# Patient Record
Sex: MALE | Race: WHITE | NOT HISPANIC OR LATINO | Employment: OTHER | URBAN - METROPOLITAN AREA
[De-identification: names, ages, dates, MRNs, and addresses within clinical notes are randomized per-mention and may not be internally consistent; named-entity substitution may affect disease eponyms.]

---

## 2017-01-03 ENCOUNTER — GENERIC CONVERSION - ENCOUNTER (OUTPATIENT)
Dept: OTHER | Facility: OTHER | Age: 72
End: 2017-01-03

## 2017-01-04 ENCOUNTER — GENERIC CONVERSION - ENCOUNTER (OUTPATIENT)
Dept: OTHER | Facility: OTHER | Age: 72
End: 2017-01-04

## 2017-01-09 ENCOUNTER — ALLSCRIPTS OFFICE VISIT (OUTPATIENT)
Dept: OTHER | Facility: OTHER | Age: 72
End: 2017-01-09

## 2017-01-09 DIAGNOSIS — M79.671 PAIN OF RIGHT FOOT: ICD-10-CM

## 2017-01-09 DIAGNOSIS — M79.672 PAIN OF LEFT FOOT: ICD-10-CM

## 2017-02-09 ENCOUNTER — HOSPITAL ENCOUNTER (OUTPATIENT)
Dept: NON INVASIVE DIAGNOSTICS | Facility: HOSPITAL | Age: 72
Discharge: HOME | End: 2017-02-09
Payer: MEDICARE

## 2017-02-09 DIAGNOSIS — I73.9 PERIPHERAL VASCULAR DISEASE (HCC): ICD-10-CM

## 2017-02-09 DIAGNOSIS — M79.672 PAIN OF LEFT FOOT: ICD-10-CM

## 2017-02-09 DIAGNOSIS — M79.671 PAIN OF RIGHT FOOT: ICD-10-CM

## 2017-02-09 PROCEDURE — 93923 UPR/LXTR ART STDY 3+ LVLS: CPT

## 2017-02-09 PROCEDURE — 93925 LOWER EXTREMITY STUDY: CPT

## 2017-02-10 ENCOUNTER — GENERIC CONVERSION - ENCOUNTER (OUTPATIENT)
Dept: OTHER | Facility: OTHER | Age: 72
End: 2017-02-10

## 2017-02-10 DIAGNOSIS — I73.9 PERIPHERAL VASCULAR DISEASE (HCC): ICD-10-CM

## 2017-03-01 ENCOUNTER — ALLSCRIPTS OFFICE VISIT (OUTPATIENT)
Dept: OTHER | Facility: OTHER | Age: 72
End: 2017-03-01

## 2017-03-08 ENCOUNTER — ALLSCRIPTS OFFICE VISIT (OUTPATIENT)
Dept: OTHER | Facility: OTHER | Age: 72
End: 2017-03-08

## 2017-03-09 ENCOUNTER — ALLSCRIPTS OFFICE VISIT (OUTPATIENT)
Dept: WOUND CARE | Facility: HOSPITAL | Age: 72
End: 2017-03-09
Attending: PODIATRIST
Payer: MEDICARE

## 2017-03-09 PROCEDURE — 99213 OFFICE O/P EST LOW 20 MIN: CPT | Performed by: PODIATRIST

## 2017-03-13 ENCOUNTER — GENERIC CONVERSION - ENCOUNTER (OUTPATIENT)
Dept: OTHER | Facility: OTHER | Age: 72
End: 2017-03-13

## 2017-03-23 ENCOUNTER — ALLSCRIPTS OFFICE VISIT (OUTPATIENT)
Dept: WOUND CARE | Facility: HOSPITAL | Age: 72
End: 2017-03-23
Attending: PODIATRIST
Payer: MEDICARE

## 2017-03-23 PROCEDURE — 99212 OFFICE O/P EST SF 10 MIN: CPT | Performed by: PODIATRIST

## 2017-04-11 ENCOUNTER — ALLSCRIPTS OFFICE VISIT (OUTPATIENT)
Dept: OTHER | Facility: OTHER | Age: 72
End: 2017-04-11

## 2017-05-10 ENCOUNTER — GENERIC CONVERSION - ENCOUNTER (OUTPATIENT)
Dept: OTHER | Facility: OTHER | Age: 72
End: 2017-05-10

## 2017-06-02 ENCOUNTER — ALLSCRIPTS OFFICE VISIT (OUTPATIENT)
Dept: OTHER | Facility: OTHER | Age: 72
End: 2017-06-02

## 2017-10-17 ENCOUNTER — GENERIC CONVERSION - ENCOUNTER (OUTPATIENT)
Dept: OTHER | Facility: OTHER | Age: 72
End: 2017-10-17

## 2017-11-08 ENCOUNTER — LAB REQUISITION (OUTPATIENT)
Dept: LAB | Facility: HOSPITAL | Age: 72
End: 2017-11-08
Payer: MEDICARE

## 2017-11-08 ENCOUNTER — ALLSCRIPTS OFFICE VISIT (OUTPATIENT)
Dept: OTHER | Facility: OTHER | Age: 72
End: 2017-11-08

## 2017-11-08 DIAGNOSIS — Z12.5 ENCOUNTER FOR SCREENING FOR MALIGNANT NEOPLASM OF PROSTATE: ICD-10-CM

## 2017-11-08 DIAGNOSIS — R63.4 ABNORMAL WEIGHT LOSS: ICD-10-CM

## 2017-11-08 DIAGNOSIS — E78.5 HYPERLIPIDEMIA: ICD-10-CM

## 2017-11-08 DIAGNOSIS — F41.9 ANXIETY DISORDER: ICD-10-CM

## 2017-11-08 DIAGNOSIS — Z13.1 ENCOUNTER FOR SCREENING FOR DIABETES MELLITUS: ICD-10-CM

## 2017-11-08 DIAGNOSIS — Z11.59 ENCOUNTER FOR SCREENING FOR OTHER VIRAL DISEASES (CODE): ICD-10-CM

## 2017-11-08 LAB
ALBUMIN SERPL BCP-MCNC: 3.7 G/DL (ref 3.5–5)
ALP SERPL-CCNC: 80 U/L (ref 46–116)
ALT SERPL W P-5'-P-CCNC: 16 U/L (ref 12–78)
ANION GAP SERPL CALCULATED.3IONS-SCNC: 8 MMOL/L (ref 4–13)
AST SERPL W P-5'-P-CCNC: 23 U/L (ref 5–45)
BASOPHILS # BLD AUTO: 0.05 THOUSANDS/ΜL (ref 0–0.1)
BASOPHILS NFR BLD AUTO: 1 % (ref 0–1)
BILIRUB SERPL-MCNC: 0.34 MG/DL (ref 0.2–1)
BUN SERPL-MCNC: 34 MG/DL (ref 5–25)
CALCIUM SERPL-MCNC: 8.7 MG/DL (ref 8.3–10.1)
CHLORIDE SERPL-SCNC: 109 MMOL/L (ref 100–108)
CHOLEST SERPL-MCNC: 101 MG/DL (ref 50–200)
CO2 SERPL-SCNC: 24 MMOL/L (ref 21–32)
CREAT SERPL-MCNC: 2.03 MG/DL (ref 0.6–1.3)
EOSINOPHIL # BLD AUTO: 0.32 THOUSAND/ΜL (ref 0–0.61)
EOSINOPHIL NFR BLD AUTO: 4 % (ref 0–6)
ERYTHROCYTE [DISTWIDTH] IN BLOOD BY AUTOMATED COUNT: 14.4 % (ref 11.6–15.1)
EST. AVERAGE GLUCOSE BLD GHB EST-MCNC: 123 MG/DL
GFR SERPL CREATININE-BSD FRML MDRD: 32 ML/MIN/1.73SQ M
GLUCOSE SERPL-MCNC: 95 MG/DL (ref 65–140)
HBA1C MFR BLD: 5.9 % (ref 4.2–6.3)
HCT VFR BLD AUTO: 32.8 % (ref 36.5–49.3)
HDLC SERPL-MCNC: 31 MG/DL (ref 40–60)
HGB BLD-MCNC: 10.4 G/DL (ref 12–17)
LDLC SERPL CALC-MCNC: 44 MG/DL (ref 0–100)
LYMPHOCYTES # BLD AUTO: 2.63 THOUSANDS/ΜL (ref 0.6–4.47)
LYMPHOCYTES NFR BLD AUTO: 30 % (ref 14–44)
MCH RBC QN AUTO: 31 PG (ref 26.8–34.3)
MCHC RBC AUTO-ENTMCNC: 31.7 G/DL (ref 31.4–37.4)
MCV RBC AUTO: 98 FL (ref 82–98)
MONOCYTES # BLD AUTO: 0.72 THOUSAND/ΜL (ref 0.17–1.22)
MONOCYTES NFR BLD AUTO: 8 % (ref 4–12)
NEUTROPHILS # BLD AUTO: 5.05 THOUSANDS/ΜL (ref 1.85–7.62)
NEUTS SEG NFR BLD AUTO: 57 % (ref 43–75)
NRBC BLD AUTO-RTO: 0 /100 WBCS
PLATELET # BLD AUTO: 252 THOUSANDS/UL (ref 149–390)
PMV BLD AUTO: 10.4 FL (ref 8.9–12.7)
POTASSIUM SERPL-SCNC: 5.5 MMOL/L (ref 3.5–5.3)
PROT SERPL-MCNC: 7.2 G/DL (ref 6.4–8.2)
PSA SERPL-MCNC: 0.3 NG/ML (ref 0–4)
RBC # BLD AUTO: 3.36 MILLION/UL (ref 3.88–5.62)
SODIUM SERPL-SCNC: 141 MMOL/L (ref 136–145)
TRIGL SERPL-MCNC: 132 MG/DL
TSH SERPL DL<=0.05 MIU/L-ACNC: 1.58 UIU/ML (ref 0.36–3.74)
WBC # BLD AUTO: 8.79 THOUSAND/UL (ref 4.31–10.16)

## 2017-11-08 PROCEDURE — G0103 PSA SCREENING: HCPCS | Performed by: FAMILY MEDICINE

## 2017-11-08 PROCEDURE — 86803 HEPATITIS C AB TEST: CPT | Performed by: FAMILY MEDICINE

## 2017-11-08 PROCEDURE — 84443 ASSAY THYROID STIM HORMONE: CPT | Performed by: FAMILY MEDICINE

## 2017-11-08 PROCEDURE — 83036 HEMOGLOBIN GLYCOSYLATED A1C: CPT | Performed by: FAMILY MEDICINE

## 2017-11-08 PROCEDURE — 80053 COMPREHEN METABOLIC PANEL: CPT | Performed by: FAMILY MEDICINE

## 2017-11-08 PROCEDURE — 80061 LIPID PANEL: CPT | Performed by: FAMILY MEDICINE

## 2017-11-08 PROCEDURE — 85025 COMPLETE CBC W/AUTO DIFF WBC: CPT | Performed by: FAMILY MEDICINE

## 2017-11-09 LAB — HCV AB SER QL: NORMAL

## 2017-11-10 NOTE — PROGRESS NOTES
Assessment    1  Controlled diabetes mellitus type II without complication (743 53) (N84 8)   2  Need for hepatitis C screening test (V73 89) (Z11 59)   3  Chronic pain (338 29) (G89 29)   4  Current every day smoker (305 1) (F17 200)   · 50+ years / 1 pack per day   5  Chronic kidney disease, stage 3 (585 3) (N18 3)   6  Hypertension (401 9) (I10)    Plan  Abnormal weight loss    · (1) CBC/PLT/DIFF; Status: In Progress - Specimen/Data Collected;   Done: 82VSF4235  Anxiety disorder, Encounter for special screening examination for neoplasm of prostate    · (1) TSH; Status: In Progress - Specimen/Data Collected;   Done: 96TZG6214  Arterial embolism, Chronic obstructive pulmonary disease, DM (diabetes mellitus), type2 with peripheral vascular complications, Hyperlipidemia, Hypertension    · Routine Venipuncture - POC; Status:Complete;   Done: 37ORE3087  Encounter for special screening examination for neoplasm of prostate    · (1) PSA (SCREEN) (Dx V76 44 Screen for Prostate Cancer); Status: In Progress -Specimen/Data Collected;   Done: 80QAW9907  Hyperlipidemia, Screening for diabetes mellitus (DM)    · (1) LIPID PANEL FASTING W DIRECT LDL REFLEX; Status: In Progress -Specimen/Data Collected;   Done: 38VPI8161  Need for hepatitis C screening test    · (1) HEP C ANTIBODY; Status: In Progress - Specimen/Data Collected;   Done:08Nov2017  Need for influenza vaccination    · Fluzone High-Dose 0 5 ML Intramuscular Suspension Prefilled Syringe  Screening for diabetes mellitus (DM)    · (1) COMPREHENSIVE METABOLIC PANEL; Status: In Progress - Specimen/DataCollected;   Done: 64JJP6412   · (1) HEMOGLOBIN A1C; Status: In Progress - Specimen/Data Collected;   Done:08Nov2017  SocHx: Current every day smoker    · You need to quit smoking ; Status:Complete;   Done: 17HMJ5989    Discussion/Summary    1) blood drawn today, including hepatitis C screeninfluenza immunization done todaycontinue furosemide= lasix 40mg now and may increase if kidney function is normaldaily weight in amschedule medicare wellnesshypertension: Borderline, Continue to monitor  Possible side effects of new medications were reviewed with the patient/guardian today  The treatment plan was reviewed with the patient/guardian  The patient/guardian understands and agrees with the treatment plan      Chief Complaint  PATIENT IS HERE TO DISCUSS medications  History of Present Illness  HPI: Patient is here to follow-up on chronic medical conditions  He had a complete metabolic panel done in April that showed a GFR of 37  He was referred to a nephrologist by his cardiologist  He had been taking many advil every day in addition to his pain medication  He has stopped this medication  He noticed some swelling in his legs  His breathing is about the same  He continues to smoke  He states his weight has increased  does not drink any water  Only coffee and tea  He is fasting for blood work today  Review of Systems   Constitutional: no fever or chills, feels well, no tiredness, no recent weight loss or weight gain  ENT: no complaints of earache, no loss of hearing, no nosebleeds or nasal discharge, no sore throat or hoarseness  Cardiovascular: lower extremity edema, but-- as noted in HPI  Respiratory: no complaints of shortness of breath, no wheezing or cough, no dyspnea on exertion, no orthopnea or PND  Gastrointestinal: no complaints of abdominal pain, no constipation, no nausea or vomiting, no diarrhea or bloody stools  Genitourinary: no complaints of dysuria or incontinence, no hesitancy, no nocturia, no genital lesion, no inadequacy of penile erection  Musculoskeletal: Chronic pain, but-- as noted in HPI  Integumentary: no complaints of skin rash or lesion, no itching or dry skin, no skin wounds  Neurological: no complaints of headache, no confusion, no numbness or tingling, no dizziness or fainting  Active Problems    1  Abnormal weight loss (783 21) (R63 4)   2  Acute exacerbation of chronic obstructive bronchitis (491 21) (J44 1)   3  Acute pain of right knee (719 46) (M25 561)   4  Acute right lumbar radiculopathy (724 4) (M54 16)   5  Allergic rhinitis (477 9) (J30 9)   6  Anxiety disorder (300 00) (F41 9)   7  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444 9) (I74 9)   8  Benign hypertrophy of prostate (600 00) (N40 0)   9  CAD (coronary atherosclerotic disease) (414 00) (I25 10)   10  Cardiac pacemaker (V45 01) (Z95 0)   11  Chondromalacia of patella, right (717 7) (M22 41)   12  Chronic bronchitis (491 9) (J42)   13  Chronic kidney disease, stage 3 (585 3) (N18 3)   14  Chronic obstructive pulmonary disease (496) (J44 9)   15  Chronic pain (338 29) (G89 29)   16  Constipation (564 00) (K59 00)   17  Controlled diabetes mellitus type II without complication (700 02) (G08 6)   18  Degenerative cervical disc (722 4) (M50 30)   19  DM (diabetes mellitus), type 2 with peripheral vascular complications (431 26,514 89)  (E11 51)   20  Dysphagia (787 20) (R13 10)   21  Edema eyelid (374 82) (H02 849)   22  Encounter for special screening examination for neoplasm of prostate (V76 44) (Z12 5)   23  Esophageal reflux (530 81) (K21 9)   24  Fatigue (780 79) (R53 83)   25  High risk medication use (V58 69) (Z79 899)   26  Hoarseness (784 42) (R49 0)   27  Hyperlipidemia (272 4) (E78 5)   28  Hypertension (401 9) (I10)   29  Ischemic cardiomyopathy (414 8) (I25 5)   30  Kidney cysts (753 10) (N28 1)   31  Left foot pain (729 5) (M79 672)   32  Lower abdominal pain (789 09) (R10 30)   33  Lower back pain (724 2) (M54 5)   34  Lumbar radiculopathy (724 4) (M54 16)   35  Lumbosacral spondylosis (721 3) (M47 817)   36  Muscle spasms of neck (728 85) (M62 838)   37  Neck pain (723 1) (M54 2)   38  Need for hepatitis C screening test (V73 89) (Z11 59)   39  Need for influenza vaccination (V04 81) (Z23)   40  Need for shingles vaccine (V04 89) (Z23)   41   Neural foraminal stenosis of cervical spine (723 0) (M99 81)   42  Neuralgia (729 2) (M79 2)   43  Nicotine dependence (305 1) (F17 200)   44  Onychomycosis of toenail (110 1) (B35 1)   45  Other chronic pain (338 29) (G89 29)   46  Pain of fifth toe (729 5) (M79 676)   47  Peripheral vascular disease (443 9) (I73 9)   48  Primary osteoarthritis of right knee (715 16) (M17 11)   49  Right foot pain (729 5) (M79 671)   50  Right hemisphere, cerebral infarction (434 91) (I63 9)   51  Right hip pain (719 45) (M25 551)   52  Right knee pain (719 46) (M25 561)   53  Screening for diabetes mellitus (DM) (V77 1) (Z13 1)   54  Shortness of breath (786 05) (R06 02)   55  Solitary lung nodule (793 11) (R91 1)   56  Type 2 diabetes mellitus with hyperglycemia (250 00) (E11 65)   57  Ulceration (707 9) (L98 499)   58  Vallecular mass (784 2) (J38 7)   59  Vitamin D deficiency (268 9) (E55 9)    Past Medical History  1  History of Acute Myocardial Infarction (V12 59)   2  History of Anxiety (300 00) (F41 9)   3  History of Cellulitis of fifth toe, left (681 10) (L03 032)   4  History of Central obesity (278 1) (E65)   5  History of acute sinusitis (V12 69) (Z87 09)   6  History of urinary frequency (V13 09) (Z87 898)   7  History of urinary tract infection (V13 02) (Z87 440)   8  History of Ulcer of toe of left foot (707 15) (F60 881)  Active Problems And Past Medical History Reviewed: The active problems and past medical history were reviewed and updated today  Family History  Mother    1  Family history of Mother  At Age 70  Father    2  Family history of Father  At Age 79  Brother    1  Family history of Coronary Artery Disease (V17 49)  Family History    4  Family history of Arthritis (V17 7)   5  Family history of Heart Disease (V17 49)   6  Family history of Hypertension (V17 49)  Family History Reviewed: The family history was reviewed and updated today         Social History   · Being A Social Drinker   · Current every day smoker (305 1) (F17 200)   · 50+ years / 1 pack per day   · Daily caffeinated coffee consumption   · 2 cups per day   · Denied: History of Drug Use   · Exercise: Walking   · 7 days per week   ·    · No living will   · Retired from employment   ·    · Well balanced diet (V49 89) (Z78 9)  The social history was reviewed and updated today  The social history was reviewed and is unchanged  Surgical History    1  History of Appendectomy   2  History of Cath Stent Placement   3  History of Implantable Cardioverter-Defibrillator   4  History of Inguinal Hernia Repair  Surgical History Reviewed: The surgical history was reviewed and updated today  Current Meds   1  AmLODIPine Besylate 5 MG Oral Tablet; take 1 tablet every day; Therapy: 94SJV2645 to (Evaluate:25Apr2017)  Requested for: 28Oct2016; Last Rx:28Oct2016 Ordered   2  Atenolol 50 MG Oral Tablet; take 1/2 tablet daily; Therapy: 20RBL5019 to (Carl Fuller)  Requested for: 29Yde1119; Last Rx:52Slt6106 Ordered   3  Atorvastatin Calcium 80 MG Oral Tablet; TAKE ONE TABLET BY MOUTH ONE TIME DAILY; Therapy: 43HSB6822 to (Evaluate:12Nov2017)  Requested for: 77TLO1663; Last Rx:31Wcl1473 Ordered   4  Carisoprodol 350 MG Oral Tablet; Take 1 tablet 4 times daily; Therapy: 49Aip8383 to (Evaluate:15Oct2017); Last Rx:76Cna7324 Ordered   5  Cefuroxime Axetil 250 MG Oral Tablet; TAKE 1 TABLET EVERY 12 HOURS DAILY; Therapy: 35Mrg5062 to (Evaluate:12Jun2017)  Requested for: 36PZO6850; Last Rx:02Jun2017 Ordered   6  Clopidogrel Bisulfate 75 MG Oral Tablet; TAKE 1 TABLET DAILY; Therapy: 42GSO1559 to (Evaluate:12Nov2017)  Requested for: 98NEP5482; Last Rx:47Aoq6710 Ordered   7  Enalapril Maleate 20 MG Oral Tablet; Take 1 tablet daily; Therapy: (EBJTKVBB:23TPV3756) to Recorded   8  Famotidine 20 MG Oral Tablet; take 1 tablet twice daily as directed; Therapy: 50JVR6562 to (Evaluate:27Apr2017)  Requested for: 29Oct2016; Last Rx:29Oct2016 Ordered   9  Fluticasone Propionate 50 MCG/ACT Nasal Suspension; USE 2 SPRAYS IN EACH NOSTRIL ONCE DAILY; Therapy: 49NMV9998 to (Evaluate:19Oct2015)  Requested for: ; Last Rx:24Oct2014 Ordered   10  FreeStyle Lancets Miscellaneous; test twice daily; Therapy: 12PIQ6817 to (Evaluate:76Jdw4311); Last Rx:11Mar2013 Ordered   11  FreeStyle Lite Test In Vitro Strip; USED FOR TWICE DAILY TESTING 250 02; Therapy: 68AWH3148 to (Evaluate:02Kbe0291); Last Rx:11Mar2013 Ordered   12  Furosemide 40 MG Oral Tablet; Take 1 1/2 tablet daily; Therapy: 27Qgh9953 to (Evaluate:07Apr2018)  Requested for: 47ZWD8051; Last  Rx:09Oct2017 Ordered   13  Isosorbide Mononitrate ER 30 MG Oral Tablet Extended Release 24 Hour; TAKE 1  TABLET DAILY AS DIRECTED; Therapy: 20Pdw7276 to (Evaluate:29Apr2018)  Requested for: 14KIH0447; Last  MP:74BHN8288 Ordered   14  Lidocaine 5 % External Patch; APPLY 1 PATCH TO THE AFFECTED AREA AND LEAVE  IN PLACE FOR 12 HOURS, THEN REMOVE AND LEAVE OFF FOR 12 HOURS; Therapy: 04DRT8042 to (Evaluate:19Mar2017); Last Rx:09Mar2017 Ordered   15  Lidocaine HCl - 2 % External Gel; APPLY 1g topically as needed; Therapy: 67Wtm4658 to (Opal Starkey)  Requested for: 06XUX2849; Last  Rx:18Oct2017 Ordered   16  Lidocaine HCl - 2 % External Gel; Apply to wound base prior to debridement prn pain  control; Therapy: (Recorded:17Mar2017) to Recorded   17  Lidocaine HCl - 4 % External Solution; Apply to wound base prior to debridement prn  pain control; Therapy: (Recorded:17Mar2017) to Recorded   18  Lidotrex 2 % External Gel; APPLY TWICE A DAY AS NEEDED  Requested for:  09CJP0021; Last BU:55BID2587 Ordered   19  LORazepam 0 5 MG Oral Tablet; TAKE 1 TABLET EVERY 6 TO 8 HOURS AS NEEDED; Therapy: 00DTI1511 to (Evaluate:74Vfs5679); Last Rx:94Hab2376 Ordered   20  Minitran 0 4 MG/HR Transdermal Patch 24 Hour; APPLY PATCH FOR 12 TO 14 HOURS  DAILY, THEN REMOVE;  Therapy: 54YPL2484 to (Evaluate:23Mar2017);  Last PE:68RVU2570 Ordered   21  Nicotine 21 MG/24HR Transdermal Patch 24 Hour; APPLY 1 PATCH DAILY AS  DIRECTED; Therapy: 39TIZ2572 to (Evaluate:06Jun2016); Last NZ:14BRB5157 Ordered   22  Nitro-Dur 0 4 MG/HR Transdermal Patch 24 Hour; APPLY PATCH FOR 12 TO 14 HOURS  DAILY, THEN REMOVE;  Therapy: 81BDH3613 to (Evaluate:22Wsi8248)  Requested for: 82SSF0325; Last  Rx:61Ter6892 Ordered   23  Nitroglycerin 0 4 MG/HR Transdermal Patch 24 Hour; APPLY PATCH FOR 12 TO 14  HOURS DAILY, THEN REMOVE;  Therapy: 53LZT2456 to (Evaluate:12Hbz9898)  Requested for: 87PAR1034; Last  Rx:18Oct2017 Ordered   24  OxyCODONE HCl - 30 MG Oral Tablet; TAKE 1 TABLET every 4 hours as needed for  pain; Therapy: 68Epi6502 to (Evaluate:26Nov2017); Last Rx:27Oct2017 Ordered   25  Polyethylene Glycol 3350 Oral Powder; Take 1 heaping tablespoonful of powder mixed  in 8 ounces of water daily; Therapy: 14VOI8234 to (Last Rx:02Jun2017)  Requested for: 02Jun2017 Ordered   26  Symbicort 160-4 5 MCG/ACT Inhalation Aerosol; INHALE 2 PUFFS TWICE DAILY  RINSE MOUTH AFTER USE; Therapy: 36UKA9774 to (Last Rx:26Rop7513)  Requested for: 67VCC9904 Ordered   27  Tamsulosin HCl - 0 4 MG Oral Capsule; take 1 capsule at bedtime; Therapy: 60FBF6876 to (Last Rx:23Jan2017)  Requested for: 32KNA3229 Ordered   28  Ventolin  (90 Base) MCG/ACT Inhalation Aerosol Solution; INHALE 2 PUFFS BY  MOUTH 4 TIMES A DAY AS NEEDED; Therapy: 91DRX5330 to (Evaluate:13Jun2017)  Requested for: 42LBB0681; Last  Rx:14Jan2017 Ordered    The medication list was reviewed and updated today  Allergies  1   Gabapentin CAPS    Vitals   Recorded: 56IQJ3006 12:04PM   Temperature 97 8 F   Heart Rate 54   Systolic 700   Diastolic 78   Height 5 ft 8 in   Weight 196 lb 8 oz   BMI Calculated 29 88   BSA Calculated 2 03   O2 Saturation 96     Results/Data  (1) COMPREHENSIVE METABOLIC PANEL 40LEM4374 74:18SY Carline Rash     Test Name Result Flag Reference   Glucose, Serum 116 mg/dL H 65-99   BUN 33 mg/dL H 8-27   Creatinine, Serum 1 83 mg/dL H 0 76-1 27   BUN/Creatinine Ratio 18  10-22   Sodium, Serum 141 mmol/L  134-144   Potassium, Serum 5 0 mmol/L  3 5-5 2   Chloride, Serum 105 mmol/L     Carbon Dioxide, Total 19 mmol/L  18-29   Calcium, Serum 8 9 mg/dL  8 6-10 2   Protein, Total, Serum 6 9 g/dL  6 0-8 5   Albumin, Serum 4 1 g/dL  3 5-4 8   Globulin, Total 2 8 g/dL  1 5-4 5   A/G Ratio 1 5  1 1-2 5   Bilirubin, Total 0 3 mg/dL  0 0-1 2   Alkaline Phosphatase, S 76 IU/L     AST (SGOT) 26 IU/L  0-40   ALT (SGPT) 11 IU/L  0-44   eGFR If NonAfricn Am 37 mL/min/1 73 L >59   eGFR If Africn Am 42 mL/min/1 73 L >59       Signatures   Electronically signed by : Garry Shahid DO; Nov 8 2017 11:02PM EST                       (Author)

## 2017-11-14 ENCOUNTER — GENERIC CONVERSION - ENCOUNTER (OUTPATIENT)
Dept: OTHER | Facility: OTHER | Age: 72
End: 2017-11-14

## 2017-11-16 ENCOUNTER — LAB REQUISITION (OUTPATIENT)
Dept: LAB | Facility: HOSPITAL | Age: 72
End: 2017-11-16
Payer: MEDICARE

## 2017-11-16 DIAGNOSIS — Z79.899 OTHER LONG TERM (CURRENT) DRUG THERAPY: ICD-10-CM

## 2017-11-16 LAB
IRON SERPL-MCNC: 37 UG/DL (ref 65–175)
VIT B12 SERPL-MCNC: 1692 PG/ML (ref 100–900)

## 2017-11-16 PROCEDURE — 83540 ASSAY OF IRON: CPT | Performed by: FAMILY MEDICINE

## 2017-11-16 PROCEDURE — 82607 VITAMIN B-12: CPT | Performed by: FAMILY MEDICINE

## 2017-11-17 ENCOUNTER — GENERIC CONVERSION - ENCOUNTER (OUTPATIENT)
Dept: OTHER | Facility: OTHER | Age: 72
End: 2017-11-17

## 2018-01-10 NOTE — RESULT NOTES
Verified Results  (1) VITAMIN B12 10IPN5857 08:22PM [facility]     Test Name Result Flag Reference   VITAMIN B12 1692 pg/mL H 100-900     (1) IRON [id number] 08:22PM [facility]     Test Name Result Flag Reference   IRON 37 ug/dL L    Patients treated with metal-binding drugs (ie  Deferoxamine) may have depressed iron values

## 2018-01-11 NOTE — PROGRESS NOTES
Assessment    1  Cellulitis of fifth toe, left (681 10) (L03 032)   2  Current every day smoker (305 1) (F17 200)   3  DM (diabetes mellitus), type 2 with peripheral vascular complications (120 26,058 90)   (E11 51)    Plan  Cellulitis of fifth toe, left    · Sulfamethoxazole-Trimethoprim 800-160 MG Oral Tablet; Take 1 tablet twice daily  Central obesity    · Some eating tips that can help you lose weight ; Status:Complete;   Done: 12WWX3843   · There are many exercise options for seniors ; Status:Complete;   Done: 96BXE6846  SocHx: Current every day smoker    · You need to quit smoking ; Status:Complete;   Done: 49DTO0438  Ulcer of toe of left foot    · Amoxicillin-Pot Clavulanate 875-125 MG Oral Tablet    Discussion/Summary    1) stop enalpril 20mg   2) monitor bp   3) bactrim 1 tab twice a day  4) wound care cnter for evaluation , likely vascular componenet  Possible side effects of new medications were reviewed with the patient/guardian today  The treatment plan was reviewed with the patient/guardian  The patient/guardian understands and agrees with the treatment plan      Chief Complaint  PT IS HERE FOR FOLLOW UP ON ULCER ON TOE OF LEFT FOOT  PT STATES IT DOES NOT FEEL ANY BETTER  PT STOPPED ANTIBIOTICS YESTERDAY DUE TO SEVERE DIARRHEA  History of Present Illness  pt is here with his wife today  he has been taking antibiotics and his toe doesn't seem much better  he stated the hydromorphone did not help with the pain, the oxycodone helped better  it is still very painful and red  no fever  pt had to stop antibiotics due to diarrhea  Review of Systems    Constitutional: No fever or chills, feels well, no tiredness, no recent weight gain or weight loss  Eyes: No complaints of eye pain, no red eyes, no discharge from eyes, no itchy eyes  ENT: no complaints of earache, no hearing loss, no nosebleeds, no nasal discharge, no sore throat, no hoarseness     Cardiovascular: No complaints of slow heart rate, no fast heart rate, no chest pain, no palpitations, no leg claudication, no lower extremity  Respiratory: No complaints of shortness of breath, no wheezing, no cough, no SOB on exertion, no orthopnea or PND  Gastrointestinal: No complaints of abdominal pain, no constipation, no nausea or vomiting, no diarrhea or bloody stools  Genitourinary: No complaints of dysuria, no incontinence, no hesitancy, no nocturia, no genital lesion, no testicular pain  Musculoskeletal: No complaints of arthralgia, no myalgias, no joint swelling or stiffness, no limb pain or swelling  Integumentary: a rash, but as noted in HPI  Neurological: No compliants of headache, no confusion, no convulsions, no numbness or tingling, no dizziness or fainting, no limb weakness, no difficulty walking  Psychiatric: Is not suicidal, no sleep disturbances, no anxiety or depression, no change in personality, no emotional problems  Hematologic/Lymphatic: No complaints of swollen glands, no swollen glands in the neck, does not bleed easily, no easy bruising  Active Problems    1  Abnormal weight loss (783 21) (R63 4)   2  Acute pain of right knee (719 46) (M25 561)   3  Acute right lumbar radiculopathy (724 4) (M54 16)   4  Allergic rhinitis (477 9) (J30 9)   5  Anxiety disorder (300 00) (F41 9)   6  Benign hypertrophy of prostate (600 00) (N40 0)   7  CAD (coronary atherosclerotic disease) (414 00) (I25 10)   8  Cardiac pacemaker (V45 01) (Z95 0)   9  Central obesity (278 1) (E65)   10  Chondromalacia of patella, right (717 7) (M22 41)   11  Chronic kidney disease, stage 3 (585 3) (N18 3)   12  Chronic obstructive pulmonary disease (496) (J44 9)   13  Chronic pain (338 29) (G89 29)   14  Constipation (564 00) (K59 00)   15  Controlled diabetes mellitus type II without complication (924 38) (Q00 0)   16   Degenerative cervical disc (722 4) (M50 30)   17  DM (diabetes mellitus), type 2 with peripheral vascular complications (250 70,443 81)    (E11 51)   18  Dysphagia (787 20) (R13 10)   19  Encounter for screening fecal occult blood testing (V76 51) (Z12 11)   20  Encounter for screening for malignant neoplasm of colon (V76 51) (Z12 11)   21  Esophageal reflux (530 81) (K21 9)   22  Fatigue (780 79) (R53 83)   23  High risk medication use (V58 69) (Z79 899)   24  Hoarseness (784 42) (R49 0)   25  Hyperlipidemia (272 4) (E78 5)   26  Hypertension (401 9) (I10)   27  Ischemic cardiomyopathy (414 8) (I25 5)   28  Kidney cysts (753 10) (N28 1)   29  Left foot pain (729 5) (M79 672)   30  Lower abdominal pain (789 09) (R10 30)   31  Lower back pain (724 2) (M54 5)   32  Lumbar radiculopathy (724 4) (M54 16)   33  Lumbosacral spondylosis (721 3) (M47 817)   34  Muscle spasms of neck (728 85) (M62 838)   35  Neck pain (723 1) (M54 2)   36  Need for influenza vaccination (V04 81) (Z23)   37  Need for shingles vaccine (V04 89) (Z23)   38  Neural foraminal stenosis of cervical spine (723 0) (M99 81)   39  Neuralgia (729 2) (M79 2)   40  Nicotine dependence (305 1) (F17 200)   41  Onychomycosis of toenail (110 1) (B35 1)   42  Other chronic pain (338 29) (G89 29)   43  Peripheral vascular disease (443 9) (I73 9)   44  Primary osteoarthritis of right knee (715 16) (M17 11)   45  Right foot pain (729 5) (M79 671)   46  Right hemisphere, cerebral infarction (434 91) (I63 9)   47  Right hip pain (719 45) (M25 551)   48  Right knee pain (719 46) (M25 561)   49  Shortness of breath (786 05) (R06 02)   50  Solitary lung nodule (793 11) (R91 1)   51  Type 2 diabetes mellitus with hyperglycemia (250 00) (E11 65)   52  Ulcer of toe of left foot (707 15) (L97 529)   53  Vallecular mass (784 2) (J38 7)   54  Vitamin D deficiency (268 9) (E55 9)    Past Medical History    1  History of Acute Myocardial Infarction (V12 59)   2  History of Anxiety (300 00) (F41 9)   3  History of acute sinusitis (V12 69) (Z87 09)   4   History of urinary frequency (V13 09) (G69 240)   5  History of urinary tract infection (V13 02) (Z87 490)    The active problems and past medical history were reviewed and updated today  Surgical History    1  History of Appendectomy   2  History of Cath Stent Placement   3  History of Implantable Cardioverter-Defibrillator   4  History of Inguinal Hernia Repair    The surgical history was reviewed and updated today  Family History  Mother    1  Family history of Mother  At Age 70  Father    2  Family history of Father  At Age 79  Brother    1  Family history of Coronary Artery Disease (V17 49)  Family History    4  Family history of Arthritis (V17 7)   5  Family history of Heart Disease (V17 49)   6  Family history of Hypertension (V17 49)    The family history was reviewed and updated today  Social History    · Being A Social Drinker   · Current every day smoker (305 1) (F17 200)   · Denied: History of Drug Use  The social history was reviewed and updated today  The social history was reviewed and is unchanged  Current Meds   1  AmLODIPine Besylate 5 MG Oral Tablet; take 1 tablet every day; Therapy: 85DJB2537 to (Evaluate:2017)  Requested for: 2016; Last   Rx:2016 Ordered   2  Amoxicillin-Pot Clavulanate 875-125 MG Oral Tablet; TAKE 1 TABLET EVERY 12 HOURS   DAILY; Therapy: 42WRR4368 to (Evaluate:2017)  Requested for: 56FUC1626; Last   Rx:2017 Ordered   3  Atenolol 50 MG Oral Tablet; take 1/2 tablet daily; Therapy: 71EHL7092 to (Evaluate:44Xin9179)  Requested for: 57Hdp8707; Last   Rx:44Hxd7839 Ordered   4  Atorvastatin Calcium 80 MG Oral Tablet; TAKE ONE TABLET BY MOUTH ONE TIME DAILY; Therapy: 11UZE1180 to (Kunal Metcalf)  Requested for: 20UFZ4122; Last   Rx:2016 Ordered   5  Carisoprodol 350 MG Oral Tablet; Take 1 tablet 4 times daily; Therapy: 12Weu1561 to (Evaluate:2017); Last Rx:51Fut0404 Ordered   6   Clopidogrel Bisulfate 75 MG Oral Tablet; TAKE 1 TABLET DAILY; Therapy: 71AVG8806 to (Kathy Choi)  Requested for: 54YUV0402; Last   Rx:08Nov2016 Ordered   7  Enalapril Maleate 20 MG Oral Tablet; Take 1 tablet daily; Therapy: (SQGFSYGZ:25EJF8071) to Recorded   8  Famotidine 20 MG Oral Tablet; take 1 tablet twice daily as directed; Therapy: 72NHD3704 to (Evaluate:27Apr2017)  Requested for: 29Oct2016; Last   Rx:29Oct2016 Ordered   9  Fluticasone Propionate 50 MCG/ACT Nasal Suspension; USE 2 SPRAYS IN EACH   NOSTRIL ONCE DAILY; Therapy: 11DMK6793 to (Evaluate:19Oct2015)  Requested for: ; Last   Rx:24Oct2014 Ordered   10  FreeStyle Lancets Miscellaneous; test twice daily; Therapy: 70IUB5208 to (Evaluate:36Dyb0644); Last Rx:11Mar2013 Ordered   11  FreeStyle Lite Test In Vitro Strip; USED FOR TWICE DAILY TESTING 250 02; Therapy: 02GSL3543 to (Evaluate:80Gau0742); Last Rx:11Mar2013 Ordered   12  Furosemide 40 MG Oral Tablet; Take 1 1/2 tablet daily; Therapy: 90Zwl3707 to (Leila Mello)  Requested for: 70ETK6986; Last    Rx:64Jxm0700 Ordered   13  HYDROmorphone HCl - 2 MG Oral Tablet; TAKE 1-2 TABLET EVERY 4 HOURS AS    NEEDED FOR PAIN;    Therapy: 44BHY7751 to (Evaluate:16Mar2017); Last Rx:01Mar2017 Ordered   14  Isosorbide Mononitrate ER 30 MG Oral Tablet Extended Release 24 Hour; TAKE 1    TABLET ONCE DAILY; Therapy: 33Bgl3171 to (Evaluate:29Apr2017)  Requested for: 31Oct2016; Last    Rx:31Oct2016 Ordered   15  Lidocaine 2 5 % Gel; Therapy: (Recorded:08Mar2017) to Recorded   16  LORazepam 0 5 MG Oral Tablet; TAKE 1 TABLET EVERY 6 TO 8 HOURS AS NEEDED; Therapy: 72NVY8443 to (Evaluate:68Pgz4950); Last Rx:03Jan2017 Ordered   17  Nicotine 21 MG/24HR Transdermal Patch 24 Hour; APPLY 1 PATCH DAILY AS    DIRECTED; Therapy: 18CCJ5223 to (Evaluate:06Jun2016); Last RS:99YAW2250 Ordered   18  OxyCODONE HCl - 30 MG Oral Tablet; TAKE 1 TABLET every 4 hours as needed for pain; Therapy: 17Qsj3567 to (Evaluate:01Apr2017);  Last Rx:02Mar2017 Ordered   19  Proventil  (90 Base) MCG/ACT Inhalation Aerosol Solution; TAKE 2 PUFFS 4    TIMES A DAY AS NEEDED; Therapy: 49KWX0219 to (Evaluate:12Jun2017)  Requested for: 83SZV4643; Last    Rx:13Jan2017 Ordered   20  Symbicort 160-4 5 MCG/ACT Inhalation Aerosol; INHALE 2 PUFFS TWICE DAILY  RINSE    MOUTH AFTER USE; Therapy: 51ISG5578 to (Last Rx:71Bhs8645)  Requested for: 66NLW6645 Ordered   21  Tamsulosin HCl - 0 4 MG Oral Capsule; take 1 capsule at bedtime; Therapy: 09IHY6793 to (Last Rx:23Jan2017)  Requested for: 23Jan2017 Ordered   22  Ventolin  (90 Base) MCG/ACT Inhalation Aerosol Solution; INHALE 2 PUFFS BY    MOUTH 4 TIMES A DAY AS NEEDED; Therapy: 72AKD0726 to (Evaluate:13Jun2017)  Requested for: 74UPY3993; Last    Rx:14Jan2017 Ordered    The medication list was reviewed and updated today  Allergies    1  Gabapentin CAPS    Vitals  Vital Signs    Recorded: 66OBG3961 10:39AM   Temperature 96 7 F   Heart Rate 62   Systolic 377   Diastolic 52   Height 5 ft 9 5 in   Weight 194 lb    BMI Calculated 28 24   BSA Calculated 2 05   O2 Saturation 97     Physical Exam    Constitutional   General appearance: Abnormal   central obesity  Pulmonary   Respiratory effort: No increased work of breathing or signs of respiratory distress  Auscultation of lungs: Clear to auscultation, equal breath sounds bilaterally, no wheezes, no rales, no rhonci  Cardiovascular   Auscultation of heart: Normal rate and rhythm, normal S1 and S2, without murmurs  Musculoskeletal   Gait and station: Abnormal     Digits and nails: Abnormal   left 5th digit of foot, redness, some drainage from under nail  ulcer medial toe slight improvement  Psychiatric   Orientation to person, place and time: Normal     Mood and affect: Normal          Health Management  Controlled diabetes mellitus type II without complication   *VB - Foot Exam; every 1 year;  Last 48UGA6882; Next Due: 88ZHY0791; Active    Future Appointments    Date/Time Provider Specialty Site   03/09/2017 03:30 PM MALKA Chen     Signatures   Electronically signed by : Cale Redd DO; Mar  8 2017  9:09PM EST                       (Author)

## 2018-01-13 VITALS
HEIGHT: 70 IN | DIASTOLIC BLOOD PRESSURE: 58 MMHG | BODY MASS INDEX: 28.06 KG/M2 | WEIGHT: 196 LBS | HEART RATE: 60 BPM | TEMPERATURE: 97.7 F | OXYGEN SATURATION: 98 % | SYSTOLIC BLOOD PRESSURE: 122 MMHG

## 2018-01-13 VITALS
SYSTOLIC BLOOD PRESSURE: 136 MMHG | DIASTOLIC BLOOD PRESSURE: 80 MMHG | OXYGEN SATURATION: 98 % | HEART RATE: 84 BPM | HEIGHT: 68 IN | WEIGHT: 183 LBS | TEMPERATURE: 98.2 F | BODY MASS INDEX: 27.74 KG/M2

## 2018-01-13 VITALS
HEART RATE: 62 BPM | BODY MASS INDEX: 27.77 KG/M2 | OXYGEN SATURATION: 97 % | WEIGHT: 194 LBS | TEMPERATURE: 96.7 F | DIASTOLIC BLOOD PRESSURE: 52 MMHG | SYSTOLIC BLOOD PRESSURE: 110 MMHG | HEIGHT: 70 IN

## 2018-01-13 NOTE — RESULT NOTES
Verified Results  VAS LOWER LIMB ARTERIAL DUPLEX, COMPLETE BILATERAL/GRAFTS [id number] 01:20PM Ammy Walker    Order Number: [id number]    - Patient Instructions: To schedule this appointment, please contact Central Scheduling at [phone]  Test Name Result Flag Reference   VAS LOWER LIMB ARTERIAL DUPLEX, COMPLETE BILATERAL/GRAFTS (Report)     [facility] REPORT   CLINICAL:   Indications: PVD, Unspecified [I73 9]  Pt  complains of having very severe   pain in his fourth and fifth toes on his bilateral feet  He also has off and on   pain in his legs with walking  He states both symptoms began after he had a   stroke four months ago  Risk Factors: The patient has history of Hyperlipidemia and Hypertension  Right Brachial Pressure: 140/ mmHg     Left Brachial Pressure: 138/   mmHg  FINDINGS:      Segment        Right           Left                        Impression    PSV EDV Impression PSV EDV    Common Femoral Artery 50-75%      92  0        98  0    Prox Profunda     <50%       165  0 <50%    161  0    Prox SFA        50-75%      235  0 <50%    177  0    Mid SFA                 132  0       135  0    Dist SFA        Diffuse Disease 143  0       149  0    Proximal Pop               91  0        94  0    Distal Pop                69  2        70  0    Prox Post Tibial             61  0        43  0    Dist Post Tibial             61  0        51  0    Prox  Ant  Tibial   <50%       194  0        68  0    Dist  Ant  Tibial            64  0        63  0    Prox Peroneal                         92  0    Dist Peroneal              34  0        54  0             CONCLUSION:   Impression:   RIGHT LOWER LIMB:   This resting evaluation shows evidence of a 50-75% stenosis within the common   femoral artery and the proximal superficial femoral artery  There is a <50%   stenosis within the profunda femoris artery and the proximal/mid anterior tibial   artery   Diffuse disease is seen within the distal superficial femoral artery  Ankle/Brachial index: 1 16(normal range)   PVR tracings are dampened with absence of dicrotic notch  Toes needed to be   warmed in order to get any type of waveform  Metatarsal pressure of 100mmHg   Great toe pressure of 101mmHg, within the healing range      LEFT LOWER LIMB:   This resting evaluation shows evidence of a <50% stenosis within the profunda   femoris artery and the proximal superficial femoral artery  Ankle/Brachial index: 1 09(normal range)   PVR tracings are dampened with absence of dicrotic notch  Toes needed to be   warmed in order to get any type of waveform  Metatarsal pressure of 100mmHg   Great toe pressure of 100mmHg, within the healing range      Recommend repeat testing in 1year as per protocol unless otherwise indicated        SIGNATURE:   Electronically Signed by: Irina Fall MD, RPVI on 2017-02-10 11:22:33 AM       Plan  Peripheral vascular disease    · VAS LOWER LIMB ARTERIAL DUPLEX, COMPLETE BILATERAL/GRAFTS;  SIDE:Bilateral; Status:Hold For - Scheduling; Requested for:16Auc9132;

## 2018-01-13 NOTE — RESULT NOTES
Verified Results  (1) COMPREHENSIVE METABOLIC PANEL 14UJA3126 37:80RX Verdis Graven     Test Name Result Flag Reference   Glucose, Serum 116 mg/dL H 65-99   BUN 33 mg/dL H 8-27   Creatinine, Serum 1 83 mg/dL H 0 76-1 27   eGFR If NonAfricn Am 37 mL/min/1 73 L >59   eGFR If Africn Am 42 mL/min/1 73 L >59   BUN/Creatinine Ratio 18  10-22   Sodium, Serum 141 mmol/L  134-144   Potassium, Serum 5 0 mmol/L  3 5-5 2   Chloride, Serum 105 mmol/L     Carbon Dioxide, Total 19 mmol/L  18-29   Calcium, Serum 8 9 mg/dL  8 6-10 2   Protein, Total, Serum 6 9 g/dL  6 0-8 5   Albumin, Serum 4 1 g/dL  3 5-4 8   Globulin, Total 2 8 g/dL  1 5-4 5   A/G Ratio 1 5  1 1-2 5   Bilirubin, Total 0 3 mg/dL  0 0-1 2   Alkaline Phosphatase, S 76 IU/L     AST (SGOT) 26 IU/L  0-40   ALT (SGPT) 11 IU/L  0-44     (1) PSA (SCREEN) (Dx V76 44 Screen for Prostate Cancer) [id number] 12:00AM Verdis Graven     Test Name Result Flag Reference   Prostate Specific Ag, Serum 0 4 ng/mL  0 0-4 0   Roche ECLIA methodology  According to the American Urological Association, Serum PSA should  decrease and remain at undetectable levels after radical  prostatectomy  The AUA defines biochemical recurrence as an initial  PSA value 0 2 ng/mL or greater followed by a subsequent confirmatory  PSA value 0 2 ng/mL or greater  Values obtained with different assay methods or kits cannot be used  interchangeably  Results cannot be interpreted as absolute evidence  of the presence or absence of malignant disease  (1) TSH 39Fjc8700 12:00AM Verdis Graven     Test Name Result Flag Reference   TSH 2 290 uIU/mL  0 450-4 500     (1) VITAMIN D 25-HYDROXY 07Tce9074 12:00AM Verdis Graven     Test Name Result Flag Reference   Vitamin D, 25-Hydroxy 9 9 ng/mL L 30 0-100 0   Vitamin D deficiency has been defined by the [address] practice guideline as a  level of serum 25-OH vitamin D less than 20 ng/mL (1,2)    The Endocrine Society went on to further define vitamin D  insufficiency as a level between 21 and 29 ng/mL (2)  1  IOM ([employer])  2010  Dietary reference     intakes for calcium and D  [facility]: The     [employer]  2  Charu MF, Brandi JOHNSON, Jagdeep DANG, et al      Evaluation, treatment, and prevention of vitamin D     deficiency: an Endocrine Society clinical practice     guideline  JCEM  2011 Jul; 96(7):1911-30  (LC) CBC/Differential (No Platelet) [id number] 03:69RF Maria Del Rosario Large     Test Name Result Flag Reference   WBC 8 6 x10E3/uL  3 4-10 8   RBC 4 20 x10E6/uL  4 14-5 80   Hemoglobin 12 5 g/dL L 12 6-17 7   Hematocrit 38 0 %  37 5-51 0   MCV 91 fL  79-97   MCH 29 8 pg  26 6-33 0   MCHC 32 9 g/dL  31 5-35 7   RDW 14 4 %  12 3-15 4   Neutrophils 61 %     Lymphs 25 %     Monocytes 9 %     Eos 4 %     Basos 1 %     Neutrophils (Absolute) 5 3 x10E3/uL  1 4-7 0   Lymphs (Absolute) 2 1 x10E3/uL  0 7-3 1   Monocytes(Absolute) 0 8 x10E3/uL  0 1-0 9   Eos (Absolute) 0 4 x10E3/uL  0 0-0 4   Baso (Absolute) 0 0 x10E3/uL  0 0-0 2   Immature Granulocytes 0 %     Immature Grans (Abs) 0 0 x10E3/uL  0 0-0 1     (LC) Lipid Panel [id number] 12:00AM Maria Del Rosario Large     Test Name Result Flag Reference   Cholesterol, Total 151 mg/dL  100-199   Triglycerides 187 mg/dL H 0-149   HDL Cholesterol 27 mg/dL L >39   According to ATP-III Guidelines, HDL-C >59 mg/dL is considered a  negative risk factor for CHD  VLDL Cholesterol Joseph 37 mg/dL  5-40   LDL Cholesterol Calc 87 mg/dL  0-99     [facility]) One Specimen Identifier [id number] 12:00AM Maria Del Rosario Large     Test Name Result Flag Reference   One Specimen Identifier Comment     The specimen received included only one patient identifier on the  primary collection container    Our laboratory accrediting agency  states "All primary specimen containers must be labeled with 2  identifiers at the time of collection "

## 2018-01-13 NOTE — MISCELLANEOUS
Message  Phone call from patient's wife who reports that patient used the NTG patch on the L foot and has swelling on the L foot  She is asking if the NTG patch can be used on the R foot instead of the L or both feet at the same time--she seems unclear as to how to use the patches  She read me the prescription label and based on that was instructed as follows: use 1 patch daily--put on for 12-14 hrs as directed then remove  Apply another patch the following day for 12-14 hrs and then remove  She was instructed that 2 patched should not be used at the same time  Alternating location of patch application daily is OK but only 1 patch for 12-14 hrs per day and then remove  She was advised to have the patient elevate his legs and feet and take his normal meds and if the swelling does not resolve to call the wound center again  She verbalized understanding of these instructions  Active Problems    1  Abnormal weight loss (783 21) (R63 4)   2  Acute pain of right knee (719 46) (M25 561)   3  Acute right lumbar radiculopathy (724 4) (M54 16)   4  Allergic rhinitis (477 9) (J30 9)   5  Anxiety disorder (300 00) (F41 9)   6  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444 9) (I74 9)   7  Benign hypertrophy of prostate (600 00) (N40 0)   8  CAD (coronary atherosclerotic disease) (414 00) (I25 10)   9  Cardiac pacemaker (V45 01) (Z95 0)   10  Central obesity (278 1) (E65)   11  Chondromalacia of patella, right (717 7) (M22 41)   12  Chronic kidney disease, stage 3 (585 3) (N18 3)   13  Chronic obstructive pulmonary disease (496) (J44 9)   14  Chronic pain (338 29) (G89 29)   15  Constipation (564 00) (K59 00)   16  Controlled diabetes mellitus type II without complication (635 48) (V53 1)   17  Degenerative cervical disc (722 4) (M50 30)   18  DM (diabetes mellitus), type 2 with peripheral vascular complications (994 47,575 53)    (E11 51)   19  Dysphagia (787 20) (R13 10)   20   Encounter for screening fecal occult blood testing (V76 51) (Z12 11)   21  Encounter for screening for malignant neoplasm of colon (V76 51) (Z12 11)   22  Esophageal reflux (530 81) (K21 9)   23  Fatigue (780 79) (R53 83)   24  High risk medication use (V58 69) (Z79 899)   25  Hoarseness (784 42) (R49 0)   26  Hyperlipidemia (272 4) (E78 5)   27  Hypertension (401 9) (I10)   28  Ischemic cardiomyopathy (414 8) (I25 5)   29  Kidney cysts (753 10) (N28 1)   30  Left foot pain (729 5) (M79 672)   31  Lower abdominal pain (789 09) (R10 30)   32  Lower back pain (724 2) (M54 5)   33  Lumbar radiculopathy (724 4) (M54 16)   34  Lumbosacral spondylosis (721 3) (M47 817)   35  Muscle spasms of neck (728 85) (M62 838)   36  Neck pain (723 1) (M54 2)   37  Need for influenza vaccination (V04 81) (Z23)   38  Need for shingles vaccine (V04 89) (Z23)   39  Neural foraminal stenosis of cervical spine (723 0) (M99 81)   40  Neuralgia (729 2) (M79 2)   41  Nicotine dependence (305 1) (F17 200)   42  Onychomycosis of toenail (110 1) (B35 1)   43  Other chronic pain (338 29) (G89 29)   44  Peripheral vascular disease (443 9) (I73 9)   45  Primary osteoarthritis of right knee (715 16) (M17 11)   46  Right foot pain (729 5) (M79 671)   47  Right hemisphere, cerebral infarction (434 91) (I63 9)   48  Right hip pain (719 45) (M25 551)   49  Right knee pain (719 46) (M25 561)   50  Shortness of breath (786 05) (R06 02)   51  Solitary lung nodule (793 11) (R91 1)   52  Type 2 diabetes mellitus with hyperglycemia (250 00) (E11 65)   53  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)  Requested for: 27MKB7350; Last   Rx:09Nov2016 Ordered   4  Carisoprodol 350 MG Oral Tablet; Take 1 tablet 4 times daily; Therapy: 34Coa8147 to (Evaluate:12Mar2017); Last Rx:57Zcu0651 Ordered   5  Clopidogrel Bisulfate 75 MG Oral Tablet (Plavix); TAKE 1 TABLET DAILY; Therapy: 94UGA2597 to ([facility])  Requested for: 97KYQ1857; Last   Rx:08Nov2016 Ordered   6  Enalapril Maleate 20 MG Oral Tablet; Take 1 tablet daily; Therapy: (YZSXKWOC:83IUI5902) to Recorded   7  Famotidine 20 MG Oral Tablet; take 1 tablet twice daily as directed; Therapy: 58TKF2192 to (Evaluate:27Apr2017)  Requested for: 29Oct2016; Last   Rx:29Oct2016 Ordered   8  Fluticasone Propionate 50 MCG/ACT Nasal Suspension; USE 2 SPRAYS IN EACH   NOSTRIL ONCE DAILY; Therapy: 68NYQ2930 to (Evaluate:19Oct2015)  Requested for: ; Last   Rx:24Oct2014 Ordered   9  FreeStyle Lancets Miscellaneous; test twice daily; Therapy: 07HVB1892 to (Evaluate:22Dxg4245); Last Rx:11Mar2013 Ordered   10  FreeStyle Lite Test In Vitro Strip; USED FOR TWICE DAILY TESTING 250 02; Therapy: 41JVI0684 to (Evaluate:94Pkv7875); Last Rx:11Mar2013 Ordered   11  Furosemide 40 MG Oral Tablet; Take 1 1/2 tablet daily; Therapy: 73Hra1653 to ([facility])  Requested for: 83XPI9705; Last    Rx:65Wex8700 Ordered   12  HYDROmorphone HCl - 2 MG Oral Tablet; TAKE 1-2 TABLET EVERY 4 HOURS AS    NEEDED FOR PAIN;    Therapy: 80FRO7409 to (Evaluate:16Mar2017); Last Rx:01Mar2017 Ordered   13  Isosorbide Mononitrate ER 30 MG Oral Tablet Extended Release 24 Hour; TAKE 1    TABLET ONCE DAILY; Therapy: 16Apr2012 to (Evaluate:29Apr2017)  Requested for: 31Oct2016; Last    Rx:31Oct2016 Ordered   14  Lidocaine 2 5 % Gel; Therapy: (Recorded:08Mar2017) to Recorded   15   Lidocaine 5 % External Patch; APPLY 1 PATCH TO THE AFFECTED AREA AND LEAVE    IN PLACE FOR 12 HOURS, THEN REMOVE AND LEAVE OFF FOR 12 HOURS; Therapy: 53PKE1109 to (Evaluate:19Mar2017); Last Rx:09Mar2017 Ordered   16  LORazepam 0 5 MG Oral Tablet; TAKE 1 TABLET EVERY 6 TO 8 HOURS AS NEEDED; Therapy: 18HLV6569 to (Evaluate:02Feb2017); Last Rx:03Jan2017 Ordered   17  Minitran 0 4 MG/HR Transdermal Patch 24 Hour; APPLY PATCH FOR 12 TO 14 HOURS    DAILY, THEN REMOVE;    Therapy: 47DYM9349 to (Evaluate:23Mar2017); Last Rx:09Mar2017 Ordered   18  Nicotine 21 MG/24HR Transdermal Patch 24 Hour; APPLY 1 PATCH DAILY AS    DIRECTED; Therapy: 94DHO5212 to (Evaluate:06Jun2016); Last AJ:99POL6663 Ordered   19  OxyCODONE HCl - 30 MG Oral Tablet; TAKE 1 TABLET every 4 hours as needed for    pain; Therapy: 94Vrh0409 to (Evaluate:01Apr2017); Last Rx:02Mar2017 Ordered   20  Proventil  (90 Base) MCG/ACT Inhalation Aerosol Solution; TAKE 2 PUFFS 4    TIMES A DAY AS NEEDED; Therapy: 79HOO1001 to (Evaluate:12Jun2017)  Requested for: 37YUY3141; Last    Rx:13Jan2017 Ordered   21  Sulfamethoxazole-Trimethoprim 800-160 MG Oral Tablet; Take 1 tablet twice daily; Therapy: 98BPF3773 to (Evaluate:18Mar2017)  Requested for: 02GUX9555; Last    Rx:08Mar2017 Ordered   22  Symbicort 160-4 5 MCG/ACT Inhalation Aerosol; INHALE 2 PUFFS TWICE DAILY  RINSE MOUTH AFTER USE; Therapy: 34ILY0355 to (Last Rx:86Ycu6335)  Requested for: 22IPN0837 Ordered   23  Tamsulosin HCl - 0 4 MG Oral Capsule (Flomax); take 1 capsule at bedtime; Therapy: 05ISB9605 to (Last Rx:23Jan2017)  Requested for: 91RKU8639 Ordered   24  Ventolin  (90 Base) MCG/ACT Inhalation Aerosol Solution; INHALE 2 PUFFS BY    MOUTH 4 TIMES A DAY AS NEEDED; Therapy: 46WYF4543 to (Evaluate:13Jun2017)  Requested for: 11KOA4450; Last    Rx:14Jan2017 Ordered    Allergies    1   Gabapentin CAPS    Signatures   Electronically signed by : No Szymanski RN; Mar 13 2017 10:11AM EST                       (Author)

## 2018-01-14 VITALS
SYSTOLIC BLOOD PRESSURE: 112 MMHG | BODY MASS INDEX: 26.28 KG/M2 | HEART RATE: 68 BPM | HEIGHT: 72 IN | DIASTOLIC BLOOD PRESSURE: 62 MMHG | RESPIRATION RATE: 20 BRPM | TEMPERATURE: 95 F | WEIGHT: 194 LBS

## 2018-01-14 VITALS
HEART RATE: 68 BPM | TEMPERATURE: 96.5 F | HEIGHT: 72 IN | BODY MASS INDEX: 26.68 KG/M2 | DIASTOLIC BLOOD PRESSURE: 64 MMHG | SYSTOLIC BLOOD PRESSURE: 124 MMHG | WEIGHT: 197 LBS | RESPIRATION RATE: 20 BRPM

## 2018-01-14 VITALS
WEIGHT: 208.5 LBS | TEMPERATURE: 97.3 F | DIASTOLIC BLOOD PRESSURE: 60 MMHG | HEIGHT: 70 IN | BODY MASS INDEX: 29.85 KG/M2 | SYSTOLIC BLOOD PRESSURE: 130 MMHG | HEART RATE: 90 BPM | OXYGEN SATURATION: 97 %

## 2018-01-15 VITALS
WEIGHT: 186.75 LBS | BODY MASS INDEX: 28.3 KG/M2 | OXYGEN SATURATION: 97 % | HEIGHT: 68 IN | DIASTOLIC BLOOD PRESSURE: 68 MMHG | SYSTOLIC BLOOD PRESSURE: 110 MMHG | TEMPERATURE: 98.3 F | HEART RATE: 60 BPM

## 2018-01-15 VITALS
OXYGEN SATURATION: 96 % | HEART RATE: 54 BPM | SYSTOLIC BLOOD PRESSURE: 142 MMHG | DIASTOLIC BLOOD PRESSURE: 78 MMHG | BODY MASS INDEX: 29.78 KG/M2 | TEMPERATURE: 97.8 F | WEIGHT: 196.5 LBS | HEIGHT: 68 IN

## 2018-01-26 ENCOUNTER — TELEPHONE (OUTPATIENT)
Dept: FAMILY MEDICINE CLINIC | Facility: CLINIC | Age: 73
End: 2018-01-26

## 2018-01-26 DIAGNOSIS — G89.4 CHRONIC PAIN SYNDROME: Primary | ICD-10-CM

## 2018-01-26 RX ORDER — OXYCODONE HYDROCHLORIDE 30 MG/1
1 TABLET ORAL EVERY 4 HOURS PRN
COMMUNITY
Start: 2016-08-30 | End: 2018-01-26 | Stop reason: SDUPTHER

## 2018-01-26 RX ORDER — OXYCODONE HYDROCHLORIDE 30 MG/1
30 TABLET ORAL EVERY 4 HOURS PRN
Qty: 180 TABLET | Refills: 0 | Status: SHIPPED | OUTPATIENT
Start: 2018-01-26 | End: 2018-02-06 | Stop reason: SDUPTHER

## 2018-02-02 ENCOUNTER — TELEPHONE (OUTPATIENT)
Dept: FAMILY MEDICINE CLINIC | Facility: CLINIC | Age: 73
End: 2018-02-02

## 2018-02-02 NOTE — TELEPHONE ENCOUNTER
DURING THIS PERIOD OF NOT GETTING OUR FAXES, I HAVE BEEN ADVISING PT TO CONTACT PHARMACY TO RUN THE RX THRU TO SEE IF IT IS AUTHORIZED

## 2018-02-02 NOTE — TELEPHONE ENCOUNTER
----- Message from Gia Garcia sent at 1/26/2018  4:43 PM EST -----  Regarding: RE: prior authorization  Prior was faxed, please watch for answer  ----- Message -----  From: Omari Goodwin DO  Sent: 1/26/2018   2:32 PM  To: [facility]  Subject: prior authorization                              Information faxed as urgent  Prescription printed out, can try to get partial fill until we hear back from insurance

## 2018-02-02 NOTE — TELEPHONE ENCOUNTER
----- Message from Baldemar Miner sent at 1/26/2018  4:43 PM EST -----  Regarding: RE: prior authorization  Prior was faxed, please watch for answer  ----- Message -----  From: Joy Pinzon DO  Sent: 1/26/2018   2:32 PM  To: [facility]  Subject: prior authorization                              Information faxed as urgent  Prescription printed out, can try to get partial fill until we hear back from insurance

## 2018-02-06 DIAGNOSIS — G89.4 CHRONIC PAIN SYNDROME: ICD-10-CM

## 2018-02-06 DIAGNOSIS — J44.9 CHRONIC OBSTRUCTIVE PULMONARY DISEASE, UNSPECIFIED COPD TYPE (HCC): Primary | ICD-10-CM

## 2018-02-06 DIAGNOSIS — L97.509 ULCER OF TOE, UNSPECIFIED LATERALITY, UNSPECIFIED ULCER STAGE (HCC): ICD-10-CM

## 2018-02-06 RX ORDER — BUDESONIDE AND FORMOTEROL FUMARATE DIHYDRATE 160; 4.5 UG/1; UG/1
2 AEROSOL RESPIRATORY (INHALATION) 2 TIMES DAILY
COMMUNITY
Start: 2013-07-23 | End: 2018-02-06 | Stop reason: SDUPTHER

## 2018-02-06 RX ORDER — BUDESONIDE AND FORMOTEROL FUMARATE DIHYDRATE 160; 4.5 UG/1; UG/1
2 AEROSOL RESPIRATORY (INHALATION) 2 TIMES DAILY
Qty: 1 INHALER | Refills: 5 | Status: SHIPPED | OUTPATIENT
Start: 2018-02-06 | End: 2019-08-16 | Stop reason: SDUPTHER

## 2018-02-06 RX ORDER — OXYCODONE HYDROCHLORIDE 30 MG/1
30 TABLET ORAL EVERY 4 HOURS PRN
Qty: 180 TABLET | Refills: 0 | Status: SHIPPED | OUTPATIENT
Start: 2018-02-06 | End: 2018-03-15 | Stop reason: SDUPTHER

## 2018-02-06 NOTE — TELEPHONE ENCOUNTER
Patient has also been coughing with flemm for 2 weeks and patient doesn't feel comfortable coming in to the office because he does not want to get any worse  Wanted an antibiotic prescribed

## 2018-03-15 ENCOUNTER — TELEPHONE (OUTPATIENT)
Dept: FAMILY MEDICINE CLINIC | Facility: CLINIC | Age: 73
End: 2018-03-15

## 2018-03-15 DIAGNOSIS — G89.4 CHRONIC PAIN SYNDROME: ICD-10-CM

## 2018-03-15 RX ORDER — OXYCODONE HYDROCHLORIDE 30 MG/1
30 TABLET ORAL EVERY 4 HOURS PRN
Qty: 180 TABLET | Refills: 0 | Status: SHIPPED | OUTPATIENT
Start: 2018-03-15 | End: 2018-04-05 | Stop reason: SDUPTHER

## 2018-03-20 DIAGNOSIS — J44.9 CHRONIC OBSTRUCTIVE PULMONARY DISEASE, UNSPECIFIED COPD TYPE (HCC): ICD-10-CM

## 2018-03-27 DIAGNOSIS — L97.509 ULCER OF TOE, UNSPECIFIED LATERALITY, UNSPECIFIED ULCER STAGE (HCC): ICD-10-CM

## 2018-03-30 ENCOUNTER — TELEPHONE (OUTPATIENT)
Dept: FAMILY MEDICINE CLINIC | Facility: CLINIC | Age: 73
End: 2018-03-30

## 2018-03-30 DIAGNOSIS — I73.9 PERIPHERAL VASCULAR DISEASE (HCC): ICD-10-CM

## 2018-03-30 DIAGNOSIS — I63.9 RIGHT HEMISPHERE, CEREBRAL INFARCTION (HCC): ICD-10-CM

## 2018-03-30 DIAGNOSIS — I25.10 CORONARY ATHEROSCLEROSIS DUE TO CALCIFIED CORONARY LESION: Primary | ICD-10-CM

## 2018-03-30 DIAGNOSIS — I25.84 CORONARY ATHEROSCLEROSIS DUE TO CALCIFIED CORONARY LESION: Primary | ICD-10-CM

## 2018-03-30 RX ORDER — CLOPIDOGREL BISULFATE 75 MG/1
75 TABLET ORAL DAILY
Refills: 5 | COMMUNITY
Start: 2018-02-22 | End: 2018-03-30 | Stop reason: SDUPTHER

## 2018-03-30 RX ORDER — CLOPIDOGREL BISULFATE 75 MG/1
75 TABLET ORAL DAILY
Qty: 30 TABLET | Refills: 2 | Status: SHIPPED | OUTPATIENT
Start: 2018-03-30 | End: 2018-03-30 | Stop reason: SDUPTHER

## 2018-03-30 RX ORDER — CLOPIDOGREL BISULFATE 75 MG/1
75 TABLET ORAL DAILY
Qty: 90 TABLET | Refills: 0 | Status: SHIPPED | OUTPATIENT
Start: 2018-03-30 | End: 2018-05-29 | Stop reason: SDUPTHER

## 2018-04-05 DIAGNOSIS — G89.4 CHRONIC PAIN SYNDROME: ICD-10-CM

## 2018-04-05 RX ORDER — OXYCODONE HYDROCHLORIDE 30 MG/1
TABLET ORAL
Qty: 180 TABLET | Refills: 0 | Status: SHIPPED | OUTPATIENT
Start: 2018-04-05 | End: 2018-05-09 | Stop reason: SDUPTHER

## 2018-04-09 DIAGNOSIS — M62.838 MUSCLE SPASM: Primary | ICD-10-CM

## 2018-04-09 RX ORDER — CARISOPRODOL 350 MG/1
350 TABLET ORAL 4 TIMES DAILY
Qty: 80 TABLET | Refills: 0 | Status: SHIPPED | OUTPATIENT
Start: 2018-04-09 | End: 2018-06-04 | Stop reason: SDUPTHER

## 2018-04-09 RX ORDER — CARISOPRODOL 350 MG/1
350 TABLET ORAL 4 TIMES DAILY
Qty: 30 TABLET | Refills: 0 | Status: CANCELLED | OUTPATIENT
Start: 2018-04-09

## 2018-04-09 RX ORDER — CARISOPRODOL 350 MG/1
350 TABLET ORAL 4 TIMES DAILY
Refills: 0 | COMMUNITY
Start: 2018-01-20 | End: 2018-04-09 | Stop reason: SDUPTHER

## 2018-04-23 DIAGNOSIS — J44.9 CHRONIC OBSTRUCTIVE PULMONARY DISEASE, UNSPECIFIED COPD TYPE (HCC): ICD-10-CM

## 2018-05-07 ENCOUNTER — TELEPHONE (OUTPATIENT)
Dept: FAMILY MEDICINE CLINIC | Facility: CLINIC | Age: 73
End: 2018-05-07

## 2018-05-07 DIAGNOSIS — I25.118 CORONARY ARTERY DISEASE OF NATIVE HEART WITH STABLE ANGINA PECTORIS, UNSPECIFIED VESSEL OR LESION TYPE (HCC): Primary | ICD-10-CM

## 2018-05-07 PROBLEM — IMO0002 ULCERATION: Status: ACTIVE | Noted: 2017-04-11

## 2018-05-07 PROBLEM — H02.849 EDEMA EYELID: Status: ACTIVE | Noted: 2017-04-11

## 2018-05-07 PROBLEM — M79.671 RIGHT FOOT PAIN: Status: ACTIVE | Noted: 2017-01-09

## 2018-05-07 PROBLEM — I74.9 ARTERIAL EMBOLISM (HCC): Status: ACTIVE | Noted: 2017-03-09

## 2018-05-07 PROBLEM — M79.676 PAIN OF FIFTH TOE: Status: ACTIVE | Noted: 2017-04-11

## 2018-05-07 PROBLEM — M79.672 LEFT FOOT PAIN: Status: ACTIVE | Noted: 2017-01-09

## 2018-05-07 PROBLEM — E11.51 DM (DIABETES MELLITUS), TYPE 2 WITH PERIPHERAL VASCULAR COMPLICATIONS (HCC): Status: ACTIVE | Noted: 2017-03-01

## 2018-05-07 PROBLEM — J42 CHRONIC BRONCHITIS (HCC): Status: ACTIVE | Noted: 2017-06-02

## 2018-05-07 PROBLEM — J44.1 ACUTE EXACERBATION OF CHRONIC OBSTRUCTIVE BRONCHITIS (HCC): Status: ACTIVE | Noted: 2017-06-03

## 2018-05-07 RX ORDER — ISOSORBIDE MONONITRATE 30 MG/1
1 TABLET, EXTENDED RELEASE ORAL DAILY
COMMUNITY
Start: 2012-04-16 | End: 2018-05-07 | Stop reason: SDUPTHER

## 2018-05-07 RX ORDER — FUROSEMIDE 40 MG/1
1.5 TABLET ORAL DAILY
COMMUNITY
Start: 2012-08-31 | End: 2018-05-31 | Stop reason: SDUPTHER

## 2018-05-07 RX ORDER — FLUTICASONE PROPIONATE 50 MCG
2 SPRAY, SUSPENSION (ML) NASAL DAILY
COMMUNITY
Start: 2013-05-06 | End: 2019-03-21 | Stop reason: SDUPTHER

## 2018-05-07 RX ORDER — TAMSULOSIN HYDROCHLORIDE 0.4 MG/1
0.4 CAPSULE ORAL
Refills: 1 | COMMUNITY
Start: 2018-04-22 | End: 2018-07-18 | Stop reason: SDUPTHER

## 2018-05-07 RX ORDER — LORAZEPAM 0.5 MG/1
1 TABLET ORAL EVERY 6 HOURS PRN
COMMUNITY
Start: 2012-06-15 | End: 2019-02-27 | Stop reason: SDUPTHER

## 2018-05-07 RX ORDER — LIDOCAINE 50 MG/G
1 PATCH TOPICAL DAILY
COMMUNITY
Start: 2017-03-09 | End: 2018-06-15 | Stop reason: SDUPTHER

## 2018-05-07 RX ORDER — FAMOTIDINE 20 MG/1
1 TABLET, FILM COATED ORAL 2 TIMES DAILY
COMMUNITY
Start: 2012-01-17

## 2018-05-07 RX ORDER — LANCETS 28 GAUGE
1 EACH MISCELLANEOUS 2 TIMES DAILY
COMMUNITY
Start: 2013-03-11 | End: 2020-03-31 | Stop reason: ALTCHOICE

## 2018-05-07 RX ORDER — NITROGLYCERIN 80 MG/1
1 PATCH TRANSDERMAL DAILY
COMMUNITY
Start: 2017-03-09 | End: 2019-05-13 | Stop reason: SDUPTHER

## 2018-05-07 RX ORDER — ENALAPRIL MALEATE 20 MG/1
1 TABLET ORAL DAILY
COMMUNITY
End: 2018-06-15 | Stop reason: ALTCHOICE

## 2018-05-07 RX ORDER — AMLODIPINE BESYLATE 2.5 MG/1
1 TABLET ORAL DAILY
COMMUNITY
Start: 2012-01-27 | End: 2018-10-22 | Stop reason: SDUPTHER

## 2018-05-07 RX ORDER — ISOSORBIDE MONONITRATE 30 MG/1
30 TABLET, EXTENDED RELEASE ORAL DAILY
Qty: 30 TABLET | Refills: 0 | Status: SHIPPED | OUTPATIENT
Start: 2018-05-07 | End: 2018-06-03 | Stop reason: SDUPTHER

## 2018-05-07 RX ORDER — NICOTINE 21 MG/24HR
1 PATCH, TRANSDERMAL 24 HOURS TRANSDERMAL DAILY
COMMUNITY
Start: 2016-05-09 | End: 2019-06-18 | Stop reason: ALTCHOICE

## 2018-05-07 RX ORDER — ATENOLOL 50 MG/1
0.5 TABLET ORAL DAILY
COMMUNITY
Start: 2011-05-19 | End: 2018-06-15 | Stop reason: SDUPTHER

## 2018-05-07 RX ORDER — ATORVASTATIN CALCIUM 80 MG/1
1 TABLET, FILM COATED ORAL DAILY
COMMUNITY
Start: 2016-10-18 | End: 2018-11-14 | Stop reason: SDUPTHER

## 2018-05-07 NOTE — TELEPHONE ENCOUNTER
dont think I denied this medication  Maybe was sent to cardiology?   He needs to be on this medication,  Sent renewal to pharmacy

## 2018-05-07 NOTE — TELEPHONE ENCOUNTER
[address] DENIED FILLING JOHNIE'S ISOSORBIDE  DO YOU NOT WANT HIM TO TAKE THIS? PLEASE ADVISE

## 2018-05-09 DIAGNOSIS — G89.4 CHRONIC PAIN SYNDROME: ICD-10-CM

## 2018-05-09 NOTE — TELEPHONE ENCOUNTER
Pt's wife called requesting a med refill for ativan 0 5 mg 1 tab 3x a day as needed, Ativan not on med list     Pt also left her cell number  [phone]

## 2018-05-10 RX ORDER — OXYCODONE HYDROCHLORIDE 30 MG/1
TABLET ORAL
Qty: 180 TABLET | Refills: 0 | Status: SHIPPED | OUTPATIENT
Start: 2018-05-10 | End: 2018-06-06 | Stop reason: SDUPTHER

## 2018-05-12 DIAGNOSIS — L97.509 ULCER OF TOE, UNSPECIFIED LATERALITY, UNSPECIFIED ULCER STAGE (HCC): ICD-10-CM

## 2018-05-28 DIAGNOSIS — I25.10 CORONARY ATHEROSCLEROSIS DUE TO CALCIFIED CORONARY LESION: ICD-10-CM

## 2018-05-28 DIAGNOSIS — I25.84 CORONARY ATHEROSCLEROSIS DUE TO CALCIFIED CORONARY LESION: ICD-10-CM

## 2018-05-28 DIAGNOSIS — I73.9 PERIPHERAL VASCULAR DISEASE (HCC): ICD-10-CM

## 2018-05-28 DIAGNOSIS — I63.9 RIGHT HEMISPHERE, CEREBRAL INFARCTION (HCC): ICD-10-CM

## 2018-05-29 DIAGNOSIS — I63.9 RIGHT HEMISPHERE, CEREBRAL INFARCTION (HCC): ICD-10-CM

## 2018-05-29 DIAGNOSIS — I25.10 CORONARY ATHEROSCLEROSIS DUE TO CALCIFIED CORONARY LESION: ICD-10-CM

## 2018-05-29 DIAGNOSIS — I73.9 PERIPHERAL VASCULAR DISEASE (HCC): ICD-10-CM

## 2018-05-29 DIAGNOSIS — I25.84 CORONARY ATHEROSCLEROSIS DUE TO CALCIFIED CORONARY LESION: ICD-10-CM

## 2018-05-29 DIAGNOSIS — J44.9 CHRONIC OBSTRUCTIVE PULMONARY DISEASE, UNSPECIFIED COPD TYPE (HCC): ICD-10-CM

## 2018-05-29 RX ORDER — CLOPIDOGREL BISULFATE 75 MG/1
75 TABLET ORAL DAILY
Qty: 30 TABLET | Refills: 0 | Status: SHIPPED | OUTPATIENT
Start: 2018-05-29 | End: 2018-07-02 | Stop reason: SDUPTHER

## 2018-05-30 RX ORDER — CLOPIDOGREL BISULFATE 75 MG/1
75 TABLET ORAL DAILY
Qty: 90 TABLET | Refills: 0 | Status: SHIPPED | OUTPATIENT
Start: 2018-05-30 | End: 2018-06-15 | Stop reason: SDUPTHER

## 2018-05-31 DIAGNOSIS — I10 ESSENTIAL HYPERTENSION: Primary | ICD-10-CM

## 2018-05-31 RX ORDER — FUROSEMIDE 40 MG/1
TABLET ORAL
Qty: 45 TABLET | Refills: 5 | Status: SHIPPED | OUTPATIENT
Start: 2018-05-31 | End: 2018-06-18 | Stop reason: SDUPTHER

## 2018-06-03 DIAGNOSIS — I25.118 CORONARY ARTERY DISEASE OF NATIVE HEART WITH STABLE ANGINA PECTORIS, UNSPECIFIED VESSEL OR LESION TYPE (HCC): ICD-10-CM

## 2018-06-04 DIAGNOSIS — M62.838 MUSCLE SPASM: ICD-10-CM

## 2018-06-04 RX ORDER — ISOSORBIDE MONONITRATE 30 MG/1
TABLET, EXTENDED RELEASE ORAL
Qty: 30 TABLET | Refills: 0 | Status: SHIPPED | OUTPATIENT
Start: 2018-06-04 | End: 2018-06-15 | Stop reason: ALTCHOICE

## 2018-06-05 RX ORDER — CARISOPRODOL 350 MG/1
350 TABLET ORAL 4 TIMES DAILY
Qty: 80 TABLET | Refills: 0 | Status: SHIPPED | OUTPATIENT
Start: 2018-06-05 | End: 2018-07-11 | Stop reason: SDUPTHER

## 2018-06-06 DIAGNOSIS — G89.4 CHRONIC PAIN SYNDROME: ICD-10-CM

## 2018-06-07 ENCOUNTER — TELEPHONE (OUTPATIENT)
Dept: FAMILY MEDICINE CLINIC | Facility: CLINIC | Age: 73
End: 2018-06-07

## 2018-06-07 NOTE — TELEPHONE ENCOUNTER
JOHNIE MCDONALD HAS AN APPT SCHEDULED FOR 6/15, NEXT Friday AT 10:30  PLEASE SEND HIS PAIN MEDS INTO THE PHARMACY - HE WILL BE OUT TOMORROW  THANK YOU

## 2018-06-07 NOTE — TELEPHONE ENCOUNTER
Pt due for office visit  Requesting renewal of pain medication  Not seen since January  Please schedule  I did not send rx yet, let me know when scheduled

## 2018-06-08 RX ORDER — OXYCODONE HYDROCHLORIDE 30 MG/1
TABLET ORAL
Qty: 180 TABLET | Refills: 0 | Status: SHIPPED | OUTPATIENT
Start: 2018-06-08 | End: 2018-06-28 | Stop reason: SDUPTHER

## 2018-06-15 ENCOUNTER — OFFICE VISIT (OUTPATIENT)
Dept: FAMILY MEDICINE CLINIC | Facility: CLINIC | Age: 73
End: 2018-06-15
Payer: MEDICARE

## 2018-06-15 VITALS
HEIGHT: 70 IN | SYSTOLIC BLOOD PRESSURE: 118 MMHG | HEART RATE: 47 BPM | DIASTOLIC BLOOD PRESSURE: 68 MMHG | BODY MASS INDEX: 28.63 KG/M2 | WEIGHT: 200 LBS | OXYGEN SATURATION: 97 % | TEMPERATURE: 97.4 F

## 2018-06-15 DIAGNOSIS — D50.8 IRON DEFICIENCY ANEMIA SECONDARY TO INADEQUATE DIETARY IRON INTAKE: ICD-10-CM

## 2018-06-15 DIAGNOSIS — E11.9 CONTROLLED TYPE 2 DIABETES MELLITUS WITHOUT COMPLICATION, WITHOUT LONG-TERM CURRENT USE OF INSULIN (HCC): Primary | ICD-10-CM

## 2018-06-15 DIAGNOSIS — I10 ESSENTIAL HYPERTENSION: ICD-10-CM

## 2018-06-15 DIAGNOSIS — R73.03 BORDERLINE TYPE 2 DIABETES MELLITUS: ICD-10-CM

## 2018-06-15 DIAGNOSIS — R00.1 BRADYCARDIA: ICD-10-CM

## 2018-06-15 LAB — SL AMB POCT HEMOGLOBIN AIC: 6.1

## 2018-06-15 PROCEDURE — 99214 OFFICE O/P EST MOD 30 MIN: CPT | Performed by: FAMILY MEDICINE

## 2018-06-15 PROCEDURE — 83036 HEMOGLOBIN GLYCOSYLATED A1C: CPT | Performed by: FAMILY MEDICINE

## 2018-06-15 RX ORDER — ATENOLOL 25 MG/1
TABLET ORAL
Qty: 15 TABLET | Refills: 2 | Status: SHIPPED | OUTPATIENT
Start: 2018-06-15 | End: 2018-09-09 | Stop reason: SDUPTHER

## 2018-06-15 NOTE — PATIENT INSTRUCTIONS
Iron containing foods  Decrease atenolol to 25mg tabs- I sent in a new prescription for a lower dose pill  Take 1/2 tab daily   Get blood work to check blood count, and iron level, do not have to fast   Call in 1 week with blood pressure and pulse at different times of the day, once a day

## 2018-06-15 NOTE — PROGRESS NOTES
Assessment/Plan:      Diagnoses and all orders for this visit:    Controlled type 2 diabetes mellitus without complication, without long-term current use of insulin (HCC)    Bradycardia    Iron deficiency anemia secondary to inadequate dietary iron intake  -     CBC and differential; Future  -     Iron; Future    Essential hypertension  -     atenolol (TENORMIN) 25 mg tablet; 1/2 tab daily, dosage change    Borderline type 2 diabetes mellitus  -     POCT hemoglobin A1c        Type 2 dm: controlled, hba1c 6 1, foot exam  Bradycardia: decrease atenolol 25mg 1/2 tab daily , monitor with home blood pressure cuff and pulse  Iron deficiency anemia: unable to tolerate iron due to constipation pt will get cbc and iron  Hypertension: controlled    Subjective:     Patient ID: Rajendra Conroy  is a 68 y o  male  Pt recently seen by cardiology and Stopped isosorbide  His pulse is low  He denies chest pain or shortness of breath  His pulse is low  He states he feels tired  He states he is taking 25mg atenolol daily  He has iron deficiency anemia but he is intolerant to iron, it made him too constipated even taking every other day  Review of Systems      The following portions of the patient's history were reviewed and updated as appropriate: allergies, current medications, past family history, past medical history, past social history, past surgical history and problem list     Objective:  Vitals:    06/15/18 1050   BP: 118/68   Pulse: (!) 47   Temp: (!) 97 4 °F (36 3 °C)   SpO2: 97%   Weight: 90 7 kg (200 lb)   Height: 5' 9 5" (1 765 m)      Physical Exam   Constitutional: He is oriented to person, place, and time  He appears well-developed  Thin appearing    HENT:   Head: Normocephalic and atraumatic  Cardiovascular: Normal rate, regular rhythm and normal heart sounds  Pulses are weak pulses  Pulmonary/Chest: Effort normal and breath sounds normal    Abdominal: Soft   Bowel sounds are normal    Feet: Right Foot:   Skin Integrity: Negative for ulcer, skin breakdown, erythema, warmth, callus or dry skin  Left Foot:   Skin Integrity: Negative for ulcer, skin breakdown, erythema, warmth, callus or dry skin  Neurological: He is alert and oriented to person, place, and time  Skin: Skin is warm and dry  Psychiatric: He has a normal mood and affect  His behavior is normal  Judgment and thought content normal    Nursing note and vitals reviewed  Patient's shoes and socks removed  Right Foot/Ankle   Right Foot Inspection  Skin Exam: skin normal and skin intact no dry skin, no warmth, no callus, no erythema, no maceration, no abnormal color, no pre-ulcer, no ulcer and no callus                          Toe Exam: ROM and strength within normal limits  Sensory   Vibration: intact  Proprioception: intact   Monofilament testing: intact  Vascular  Capillary refills: elevated      Left Foot/Ankle  Left Foot Inspection  Skin Exam: skin normal and skin intactno dry skin, no warmth, no erythema, no maceration, normal color, no pre-ulcer, no ulcer and no callus                         Toe Exam: ROM and strength within normal limits                   Sensory   Vibration: intact  Proprioception: intact  Monofilament: intact  Vascular  Capillary refills: elevated    Assign Risk Category:  Deformity present;  No loss of protective sensation; Weak pulses       Risk: 1

## 2018-06-18 DIAGNOSIS — I10 ESSENTIAL HYPERTENSION: ICD-10-CM

## 2018-06-18 NOTE — TELEPHONE ENCOUNTER
Patient needing refills of the following medications sent to [facility]    Enalapril maleate 20mg 1 daily    Lasix 40mg    I did not see the enalapril on the patient's med list but his wife spelled it out for me twice as one of the medications he's taking      Thanks

## 2018-06-19 DIAGNOSIS — I10 ESSENTIAL HYPERTENSION: Primary | ICD-10-CM

## 2018-06-19 RX ORDER — FUROSEMIDE 40 MG/1
60 TABLET ORAL DAILY
Qty: 45 TABLET | Refills: 2 | Status: SHIPPED | OUTPATIENT
Start: 2018-06-19 | End: 2019-03-22 | Stop reason: SDUPTHER

## 2018-06-19 RX ORDER — ENALAPRIL MALEATE 20 MG/1
20 TABLET ORAL DAILY
Qty: 30 TABLET | Refills: 2 | Status: SHIPPED | OUTPATIENT
Start: 2018-06-19 | End: 2018-09-14 | Stop reason: SDUPTHER

## 2018-06-28 DIAGNOSIS — G89.4 CHRONIC PAIN SYNDROME: ICD-10-CM

## 2018-06-28 NOTE — TELEPHONE ENCOUNTER
Patient's wife called requesting refills on her 's oxycodone to be [facility]  States that she wanted to request the medication before you leave for vacation  Please advise

## 2018-07-02 DIAGNOSIS — I25.10 CORONARY ATHEROSCLEROSIS DUE TO CALCIFIED CORONARY LESION: ICD-10-CM

## 2018-07-02 DIAGNOSIS — I25.84 CORONARY ATHEROSCLEROSIS DUE TO CALCIFIED CORONARY LESION: ICD-10-CM

## 2018-07-02 DIAGNOSIS — I63.9 RIGHT HEMISPHERE, CEREBRAL INFARCTION (HCC): ICD-10-CM

## 2018-07-02 DIAGNOSIS — I73.9 PERIPHERAL VASCULAR DISEASE (HCC): ICD-10-CM

## 2018-07-02 RX ORDER — CLOPIDOGREL BISULFATE 75 MG/1
75 TABLET ORAL DAILY
Qty: 30 TABLET | Refills: 5 | Status: SHIPPED | OUTPATIENT
Start: 2018-07-02 | End: 2019-04-04 | Stop reason: SDUPTHER

## 2018-07-04 RX ORDER — OXYCODONE HYDROCHLORIDE 30 MG/1
TABLET ORAL
Qty: 180 TABLET | Refills: 0 | Status: SHIPPED | OUTPATIENT
Start: 2018-07-06 | End: 2018-07-09 | Stop reason: SDUPTHER

## 2018-07-09 DIAGNOSIS — G89.4 CHRONIC PAIN SYNDROME: ICD-10-CM

## 2018-07-09 RX ORDER — OXYCODONE HYDROCHLORIDE 30 MG/1
TABLET ORAL
Qty: 180 TABLET | Refills: 0 | Status: SHIPPED | OUTPATIENT
Start: 2018-07-09 | End: 2018-08-06 | Stop reason: SDUPTHER

## 2018-07-11 DIAGNOSIS — M62.838 MUSCLE SPASM: ICD-10-CM

## 2018-07-11 RX ORDER — CARISOPRODOL 350 MG/1
350 TABLET ORAL 4 TIMES DAILY
Qty: 80 TABLET | Refills: 0 | Status: SHIPPED | OUTPATIENT
Start: 2018-07-11 | End: 2018-08-14 | Stop reason: SDUPTHER

## 2018-07-18 DIAGNOSIS — N40.1 BENIGN PROSTATIC HYPERPLASIA WITH URINARY FREQUENCY: Primary | ICD-10-CM

## 2018-07-18 DIAGNOSIS — R35.0 BENIGN PROSTATIC HYPERPLASIA WITH URINARY FREQUENCY: Primary | ICD-10-CM

## 2018-07-18 RX ORDER — TAMSULOSIN HYDROCHLORIDE 0.4 MG/1
CAPSULE ORAL
Qty: 90 CAPSULE | Refills: 1 | Status: SHIPPED | OUTPATIENT
Start: 2018-07-18 | End: 2018-11-14 | Stop reason: SDUPTHER

## 2018-08-06 DIAGNOSIS — G89.4 CHRONIC PAIN SYNDROME: ICD-10-CM

## 2018-08-06 RX ORDER — OXYCODONE HYDROCHLORIDE 30 MG/1
TABLET ORAL
Qty: 180 TABLET | Refills: 0 | Status: SHIPPED | OUTPATIENT
Start: 2018-08-06 | End: 2018-09-01 | Stop reason: SDUPTHER

## 2018-08-13 ENCOUNTER — TELEPHONE (OUTPATIENT)
Dept: FAMILY MEDICINE CLINIC | Facility: CLINIC | Age: 73
End: 2018-08-13

## 2018-08-14 DIAGNOSIS — M62.838 MUSCLE SPASM: ICD-10-CM

## 2018-08-14 RX ORDER — CARISOPRODOL 350 MG/1
350 TABLET ORAL 4 TIMES DAILY
Qty: 80 TABLET | Refills: 0 | Status: SHIPPED | OUTPATIENT
Start: 2018-08-14 | End: 2018-09-14 | Stop reason: SDUPTHER

## 2018-08-21 DIAGNOSIS — J44.9 CHRONIC OBSTRUCTIVE PULMONARY DISEASE, UNSPECIFIED COPD TYPE (HCC): ICD-10-CM

## 2018-09-01 DIAGNOSIS — G89.4 CHRONIC PAIN SYNDROME: ICD-10-CM

## 2018-09-04 DIAGNOSIS — G89.4 CHRONIC PAIN SYNDROME: ICD-10-CM

## 2018-09-04 RX ORDER — OXYCODONE HYDROCHLORIDE 30 MG/1
TABLET ORAL
Qty: 180 TABLET | Refills: 0 | Status: SHIPPED | OUTPATIENT
Start: 2018-09-04 | End: 2018-09-04 | Stop reason: SDUPTHER

## 2018-09-04 RX ORDER — OXYCODONE HYDROCHLORIDE 30 MG/1
TABLET ORAL
Qty: 180 TABLET | Refills: 0 | Status: SHIPPED | OUTPATIENT
Start: 2018-09-04 | End: 2018-09-28 | Stop reason: SDUPTHER

## 2018-09-09 DIAGNOSIS — I10 ESSENTIAL HYPERTENSION: ICD-10-CM

## 2018-09-10 RX ORDER — ATENOLOL 25 MG/1
TABLET ORAL
Qty: 15 TABLET | Refills: 2 | Status: SHIPPED | OUTPATIENT
Start: 2018-09-10 | End: 2018-09-12 | Stop reason: SDUPTHER

## 2018-09-12 DIAGNOSIS — I10 ESSENTIAL HYPERTENSION: ICD-10-CM

## 2018-09-12 RX ORDER — ATENOLOL 25 MG/1
TABLET ORAL
Qty: 15 TABLET | Refills: 2 | Status: SHIPPED | OUTPATIENT
Start: 2018-09-12 | End: 2018-12-10 | Stop reason: SDUPTHER

## 2018-09-14 DIAGNOSIS — M62.838 MUSCLE SPASM: ICD-10-CM

## 2018-09-14 DIAGNOSIS — I10 ESSENTIAL HYPERTENSION: ICD-10-CM

## 2018-09-14 RX ORDER — CARISOPRODOL 350 MG/1
350 TABLET ORAL 4 TIMES DAILY
Qty: 80 TABLET | Refills: 0 | Status: SHIPPED | OUTPATIENT
Start: 2018-09-14 | End: 2018-10-22 | Stop reason: SDUPTHER

## 2018-09-14 RX ORDER — ENALAPRIL MALEATE 20 MG/1
TABLET ORAL
Qty: 30 TABLET | Refills: 2 | Status: SHIPPED | OUTPATIENT
Start: 2018-09-14 | End: 2018-09-14 | Stop reason: SDUPTHER

## 2018-09-14 RX ORDER — ENALAPRIL MALEATE 20 MG/1
20 TABLET ORAL DAILY
Qty: 30 TABLET | Refills: 0 | Status: SHIPPED | OUTPATIENT
Start: 2018-09-14 | End: 2018-12-20 | Stop reason: SDUPTHER

## 2018-09-28 DIAGNOSIS — G89.4 CHRONIC PAIN SYNDROME: ICD-10-CM

## 2018-09-28 RX ORDER — OXYCODONE HYDROCHLORIDE 30 MG/1
TABLET ORAL
Qty: 180 TABLET | Refills: 0 | Status: SHIPPED | OUTPATIENT
Start: 2018-09-28 | End: 2018-10-29 | Stop reason: SDUPTHER

## 2018-09-28 NOTE — TELEPHONE ENCOUNTER
Spoke with patients wife, they are aware they need an appointment for this Dx, they are waiting on SS Check to come in and will make an appointment

## 2018-10-22 DIAGNOSIS — M62.838 MUSCLE SPASM: ICD-10-CM

## 2018-10-22 DIAGNOSIS — I10 ESSENTIAL HYPERTENSION: Primary | ICD-10-CM

## 2018-10-22 RX ORDER — AMLODIPINE BESYLATE 2.5 MG/1
2.5 TABLET ORAL DAILY
Qty: 30 TABLET | Refills: 0 | Status: SHIPPED | OUTPATIENT
Start: 2018-10-22 | End: 2018-11-20 | Stop reason: SDUPTHER

## 2018-10-22 RX ORDER — CARISOPRODOL 350 MG/1
350 TABLET ORAL 4 TIMES DAILY
Qty: 80 TABLET | Refills: 0 | Status: SHIPPED | OUTPATIENT
Start: 2018-10-22 | End: 2018-11-23 | Stop reason: SDUPTHER

## 2018-10-24 DIAGNOSIS — J44.9 CHRONIC OBSTRUCTIVE PULMONARY DISEASE, UNSPECIFIED COPD TYPE (HCC): ICD-10-CM

## 2018-10-29 DIAGNOSIS — G89.4 CHRONIC PAIN SYNDROME: ICD-10-CM

## 2018-10-29 RX ORDER — OXYCODONE HYDROCHLORIDE 30 MG/1
TABLET ORAL
Qty: 180 TABLET | Refills: 0 | Status: SHIPPED | OUTPATIENT
Start: 2018-10-29 | End: 2018-11-26 | Stop reason: SDUPTHER

## 2018-11-14 DIAGNOSIS — E78.49 OTHER HYPERLIPIDEMIA: Primary | ICD-10-CM

## 2018-11-14 DIAGNOSIS — N40.1 BENIGN PROSTATIC HYPERPLASIA WITH URINARY FREQUENCY: ICD-10-CM

## 2018-11-14 DIAGNOSIS — R35.0 BENIGN PROSTATIC HYPERPLASIA WITH URINARY FREQUENCY: ICD-10-CM

## 2018-11-16 RX ORDER — TAMSULOSIN HYDROCHLORIDE 0.4 MG/1
CAPSULE ORAL
Qty: 90 CAPSULE | Refills: 0 | Status: SHIPPED | OUTPATIENT
Start: 2018-11-16 | End: 2019-04-29 | Stop reason: SDUPTHER

## 2018-11-16 RX ORDER — ATORVASTATIN CALCIUM 80 MG/1
TABLET, FILM COATED ORAL
Qty: 30 TABLET | Refills: 0 | Status: SHIPPED | OUTPATIENT
Start: 2018-11-16 | End: 2018-12-31 | Stop reason: SDUPTHER

## 2018-11-19 DIAGNOSIS — J44.9 CHRONIC OBSTRUCTIVE PULMONARY DISEASE, UNSPECIFIED COPD TYPE (HCC): ICD-10-CM

## 2018-11-20 DIAGNOSIS — I10 ESSENTIAL HYPERTENSION: ICD-10-CM

## 2018-11-20 RX ORDER — AMLODIPINE BESYLATE 2.5 MG/1
TABLET ORAL
Qty: 30 TABLET | Refills: 0 | Status: SHIPPED | OUTPATIENT
Start: 2018-11-20 | End: 2018-12-17 | Stop reason: SDUPTHER

## 2018-11-23 DIAGNOSIS — M62.838 MUSCLE SPASM: ICD-10-CM

## 2018-11-23 RX ORDER — CARISOPRODOL 350 MG/1
350 TABLET ORAL 4 TIMES DAILY
Qty: 80 TABLET | Refills: 0 | Status: SHIPPED | OUTPATIENT
Start: 2018-11-23 | End: 2018-12-26 | Stop reason: SDUPTHER

## 2018-11-26 ENCOUNTER — OFFICE VISIT (OUTPATIENT)
Dept: FAMILY MEDICINE CLINIC | Facility: CLINIC | Age: 73
End: 2018-11-26
Payer: MEDICARE

## 2018-11-26 VITALS
SYSTOLIC BLOOD PRESSURE: 140 MMHG | BODY MASS INDEX: 27.2 KG/M2 | OXYGEN SATURATION: 97 % | WEIGHT: 190 LBS | HEART RATE: 57 BPM | TEMPERATURE: 98.2 F | HEIGHT: 70 IN | DIASTOLIC BLOOD PRESSURE: 65 MMHG

## 2018-11-26 DIAGNOSIS — J41.1 MUCOPURULENT CHRONIC BRONCHITIS (HCC): ICD-10-CM

## 2018-11-26 DIAGNOSIS — E11.51 DM (DIABETES MELLITUS), TYPE 2 WITH PERIPHERAL VASCULAR COMPLICATIONS (HCC): Primary | ICD-10-CM

## 2018-11-26 DIAGNOSIS — G89.4 CHRONIC PAIN SYNDROME: ICD-10-CM

## 2018-11-26 PROCEDURE — 99214 OFFICE O/P EST MOD 30 MIN: CPT | Performed by: FAMILY MEDICINE

## 2018-11-26 RX ORDER — OXYCODONE HYDROCHLORIDE 30 MG/1
TABLET ORAL
Qty: 180 TABLET | Refills: 0 | Status: SHIPPED | OUTPATIENT
Start: 2018-11-26 | End: 2018-12-24 | Stop reason: SDUPTHER

## 2018-11-26 NOTE — PROGRESS NOTES
Subjective:   Chief Complaint   Patient presents with    Medication Management     whole body aches 9/10  Wants to discuss pain medicine increase  Need refills on Oxycodone  Patient ID: Erica Carrel  is a 68 y o  male  States using inhaler about 2-3 times a week  No recent illnesses  bms are not regular , having some problems with constipation   Left sided back pain about 2 weeks  Across hip into left testicles   Moving gets sharp pains  In low back some radiating pains into left leg   No urinary symptoms, no blood in his urine  History of kidney stones 15-20 years ago  Gets foot cramps in bilateral feet when sleeping  Muscle relaxer helps with foot pain  Patient is still smoking        The following portions of the patient's history were reviewed and updated as appropriate: allergies, current medications, past family history, past medical history, past social history, past surgical history and problem list     Review of Systems   Constitutional: Negative  Negative for fatigue and fever  HENT: Negative  Eyes: Negative  Respiratory: Negative  Negative for cough  Cardiovascular: Negative  Gastrointestinal: Negative  Endocrine: Negative  Genitourinary: Negative  Musculoskeletal: Positive for arthralgias, back pain, gait problem and myalgias  Muscle spasms in feet, nighttime   Skin: Negative  Allergic/Immunologic: Negative  Psychiatric/Behavioral: Negative  Objective:  Vitals:    11/26/18 1409 11/26/18 1435   BP: 142/90 140/65   Pulse: 57    Temp: 98 2 °F (36 8 °C)    SpO2: 97%    Weight: 86 2 kg (190 lb)    Height: 5' 9 5" (1 765 m)       Physical Exam   Constitutional: He is oriented to person, place, and time  He appears well-developed and well-nourished  HENT:   Head: Normocephalic and atraumatic  Cardiovascular: Normal rate, regular rhythm and normal heart sounds      Pulmonary/Chest: Effort normal and breath sounds normal    Abdominal: Soft  Bowel sounds are normal    Neurological: He is alert and oriented to person, place, and time  Skin: Skin is warm and dry  Psychiatric: He has a normal mood and affect  His behavior is normal  Judgment and thought content normal    Nursing note and vitals reviewed  Assessment/Plan:    No problem-specific Assessment & Plan notes found for this encounter  Diagnoses and all orders for this visit:    DM (diabetes mellitus), type 2 with peripheral vascular [facility] hemoglobin A1c on 06/15 was 6 1  Controlled    Chronic pain syndrome:  Patient requesting a refill on oxycodone     oxyCODONE (ROXICODONE) 30 MG immediate release tablet; 1 tab every 4 hours as needed for pain  Patient will look into getting CBD will for chronic pain    On chronic opioids    Mucopurulent chronic bronchitis (HCC) continue with inhaler as needed

## 2018-11-26 NOTE — PATIENT INSTRUCTIONS
Denise Sanders [facility] for regular use daily for constipation   Prednisone 10mg 4 tabs for 2 days, 3 tabs for 2 days, 2 tabs for 2 days, 1 tabs for 2 days     cbd oil evaluation, medical card first, evaluation,

## 2018-12-10 DIAGNOSIS — I10 ESSENTIAL HYPERTENSION: ICD-10-CM

## 2018-12-11 RX ORDER — ATENOLOL 25 MG/1
TABLET ORAL
Qty: 15 TABLET | Refills: 2 | Status: SHIPPED | OUTPATIENT
Start: 2018-12-11 | End: 2019-03-01 | Stop reason: SDUPTHER

## 2018-12-17 DIAGNOSIS — I10 ESSENTIAL HYPERTENSION: ICD-10-CM

## 2018-12-17 RX ORDER — AMLODIPINE BESYLATE 2.5 MG/1
TABLET ORAL
Qty: 30 TABLET | Refills: 0 | Status: SHIPPED | OUTPATIENT
Start: 2018-12-17 | End: 2018-12-20 | Stop reason: SDUPTHER

## 2018-12-20 DIAGNOSIS — I10 ESSENTIAL HYPERTENSION: ICD-10-CM

## 2018-12-20 RX ORDER — ENALAPRIL MALEATE 20 MG/1
20 TABLET ORAL DAILY
Qty: 30 TABLET | Refills: 2 | Status: SHIPPED | OUTPATIENT
Start: 2018-12-20 | End: 2019-07-14 | Stop reason: SDUPTHER

## 2018-12-20 RX ORDER — AMLODIPINE BESYLATE 2.5 MG/1
2.5 TABLET ORAL DAILY
Qty: 30 TABLET | Refills: 2 | Status: SHIPPED | OUTPATIENT
Start: 2018-12-20 | End: 2019-01-21 | Stop reason: SDUPTHER

## 2018-12-24 DIAGNOSIS — G89.4 CHRONIC PAIN SYNDROME: ICD-10-CM

## 2018-12-24 RX ORDER — OXYCODONE HYDROCHLORIDE 30 MG/1
TABLET ORAL
Qty: 180 TABLET | Refills: 0 | Status: SHIPPED | OUTPATIENT
Start: 2018-12-24 | End: 2019-01-21 | Stop reason: SDUPTHER

## 2018-12-26 DIAGNOSIS — M62.838 MUSCLE SPASM: ICD-10-CM

## 2018-12-27 RX ORDER — CARISOPRODOL 350 MG/1
350 TABLET ORAL 4 TIMES DAILY
Qty: 80 TABLET | Refills: 0 | Status: SHIPPED | OUTPATIENT
Start: 2018-12-27 | End: 2019-01-29 | Stop reason: SDUPTHER

## 2018-12-31 DIAGNOSIS — E78.49 OTHER HYPERLIPIDEMIA: ICD-10-CM

## 2018-12-31 RX ORDER — ATORVASTATIN CALCIUM 80 MG/1
80 TABLET, FILM COATED ORAL DAILY
Qty: 30 TABLET | Refills: 5 | Status: SHIPPED | OUTPATIENT
Start: 2018-12-31 | End: 2019-07-11 | Stop reason: SDUPTHER

## 2019-01-21 DIAGNOSIS — I10 ESSENTIAL HYPERTENSION: ICD-10-CM

## 2019-01-21 DIAGNOSIS — G89.4 CHRONIC PAIN SYNDROME: ICD-10-CM

## 2019-01-21 RX ORDER — AMLODIPINE BESYLATE 2.5 MG/1
2.5 TABLET ORAL DAILY
Qty: 30 TABLET | Refills: 2 | Status: SHIPPED | OUTPATIENT
Start: 2019-01-21 | End: 2019-05-02 | Stop reason: SDUPTHER

## 2019-01-21 RX ORDER — OXYCODONE HYDROCHLORIDE 30 MG/1
TABLET ORAL
Qty: 180 TABLET | Refills: 0 | Status: SHIPPED | OUTPATIENT
Start: 2019-01-21 | End: 2019-02-18 | Stop reason: SDUPTHER

## 2019-01-29 DIAGNOSIS — M62.838 MUSCLE SPASM: ICD-10-CM

## 2019-01-29 RX ORDER — CARISOPRODOL 350 MG/1
350 TABLET ORAL 4 TIMES DAILY
Qty: 80 TABLET | Refills: 0 | Status: SHIPPED | OUTPATIENT
Start: 2019-01-29 | End: 2019-03-04 | Stop reason: SDUPTHER

## 2019-02-18 DIAGNOSIS — G89.4 CHRONIC PAIN SYNDROME: ICD-10-CM

## 2019-02-18 DIAGNOSIS — L97.509 ULCER OF TOE, UNSPECIFIED LATERALITY, UNSPECIFIED ULCER STAGE (HCC): ICD-10-CM

## 2019-02-18 NOTE — TELEPHONE ENCOUNTER
Patient needing refills on his oxycodone and lidocaine gel to be sent to [facility]  Please advise

## 2019-02-19 RX ORDER — OXYCODONE HYDROCHLORIDE 30 MG/1
TABLET ORAL
Qty: 180 TABLET | Refills: 0 | Status: SHIPPED | OUTPATIENT
Start: 2019-02-19 | End: 2019-03-15 | Stop reason: SDUPTHER

## 2019-02-27 DIAGNOSIS — F41.9 ANXIETY: Primary | ICD-10-CM

## 2019-02-27 RX ORDER — LORAZEPAM 0.5 MG/1
0.5 TABLET ORAL EVERY 8 HOURS PRN
Qty: 90 TABLET | Refills: 0 | Status: SHIPPED | OUTPATIENT
Start: 2019-02-27 | End: 2019-12-06 | Stop reason: SDUPTHER

## 2019-03-01 DIAGNOSIS — I10 ESSENTIAL HYPERTENSION: ICD-10-CM

## 2019-03-01 RX ORDER — ATENOLOL 25 MG/1
TABLET ORAL
Qty: 15 TABLET | Refills: 2 | Status: SHIPPED | OUTPATIENT
Start: 2019-03-01 | End: 2019-05-31 | Stop reason: SDUPTHER

## 2019-03-04 DIAGNOSIS — J44.9 CHRONIC OBSTRUCTIVE PULMONARY DISEASE, UNSPECIFIED COPD TYPE (HCC): ICD-10-CM

## 2019-03-04 DIAGNOSIS — M62.838 MUSCLE SPASM: ICD-10-CM

## 2019-03-04 RX ORDER — CARISOPRODOL 350 MG/1
350 TABLET ORAL 4 TIMES DAILY
Qty: 80 TABLET | Refills: 0 | Status: SHIPPED | OUTPATIENT
Start: 2019-03-04 | End: 2019-04-04 | Stop reason: SDUPTHER

## 2019-03-15 DIAGNOSIS — G89.4 CHRONIC PAIN SYNDROME: ICD-10-CM

## 2019-03-15 RX ORDER — OXYCODONE HYDROCHLORIDE 30 MG/1
TABLET ORAL
Qty: 180 TABLET | Refills: 0 | Status: SHIPPED | OUTPATIENT
Start: 2019-03-15 | End: 2019-04-15 | Stop reason: SDUPTHER

## 2019-03-21 DIAGNOSIS — J30.1 SEASONAL ALLERGIC RHINITIS DUE TO POLLEN: Primary | ICD-10-CM

## 2019-03-21 DIAGNOSIS — J44.9 CHRONIC OBSTRUCTIVE PULMONARY DISEASE, UNSPECIFIED COPD TYPE (HCC): ICD-10-CM

## 2019-03-21 RX ORDER — FLUTICASONE PROPIONATE 50 MCG
2 SPRAY, SUSPENSION (ML) NASAL DAILY
Qty: 1 BOTTLE | Refills: 2 | Status: SHIPPED | OUTPATIENT
Start: 2019-03-21 | End: 2020-01-13 | Stop reason: SDUPTHER

## 2019-03-22 DIAGNOSIS — I10 ESSENTIAL HYPERTENSION: ICD-10-CM

## 2019-03-23 RX ORDER — FUROSEMIDE 40 MG/1
60 TABLET ORAL DAILY
Qty: 45 TABLET | Refills: 2 | Status: SHIPPED | OUTPATIENT
Start: 2019-03-23 | End: 2019-08-06 | Stop reason: SDUPTHER

## 2019-04-04 DIAGNOSIS — I73.9 PERIPHERAL VASCULAR DISEASE (HCC): ICD-10-CM

## 2019-04-04 DIAGNOSIS — I25.10 CORONARY ATHEROSCLEROSIS DUE TO CALCIFIED CORONARY LESION: ICD-10-CM

## 2019-04-04 DIAGNOSIS — I25.84 CORONARY ATHEROSCLEROSIS DUE TO CALCIFIED CORONARY LESION: ICD-10-CM

## 2019-04-04 DIAGNOSIS — I63.9 RIGHT HEMISPHERE, CEREBRAL INFARCTION (HCC): ICD-10-CM

## 2019-04-04 DIAGNOSIS — M62.838 MUSCLE SPASM: ICD-10-CM

## 2019-04-04 RX ORDER — CLOPIDOGREL BISULFATE 75 MG/1
75 TABLET ORAL DAILY
Qty: 30 TABLET | Refills: 5 | Status: SHIPPED | OUTPATIENT
Start: 2019-04-04 | End: 2019-08-27 | Stop reason: SDUPTHER

## 2019-04-04 RX ORDER — CARISOPRODOL 350 MG/1
350 TABLET ORAL 4 TIMES DAILY
Qty: 80 TABLET | Refills: 0 | Status: SHIPPED | OUTPATIENT
Start: 2019-04-04 | End: 2019-04-29 | Stop reason: SDUPTHER

## 2019-04-15 DIAGNOSIS — L97.509 ULCER OF TOE, UNSPECIFIED LATERALITY, UNSPECIFIED ULCER STAGE (HCC): ICD-10-CM

## 2019-04-15 DIAGNOSIS — G89.4 CHRONIC PAIN SYNDROME: ICD-10-CM

## 2019-04-15 RX ORDER — OXYCODONE HYDROCHLORIDE 30 MG/1
TABLET ORAL
Qty: 180 TABLET | Refills: 0 | Status: SHIPPED | OUTPATIENT
Start: 2019-04-15 | End: 2019-05-13 | Stop reason: SDUPTHER

## 2019-04-18 DIAGNOSIS — L97.509 ULCER OF TOE, UNSPECIFIED LATERALITY, UNSPECIFIED ULCER STAGE (HCC): ICD-10-CM

## 2019-04-29 DIAGNOSIS — M62.838 MUSCLE SPASM: ICD-10-CM

## 2019-04-29 DIAGNOSIS — J44.9 CHRONIC OBSTRUCTIVE PULMONARY DISEASE, UNSPECIFIED COPD TYPE (HCC): ICD-10-CM

## 2019-04-29 DIAGNOSIS — R35.0 BENIGN PROSTATIC HYPERPLASIA WITH URINARY FREQUENCY: ICD-10-CM

## 2019-04-29 DIAGNOSIS — N40.1 BENIGN PROSTATIC HYPERPLASIA WITH URINARY FREQUENCY: ICD-10-CM

## 2019-04-29 RX ORDER — CARISOPRODOL 350 MG/1
350 TABLET ORAL 4 TIMES DAILY
Qty: 80 TABLET | Refills: 0 | Status: SHIPPED | OUTPATIENT
Start: 2019-04-29 | End: 2019-05-25 | Stop reason: SDUPTHER

## 2019-04-29 RX ORDER — TAMSULOSIN HYDROCHLORIDE 0.4 MG/1
CAPSULE ORAL
Qty: 90 CAPSULE | Refills: 0 | Status: SHIPPED | OUTPATIENT
Start: 2019-04-29 | End: 2019-05-07 | Stop reason: SDUPTHER

## 2019-05-02 DIAGNOSIS — I10 ESSENTIAL HYPERTENSION: ICD-10-CM

## 2019-05-02 RX ORDER — AMLODIPINE BESYLATE 2.5 MG/1
2.5 TABLET ORAL DAILY
Qty: 30 TABLET | Refills: 0 | Status: SHIPPED | OUTPATIENT
Start: 2019-05-02 | End: 2019-05-31 | Stop reason: SDUPTHER

## 2019-05-03 ENCOUNTER — TELEPHONE (OUTPATIENT)
Dept: FAMILY MEDICINE CLINIC | Facility: CLINIC | Age: 74
End: 2019-05-03

## 2019-05-07 DIAGNOSIS — R35.0 BENIGN PROSTATIC HYPERPLASIA WITH URINARY FREQUENCY: ICD-10-CM

## 2019-05-07 DIAGNOSIS — N40.1 BENIGN PROSTATIC HYPERPLASIA WITH URINARY FREQUENCY: ICD-10-CM

## 2019-05-07 RX ORDER — TAMSULOSIN HYDROCHLORIDE 0.4 MG/1
0.4 CAPSULE ORAL
Qty: 90 CAPSULE | Refills: 0 | Status: SHIPPED | OUTPATIENT
Start: 2019-05-07 | End: 2019-08-06 | Stop reason: SDUPTHER

## 2019-05-13 DIAGNOSIS — G89.4 CHRONIC PAIN SYNDROME: ICD-10-CM

## 2019-05-13 PROCEDURE — 3066F NEPHROPATHY DOC TX: CPT | Performed by: FAMILY MEDICINE

## 2019-05-13 RX ORDER — OXYCODONE HYDROCHLORIDE 30 MG/1
TABLET ORAL
Qty: 180 TABLET | Refills: 0 | Status: SHIPPED | OUTPATIENT
Start: 2019-05-13 | End: 2019-06-07 | Stop reason: SDUPTHER

## 2019-05-13 RX ORDER — NITROGLYCERIN 80 MG/1
1 PATCH TRANSDERMAL DAILY
Qty: 30 PATCH | Refills: 0 | Status: SHIPPED | OUTPATIENT
Start: 2019-05-13 | End: 2019-07-22 | Stop reason: SDUPTHER

## 2019-05-25 DIAGNOSIS — J44.9 CHRONIC OBSTRUCTIVE PULMONARY DISEASE, UNSPECIFIED COPD TYPE (HCC): ICD-10-CM

## 2019-05-25 DIAGNOSIS — M62.838 MUSCLE SPASM: ICD-10-CM

## 2019-05-27 RX ORDER — CARISOPRODOL 350 MG/1
350 TABLET ORAL 4 TIMES DAILY
Qty: 80 TABLET | Refills: 0 | Status: SHIPPED | OUTPATIENT
Start: 2019-05-27 | End: 2019-06-18 | Stop reason: SDUPTHER

## 2019-05-31 DIAGNOSIS — I10 ESSENTIAL HYPERTENSION: ICD-10-CM

## 2019-05-31 RX ORDER — ATENOLOL 25 MG/1
TABLET ORAL
Qty: 15 TABLET | Refills: 0 | Status: SHIPPED | OUTPATIENT
Start: 2019-05-31 | End: 2019-06-28 | Stop reason: SDUPTHER

## 2019-05-31 RX ORDER — AMLODIPINE BESYLATE 2.5 MG/1
2.5 TABLET ORAL DAILY
Qty: 30 TABLET | Refills: 0 | Status: SHIPPED | OUTPATIENT
Start: 2019-05-31 | End: 2019-07-02 | Stop reason: SDUPTHER

## 2019-06-07 DIAGNOSIS — G89.4 CHRONIC PAIN SYNDROME: ICD-10-CM

## 2019-06-07 RX ORDER — OXYCODONE HYDROCHLORIDE 30 MG/1
TABLET ORAL
Qty: 180 TABLET | Refills: 0 | Status: SHIPPED | OUTPATIENT
Start: 2019-06-07 | End: 2019-07-05 | Stop reason: SDUPTHER

## 2019-06-18 ENCOUNTER — OFFICE VISIT (OUTPATIENT)
Dept: FAMILY MEDICINE CLINIC | Facility: CLINIC | Age: 74
End: 2019-06-18
Payer: MEDICARE

## 2019-06-18 VITALS
WEIGHT: 196.5 LBS | TEMPERATURE: 97.6 F | SYSTOLIC BLOOD PRESSURE: 130 MMHG | DIASTOLIC BLOOD PRESSURE: 70 MMHG | HEIGHT: 70 IN | HEART RATE: 55 BPM | BODY MASS INDEX: 28.13 KG/M2 | OXYGEN SATURATION: 97 %

## 2019-06-18 DIAGNOSIS — M62.838 MUSCLE SPASM: ICD-10-CM

## 2019-06-18 DIAGNOSIS — N40.1 BENIGN PROSTATIC HYPERPLASIA WITH URINARY FREQUENCY: ICD-10-CM

## 2019-06-18 DIAGNOSIS — E66.3 OVERWEIGHT (BMI 25.0-29.9): ICD-10-CM

## 2019-06-18 DIAGNOSIS — Z13.0 SCREENING FOR ENDOCRINE, METABOLIC AND IMMUNITY DISORDER: ICD-10-CM

## 2019-06-18 DIAGNOSIS — I74.9 ARTERIAL EMBOLISM (HCC): ICD-10-CM

## 2019-06-18 DIAGNOSIS — I10 ESSENTIAL HYPERTENSION: ICD-10-CM

## 2019-06-18 DIAGNOSIS — Z12.11 COLON CANCER SCREENING: ICD-10-CM

## 2019-06-18 DIAGNOSIS — Z13.29 SCREENING FOR ENDOCRINE, METABOLIC AND IMMUNITY DISORDER: ICD-10-CM

## 2019-06-18 DIAGNOSIS — Z13.228 SCREENING FOR ENDOCRINE, METABOLIC AND IMMUNITY DISORDER: ICD-10-CM

## 2019-06-18 DIAGNOSIS — I25.118 CORONARY ARTERY DISEASE OF NATIVE HEART WITH STABLE ANGINA PECTORIS, UNSPECIFIED VESSEL OR LESION TYPE (HCC): ICD-10-CM

## 2019-06-18 DIAGNOSIS — Z00.00 MEDICARE ANNUAL WELLNESS VISIT, SUBSEQUENT: Primary | ICD-10-CM

## 2019-06-18 DIAGNOSIS — E78.49 OTHER HYPERLIPIDEMIA: ICD-10-CM

## 2019-06-18 DIAGNOSIS — Z13.0 SCREENING FOR IRON DEFICIENCY ANEMIA: ICD-10-CM

## 2019-06-18 DIAGNOSIS — R35.0 BENIGN PROSTATIC HYPERPLASIA WITH URINARY FREQUENCY: ICD-10-CM

## 2019-06-18 DIAGNOSIS — Z13.29 SCREENING FOR THYROID DISORDER: ICD-10-CM

## 2019-06-18 DIAGNOSIS — E11.51 DM (DIABETES MELLITUS), TYPE 2 WITH PERIPHERAL VASCULAR COMPLICATIONS (HCC): ICD-10-CM

## 2019-06-18 PROBLEM — K42.9 UMBILICAL HERNIA WITHOUT OBSTRUCTION AND WITHOUT GANGRENE: Status: ACTIVE | Noted: 2019-06-18

## 2019-06-18 LAB — SL AMB POCT HEMOGLOBIN AIC: 6 (ref ?–6.5)

## 2019-06-18 PROCEDURE — 99213 OFFICE O/P EST LOW 20 MIN: CPT | Performed by: FAMILY MEDICINE

## 2019-06-18 PROCEDURE — G0439 PPPS, SUBSEQ VISIT: HCPCS | Performed by: FAMILY MEDICINE

## 2019-06-18 PROCEDURE — 83036 HEMOGLOBIN GLYCOSYLATED A1C: CPT | Performed by: FAMILY MEDICINE

## 2019-06-18 RX ORDER — CARISOPRODOL 350 MG/1
350 TABLET ORAL 4 TIMES DAILY
Qty: 120 TABLET | Refills: 0 | Status: SHIPPED | OUTPATIENT
Start: 2019-06-18 | End: 2019-07-22 | Stop reason: SDUPTHER

## 2019-06-20 LAB — MICROALBUMIN UR-MCNC: 8.5 MG/DL

## 2019-06-28 DIAGNOSIS — I10 ESSENTIAL HYPERTENSION: ICD-10-CM

## 2019-06-28 RX ORDER — ATENOLOL 25 MG/1
TABLET ORAL
Qty: 15 TABLET | Refills: 5 | Status: SHIPPED | OUTPATIENT
Start: 2019-06-28 | End: 2019-09-26 | Stop reason: SDUPTHER

## 2019-07-02 DIAGNOSIS — I10 ESSENTIAL HYPERTENSION: ICD-10-CM

## 2019-07-02 RX ORDER — AMLODIPINE BESYLATE 2.5 MG/1
2.5 TABLET ORAL DAILY
Qty: 30 TABLET | Refills: 0 | Status: SHIPPED | OUTPATIENT
Start: 2019-07-02 | End: 2019-07-29 | Stop reason: SDUPTHER

## 2019-07-05 DIAGNOSIS — G89.4 CHRONIC PAIN SYNDROME: ICD-10-CM

## 2019-07-05 RX ORDER — OXYCODONE HYDROCHLORIDE 30 MG/1
TABLET ORAL
Qty: 180 TABLET | Refills: 0 | Status: SHIPPED | OUTPATIENT
Start: 2019-07-05 | End: 2019-08-02 | Stop reason: SDUPTHER

## 2019-07-11 DIAGNOSIS — E78.49 OTHER HYPERLIPIDEMIA: ICD-10-CM

## 2019-07-11 RX ORDER — ATORVASTATIN CALCIUM 80 MG/1
80 TABLET, FILM COATED ORAL DAILY
Qty: 30 TABLET | Refills: 0 | Status: SHIPPED | OUTPATIENT
Start: 2019-07-11 | End: 2019-08-04 | Stop reason: SDUPTHER

## 2019-07-11 NOTE — TELEPHONE ENCOUNTER
Patient needing refills on his lipitor to be sent to [facility]  Please advise, thanks

## 2019-07-14 DIAGNOSIS — I10 ESSENTIAL HYPERTENSION: ICD-10-CM

## 2019-07-15 RX ORDER — ENALAPRIL MALEATE 20 MG/1
TABLET ORAL
Qty: 30 TABLET | Refills: 2 | Status: SHIPPED | OUTPATIENT
Start: 2019-07-15 | End: 2019-08-06 | Stop reason: SDUPTHER

## 2019-07-22 DIAGNOSIS — M62.838 MUSCLE SPASM: ICD-10-CM

## 2019-07-22 DIAGNOSIS — G89.4 CHRONIC PAIN SYNDROME: ICD-10-CM

## 2019-07-22 RX ORDER — CARISOPRODOL 350 MG/1
350 TABLET ORAL 4 TIMES DAILY
Qty: 120 TABLET | Refills: 0 | Status: SHIPPED | OUTPATIENT
Start: 2019-07-22 | End: 2019-08-20 | Stop reason: SDUPTHER

## 2019-07-22 RX ORDER — NITROGLYCERIN 80 MG/1
1 PATCH TRANSDERMAL DAILY
Qty: 30 PATCH | Refills: 0 | Status: SHIPPED | OUTPATIENT
Start: 2019-07-22 | End: 2019-08-14 | Stop reason: SDUPTHER

## 2019-07-29 DIAGNOSIS — I10 ESSENTIAL HYPERTENSION: ICD-10-CM

## 2019-07-30 RX ORDER — AMLODIPINE BESYLATE 2.5 MG/1
2.5 TABLET ORAL DAILY
Qty: 90 TABLET | Refills: 0 | Status: SHIPPED | OUTPATIENT
Start: 2019-07-30 | End: 2019-11-21 | Stop reason: SDUPTHER

## 2019-08-02 DIAGNOSIS — E11.42 TYPE 2 DIABETES MELLITUS WITH DIABETIC POLYNEUROPATHY, WITHOUT LONG-TERM CURRENT USE OF INSULIN (HCC): Primary | ICD-10-CM

## 2019-08-02 DIAGNOSIS — J44.9 CHRONIC OBSTRUCTIVE PULMONARY DISEASE, UNSPECIFIED COPD TYPE (HCC): ICD-10-CM

## 2019-08-02 DIAGNOSIS — G89.4 CHRONIC PAIN SYNDROME: ICD-10-CM

## 2019-08-02 RX ORDER — OXYCODONE HYDROCHLORIDE 30 MG/1
TABLET ORAL
Qty: 180 TABLET | Refills: 0 | Status: SHIPPED | OUTPATIENT
Start: 2019-08-02 | End: 2019-08-05 | Stop reason: SDUPTHER

## 2019-08-03 ENCOUNTER — TELEPHONE (OUTPATIENT)
Dept: OTHER | Facility: OTHER | Age: 74
End: 2019-08-03

## 2019-08-03 NOTE — TELEPHONE ENCOUNTER
PT 's wife called in regarding her husbands medication oxycodone 30 mg that the pharmacy does not have, and will not have for a week according to the PT's wife  PT's wife would like a call back and would like for the medication to be sent to another pharmacy

## 2019-08-04 DIAGNOSIS — E78.49 OTHER HYPERLIPIDEMIA: ICD-10-CM

## 2019-08-05 ENCOUNTER — TELEPHONE (OUTPATIENT)
Dept: FAMILY MEDICINE CLINIC | Facility: CLINIC | Age: 74
End: 2019-08-05

## 2019-08-05 DIAGNOSIS — G89.4 CHRONIC PAIN SYNDROME: ICD-10-CM

## 2019-08-05 RX ORDER — ATORVASTATIN CALCIUM 80 MG/1
TABLET, FILM COATED ORAL
Qty: 90 TABLET | Refills: 1 | Status: SHIPPED | OUTPATIENT
Start: 2019-08-05 | End: 2019-08-12 | Stop reason: SDUPTHER

## 2019-08-05 RX ORDER — OXYCODONE HYDROCHLORIDE 30 MG/1
TABLET ORAL
Qty: 180 TABLET | Refills: 0 | Status: SHIPPED | OUTPATIENT
Start: 2019-08-05 | End: 2019-08-30 | Stop reason: SDUPTHER

## 2019-08-05 NOTE — TELEPHONE ENCOUNTER
Tried calling patient's wife to advise them this was done  The home number listed on file belongs to a , and the other number listed ([phone]) does not ring when called  I have tried calling multiple times and could not get through

## 2019-08-05 NOTE — TELEPHONE ENCOUNTER
Patient needing for you to resend the oxycodone to [facility] in [place]  [facility]'t [facility] it  Please advise, thanks

## 2019-08-05 NOTE — TELEPHONE ENCOUNTER
Yes same phone numbers are listed  I keep trying to call the line that doesn't ring to see if theres anyway I can get through, but no luck so far

## 2019-08-06 ENCOUNTER — DOCUMENTATION (OUTPATIENT)
Dept: FAMILY MEDICINE CLINIC | Facility: CLINIC | Age: 74
End: 2019-08-06

## 2019-08-06 DIAGNOSIS — N40.1 BENIGN PROSTATIC HYPERPLASIA WITH URINARY FREQUENCY: ICD-10-CM

## 2019-08-06 DIAGNOSIS — I10 ESSENTIAL HYPERTENSION: ICD-10-CM

## 2019-08-06 DIAGNOSIS — R35.0 BENIGN PROSTATIC HYPERPLASIA WITH URINARY FREQUENCY: ICD-10-CM

## 2019-08-06 RX ORDER — FUROSEMIDE 40 MG/1
60 TABLET ORAL DAILY
Qty: 45 TABLET | Refills: 2 | Status: SHIPPED | OUTPATIENT
Start: 2019-08-06 | End: 2020-01-02

## 2019-08-06 RX ORDER — TAMSULOSIN HYDROCHLORIDE 0.4 MG/1
0.4 CAPSULE ORAL
Qty: 90 CAPSULE | Refills: 0 | Status: SHIPPED | OUTPATIENT
Start: 2019-08-06 | End: 2019-11-12 | Stop reason: SDUPTHER

## 2019-08-06 RX ORDER — ENALAPRIL MALEATE 20 MG/1
TABLET ORAL
Qty: 90 TABLET | Refills: 2 | Status: SHIPPED | OUTPATIENT
Start: 2019-08-06 | End: 2020-07-16 | Stop reason: SDUPTHER

## 2019-08-06 NOTE — PROGRESS NOTES
It is used for blood pressure control and helps to preserve kidney function when the sugars are high

## 2019-08-06 NOTE — TELEPHONE ENCOUNTER
Patient needing refills on his flomax and lasix to be sent to [facility]  Please advise

## 2019-08-07 DIAGNOSIS — E11.42 TYPE 2 DIABETES MELLITUS WITH DIABETIC POLYNEUROPATHY, WITHOUT LONG-TERM CURRENT USE OF INSULIN (HCC): ICD-10-CM

## 2019-08-07 NOTE — TELEPHONE ENCOUNTER
Pharmacy called stating [facility] [employer] will not cover Freestyle test strips and to call pt to see what meter he uses, [address], tried to call pt both numbers were incorrect for pt, sent card to pt's home for pt to phone into office

## 2019-08-12 DIAGNOSIS — E78.49 OTHER HYPERLIPIDEMIA: ICD-10-CM

## 2019-08-12 RX ORDER — ATORVASTATIN CALCIUM 80 MG/1
80 TABLET, FILM COATED ORAL DAILY
Qty: 90 TABLET | Refills: 0 | Status: SHIPPED | OUTPATIENT
Start: 2019-08-12 | End: 2019-11-12 | Stop reason: SDUPTHER

## 2019-08-12 NOTE — PROGRESS NOTES
COURTNEY, I've tried a few times now to get this patient's wife to provide me with a correct phone number and she always says that she doesn't know her own number and refuses to go find out what it is  Should we send her a card to have her call with the updated number? Thanks

## 2019-08-14 DIAGNOSIS — G89.4 CHRONIC PAIN SYNDROME: ICD-10-CM

## 2019-08-14 RX ORDER — NITROGLYCERIN 80 MG/1
PATCH TRANSDERMAL
Qty: 30 PATCH | Refills: 0 | Status: SHIPPED | OUTPATIENT
Start: 2019-08-14 | End: 2019-08-21 | Stop reason: SDUPTHER

## 2019-08-16 ENCOUNTER — TELEPHONE (OUTPATIENT)
Dept: FAMILY MEDICINE CLINIC | Facility: CLINIC | Age: 74
End: 2019-08-16

## 2019-08-16 DIAGNOSIS — J44.9 CHRONIC OBSTRUCTIVE PULMONARY DISEASE, UNSPECIFIED COPD TYPE (HCC): ICD-10-CM

## 2019-08-16 RX ORDER — BUDESONIDE AND FORMOTEROL FUMARATE DIHYDRATE 160; 4.5 UG/1; UG/1
2 AEROSOL RESPIRATORY (INHALATION) 2 TIMES DAILY
Qty: 1 INHALER | Refills: 5 | Status: SHIPPED | OUTPATIENT
Start: 2019-08-16 | End: 2021-06-03 | Stop reason: SDUPTHER

## 2019-08-20 DIAGNOSIS — M62.838 MUSCLE SPASM: ICD-10-CM

## 2019-08-20 DIAGNOSIS — L97.509 ULCER OF TOE, UNSPECIFIED LATERALITY, UNSPECIFIED ULCER STAGE (HCC): ICD-10-CM

## 2019-08-20 RX ORDER — CARISOPRODOL 350 MG/1
350 TABLET ORAL 4 TIMES DAILY
Qty: 120 TABLET | Refills: 0 | Status: CANCELLED | OUTPATIENT
Start: 2019-08-20

## 2019-08-20 RX ORDER — CARISOPRODOL 350 MG/1
350 TABLET ORAL 4 TIMES DAILY
Qty: 120 TABLET | Refills: 0 | Status: SHIPPED | OUTPATIENT
Start: 2019-08-20 | End: 2019-09-16 | Stop reason: SDUPTHER

## 2019-08-21 DIAGNOSIS — G89.4 CHRONIC PAIN SYNDROME: ICD-10-CM

## 2019-08-21 RX ORDER — NITROGLYCERIN 80 MG/1
1 PATCH TRANSDERMAL DAILY
Qty: 30 PATCH | Refills: 2 | Status: SHIPPED | OUTPATIENT
Start: 2019-08-21 | End: 2020-02-10 | Stop reason: SDUPTHER

## 2019-08-27 DIAGNOSIS — I63.9 RIGHT HEMISPHERE, CEREBRAL INFARCTION (HCC): ICD-10-CM

## 2019-08-27 DIAGNOSIS — I25.10 CORONARY ATHEROSCLEROSIS DUE TO CALCIFIED CORONARY LESION: ICD-10-CM

## 2019-08-27 DIAGNOSIS — I25.84 CORONARY ATHEROSCLEROSIS DUE TO CALCIFIED CORONARY LESION: ICD-10-CM

## 2019-08-27 DIAGNOSIS — I73.9 PERIPHERAL VASCULAR DISEASE (HCC): ICD-10-CM

## 2019-08-28 RX ORDER — CLOPIDOGREL BISULFATE 75 MG/1
TABLET ORAL
Qty: 30 TABLET | Refills: 5 | Status: SHIPPED | OUTPATIENT
Start: 2019-08-28 | End: 2020-03-06 | Stop reason: SDUPTHER

## 2019-08-30 DIAGNOSIS — J44.9 CHRONIC OBSTRUCTIVE PULMONARY DISEASE, UNSPECIFIED COPD TYPE (HCC): ICD-10-CM

## 2019-08-30 DIAGNOSIS — G89.4 CHRONIC PAIN SYNDROME: ICD-10-CM

## 2019-08-30 RX ORDER — OXYCODONE HYDROCHLORIDE 30 MG/1
TABLET ORAL
Qty: 180 TABLET | Refills: 0 | Status: SHIPPED | OUTPATIENT
Start: 2019-08-30 | End: 2019-09-24 | Stop reason: SDUPTHER

## 2019-09-08 DIAGNOSIS — G89.4 CHRONIC PAIN SYNDROME: ICD-10-CM

## 2019-09-08 RX ORDER — NITROGLYCERIN 80 MG/1
PATCH TRANSDERMAL
Qty: 30 PATCH | Refills: 2 | Status: SHIPPED | OUTPATIENT
Start: 2019-09-08 | End: 2020-02-10 | Stop reason: SDUPTHER

## 2019-09-16 DIAGNOSIS — M62.838 MUSCLE SPASM: ICD-10-CM

## 2019-09-16 RX ORDER — CARISOPRODOL 350 MG/1
350 TABLET ORAL 4 TIMES DAILY
Qty: 120 TABLET | Refills: 0 | Status: SHIPPED | OUTPATIENT
Start: 2019-09-16 | End: 2019-10-15 | Stop reason: SDUPTHER

## 2019-09-20 DIAGNOSIS — G89.4 CHRONIC PAIN SYNDROME: ICD-10-CM

## 2019-09-20 RX ORDER — OXYCODONE HYDROCHLORIDE 30 MG/1
TABLET ORAL
Qty: 180 TABLET | Refills: 0 | Status: CANCELLED | OUTPATIENT
Start: 2019-09-20

## 2019-09-23 ENCOUNTER — TELEPHONE (OUTPATIENT)
Dept: FAMILY MEDICINE CLINIC | Facility: CLINIC | Age: 74
End: 2019-09-23

## 2019-09-23 NOTE — TELEPHONE ENCOUNTER
No refills were sent in for patient  [facility] and is too soon for refill  Dr Wanda Stoddard will not fill early  Left message on machine for patient

## 2019-09-23 NOTE — TELEPHONE ENCOUNTER
I have last fill as 8/28 which would be due on 9/25  I will fill 12 pills until Wednesday when next rx is due   Please check with pharmacist

## 2019-09-23 NOTE — TELEPHONE ENCOUNTER
Left message on patient's phone that it is too soon to fill his oxy, [facility] states [facility] and has maxed out on his medication  If they have any questions they may contact us in the morning

## 2019-09-23 NOTE — TELEPHONE ENCOUNTER
Daxa Schofield called, he took extra of his pain pills and wants to fill it early, pharmacy won't due to the earliness  He wants to know if you can change the prescription so he can pick it up early

## 2019-09-23 NOTE — TELEPHONE ENCOUNTER
[facility] phoned  Patient requested to have his oxy filled early  Last filled 8/31  He is on the 24th day and should have 36 tablets left  Which would mean a refill due Friday or Saturday  They cannot refill anything without your approval and a reason why it is being filled early

## 2019-09-24 DIAGNOSIS — G89.4 CHRONIC PAIN SYNDROME: ICD-10-CM

## 2019-09-24 RX ORDER — OXYCODONE HYDROCHLORIDE 30 MG/1
TABLET ORAL
Qty: 20 TABLET | Refills: 0 | Status: SHIPPED | OUTPATIENT
Start: 2019-09-24 | End: 2019-10-23 | Stop reason: SDUPTHER

## 2019-09-24 NOTE — TELEPHONE ENCOUNTER
Patient's wife called stating that if we call and override the script they can do the early fill for the 12 tabs  I advised her that we did call last night and the pharmacist was not agreeable to filling the script early because its not safe for the patient  She is calling the pharmacy again now to see what they say  Please advise

## 2019-09-24 NOTE — TELEPHONE ENCOUNTER
Patient's wife said she spoke to the pharmacist again this morning, who told her that we never called and that if we do call they will do the early fill   Please advise

## 2019-09-24 NOTE — TELEPHONE ENCOUNTER
We already called pharmacy to make that suggestion  States he filled rx on 8/31 so too early to fill next rx  Would not fill the 12 tabs for the next 2 days- because he actually needs 4 days   If he picked up rx on 8/31 instead of 8/28 when it was filled, then next rx not due until Saturday 9/28 and 12 tabs are not enough to last until then since it is 4 more days

## 2019-09-26 DIAGNOSIS — I10 ESSENTIAL HYPERTENSION: ICD-10-CM

## 2019-09-27 RX ORDER — ATENOLOL 25 MG/1
TABLET ORAL
Qty: 15 TABLET | Refills: 5 | Status: SHIPPED | OUTPATIENT
Start: 2019-09-27 | End: 2020-05-08 | Stop reason: SDUPTHER

## 2019-10-11 DIAGNOSIS — Z71.89 COMPLEX CARE COORDINATION: Primary | ICD-10-CM

## 2019-10-14 ENCOUNTER — PATIENT OUTREACH (OUTPATIENT)
Dept: FAMILY MEDICINE CLINIC | Facility: CLINIC | Age: 74
End: 2019-10-14

## 2019-10-15 DIAGNOSIS — M62.838 MUSCLE SPASM: ICD-10-CM

## 2019-10-15 RX ORDER — CARISOPRODOL 350 MG/1
350 TABLET ORAL 4 TIMES DAILY
Qty: 120 TABLET | Refills: 0 | Status: SHIPPED | OUTPATIENT
Start: 2019-10-15 | End: 2019-11-12 | Stop reason: SDUPTHER

## 2019-10-15 NOTE — TELEPHONE ENCOUNTER
Patient needs refills on the soma to be sent to [facility]   Has bw scheduled for 10/29

## 2019-10-17 DIAGNOSIS — J44.9 CHRONIC OBSTRUCTIVE PULMONARY DISEASE, UNSPECIFIED COPD TYPE (HCC): ICD-10-CM

## 2019-10-23 DIAGNOSIS — G89.4 CHRONIC PAIN SYNDROME: ICD-10-CM

## 2019-10-23 RX ORDER — OXYCODONE HYDROCHLORIDE 30 MG/1
TABLET ORAL
Qty: 180 TABLET | Refills: 0 | Status: SHIPPED | OUTPATIENT
Start: 2019-10-23 | End: 2019-11-20 | Stop reason: SDUPTHER

## 2019-10-29 ENCOUNTER — CLINICAL SUPPORT (OUTPATIENT)
Dept: FAMILY MEDICINE CLINIC | Facility: CLINIC | Age: 74
End: 2019-10-29
Payer: MEDICARE

## 2019-10-29 DIAGNOSIS — Z13.29 SCREENING FOR ENDOCRINE, METABOLIC AND IMMUNITY DISORDER: ICD-10-CM

## 2019-10-29 DIAGNOSIS — I25.118 CORONARY ARTERY DISEASE OF NATIVE HEART WITH STABLE ANGINA PECTORIS, UNSPECIFIED VESSEL OR LESION TYPE (HCC): ICD-10-CM

## 2019-10-29 DIAGNOSIS — R35.0 BENIGN PROSTATIC HYPERPLASIA WITH URINARY FREQUENCY: ICD-10-CM

## 2019-10-29 DIAGNOSIS — Z13.29 SCREENING FOR THYROID DISORDER: ICD-10-CM

## 2019-10-29 DIAGNOSIS — N40.1 BENIGN PROSTATIC HYPERPLASIA WITH URINARY FREQUENCY: ICD-10-CM

## 2019-10-29 DIAGNOSIS — Z13.228 SCREENING FOR ENDOCRINE, METABOLIC AND IMMUNITY DISORDER: ICD-10-CM

## 2019-10-29 DIAGNOSIS — Z13.0 SCREENING FOR ENDOCRINE, METABOLIC AND IMMUNITY DISORDER: ICD-10-CM

## 2019-10-29 DIAGNOSIS — E78.49 OTHER HYPERLIPIDEMIA: ICD-10-CM

## 2019-10-29 DIAGNOSIS — Z13.0 SCREENING FOR IRON DEFICIENCY ANEMIA: ICD-10-CM

## 2019-10-29 DIAGNOSIS — I10 ESSENTIAL HYPERTENSION: Primary | ICD-10-CM

## 2019-10-29 DIAGNOSIS — E11.42 TYPE 2 DIABETES MELLITUS WITH DIABETIC POLYNEUROPATHY, WITHOUT LONG-TERM CURRENT USE OF INSULIN (HCC): ICD-10-CM

## 2019-10-29 LAB
ALBUMIN SERPL BCP-MCNC: 3.6 G/DL (ref 3.5–5)
ALP SERPL-CCNC: 74 U/L (ref 46–116)
ALT SERPL W P-5'-P-CCNC: 10 U/L (ref 12–78)
ANION GAP SERPL CALCULATED.3IONS-SCNC: 9 MMOL/L (ref 4–13)
AST SERPL W P-5'-P-CCNC: 21 U/L (ref 5–45)
BASOPHILS # BLD AUTO: 0.08 THOUSANDS/ΜL (ref 0–0.1)
BASOPHILS NFR BLD AUTO: 1 % (ref 0–1)
BILIRUB SERPL-MCNC: 0.42 MG/DL (ref 0.2–1)
BUN SERPL-MCNC: 25 MG/DL (ref 5–25)
CALCIUM SERPL-MCNC: 8.6 MG/DL (ref 8.3–10.1)
CHLORIDE SERPL-SCNC: 111 MMOL/L (ref 100–108)
CHOLEST SERPL-MCNC: 106 MG/DL (ref 50–200)
CO2 SERPL-SCNC: 20 MMOL/L (ref 21–32)
CREAT SERPL-MCNC: 1.98 MG/DL (ref 0.6–1.3)
EOSINOPHIL # BLD AUTO: 0.32 THOUSAND/ΜL (ref 0–0.61)
EOSINOPHIL NFR BLD AUTO: 3 % (ref 0–6)
ERYTHROCYTE [DISTWIDTH] IN BLOOD BY AUTOMATED COUNT: 14 % (ref 11.6–15.1)
GFR SERPL CREATININE-BSD FRML MDRD: 33 ML/MIN/1.73SQ M
GLUCOSE P FAST SERPL-MCNC: 81 MG/DL (ref 65–99)
HCT VFR BLD AUTO: 32.3 % (ref 36.5–49.3)
HDLC SERPL-MCNC: 36 MG/DL
HGB BLD-MCNC: 9.8 G/DL (ref 12–17)
IMM GRANULOCYTES # BLD AUTO: 0.03 THOUSAND/UL (ref 0–0.2)
IMM GRANULOCYTES NFR BLD AUTO: 0 % (ref 0–2)
LDLC SERPL CALC-MCNC: 50 MG/DL (ref 0–100)
LYMPHOCYTES # BLD AUTO: 1.97 THOUSANDS/ΜL (ref 0.6–4.47)
LYMPHOCYTES NFR BLD AUTO: 19 % (ref 14–44)
MCH RBC QN AUTO: 31 PG (ref 26.8–34.3)
MCHC RBC AUTO-ENTMCNC: 30.3 G/DL (ref 31.4–37.4)
MCV RBC AUTO: 102 FL (ref 82–98)
MONOCYTES # BLD AUTO: 0.91 THOUSAND/ΜL (ref 0.17–1.22)
MONOCYTES NFR BLD AUTO: 9 % (ref 4–12)
NEUTROPHILS # BLD AUTO: 7.28 THOUSANDS/ΜL (ref 1.85–7.62)
NEUTS SEG NFR BLD AUTO: 68 % (ref 43–75)
NRBC BLD AUTO-RTO: 0 /100 WBCS
PLATELET # BLD AUTO: 214 THOUSANDS/UL (ref 149–390)
PMV BLD AUTO: 10.3 FL (ref 8.9–12.7)
POTASSIUM SERPL-SCNC: 4.6 MMOL/L (ref 3.5–5.3)
PROT SERPL-MCNC: 6.9 G/DL (ref 6.4–8.2)
RBC # BLD AUTO: 3.16 MILLION/UL (ref 3.88–5.62)
SODIUM SERPL-SCNC: 140 MMOL/L (ref 136–145)
TRIGL SERPL-MCNC: 100 MG/DL
TSH SERPL DL<=0.05 MIU/L-ACNC: 1.63 UIU/ML (ref 0.36–3.74)
WBC # BLD AUTO: 10.59 THOUSAND/UL (ref 4.31–10.16)

## 2019-10-29 PROCEDURE — 84154 ASSAY OF PSA FREE: CPT | Performed by: FAMILY MEDICINE

## 2019-10-29 PROCEDURE — 80061 LIPID PANEL: CPT | Performed by: FAMILY MEDICINE

## 2019-10-29 PROCEDURE — 84443 ASSAY THYROID STIM HORMONE: CPT | Performed by: FAMILY MEDICINE

## 2019-10-29 PROCEDURE — 80053 COMPREHEN METABOLIC PANEL: CPT | Performed by: FAMILY MEDICINE

## 2019-10-29 PROCEDURE — 36415 COLL VENOUS BLD VENIPUNCTURE: CPT

## 2019-10-29 PROCEDURE — 85025 COMPLETE CBC W/AUTO DIFF WBC: CPT | Performed by: FAMILY MEDICINE

## 2019-10-29 PROCEDURE — 84153 ASSAY OF PSA TOTAL: CPT | Performed by: FAMILY MEDICINE

## 2019-10-31 LAB
PSA FREE MFR SERPL: 42.5 %
PSA FREE SERPL-MCNC: 0.17 NG/ML
PSA SERPL-MCNC: 0.4 NG/ML (ref 0–4)

## 2019-11-04 DIAGNOSIS — L97.509 ULCER OF TOE, UNSPECIFIED LATERALITY, UNSPECIFIED ULCER STAGE (HCC): ICD-10-CM

## 2019-11-12 ENCOUNTER — PATIENT OUTREACH (OUTPATIENT)
Dept: FAMILY MEDICINE CLINIC | Facility: CLINIC | Age: 74
End: 2019-11-12

## 2019-11-12 DIAGNOSIS — R35.0 BENIGN PROSTATIC HYPERPLASIA WITH URINARY FREQUENCY: ICD-10-CM

## 2019-11-12 DIAGNOSIS — M62.838 MUSCLE SPASM: ICD-10-CM

## 2019-11-12 DIAGNOSIS — E78.49 OTHER HYPERLIPIDEMIA: ICD-10-CM

## 2019-11-12 DIAGNOSIS — N40.1 BENIGN PROSTATIC HYPERPLASIA WITH URINARY FREQUENCY: ICD-10-CM

## 2019-11-13 RX ORDER — ATORVASTATIN CALCIUM 80 MG/1
80 TABLET, FILM COATED ORAL DAILY
Qty: 90 TABLET | Refills: 0 | Status: SHIPPED | OUTPATIENT
Start: 2019-11-13 | End: 2020-02-28 | Stop reason: SDUPTHER

## 2019-11-13 RX ORDER — CARISOPRODOL 350 MG/1
350 TABLET ORAL 4 TIMES DAILY
Qty: 120 TABLET | Refills: 0 | Status: SHIPPED | OUTPATIENT
Start: 2019-11-13 | End: 2019-12-13 | Stop reason: SDUPTHER

## 2019-11-13 RX ORDER — TAMSULOSIN HYDROCHLORIDE 0.4 MG/1
0.4 CAPSULE ORAL
Qty: 90 CAPSULE | Refills: 0 | Status: SHIPPED | OUTPATIENT
Start: 2019-11-13 | End: 2020-02-28 | Stop reason: SDUPTHER

## 2019-11-14 ENCOUNTER — TELEPHONE (OUTPATIENT)
Dept: FAMILY MEDICINE CLINIC | Facility: CLINIC | Age: 74
End: 2019-11-14

## 2019-11-20 DIAGNOSIS — G89.4 CHRONIC PAIN SYNDROME: ICD-10-CM

## 2019-11-20 RX ORDER — OXYCODONE HYDROCHLORIDE 30 MG/1
TABLET ORAL
Qty: 180 TABLET | Refills: 0 | Status: SHIPPED | OUTPATIENT
Start: 2019-11-20 | End: 2019-12-17 | Stop reason: SDUPTHER

## 2019-11-21 DIAGNOSIS — I10 ESSENTIAL HYPERTENSION: ICD-10-CM

## 2019-11-22 RX ORDER — AMLODIPINE BESYLATE 2.5 MG/1
TABLET ORAL
Qty: 90 TABLET | Refills: 0 | Status: SHIPPED | OUTPATIENT
Start: 2019-11-22 | End: 2020-04-20 | Stop reason: SDUPTHER

## 2019-11-25 ENCOUNTER — TELEPHONE (OUTPATIENT)
Dept: FAMILY MEDICINE CLINIC | Facility: CLINIC | Age: 74
End: 2019-11-25

## 2019-11-25 NOTE — TELEPHONE ENCOUNTER
She wanted you be aware that he was release from the hospital on Friday  She asked me to call him to make appt for him to see you  I reached out and left message to make appt

## 2019-12-06 ENCOUNTER — TELEPHONE (OUTPATIENT)
Dept: FAMILY MEDICINE CLINIC | Facility: CLINIC | Age: 74
End: 2019-12-06

## 2019-12-06 DIAGNOSIS — F41.9 ANXIETY: ICD-10-CM

## 2019-12-06 RX ORDER — LORAZEPAM 0.5 MG/1
0.5 TABLET ORAL EVERY 8 HOURS PRN
Qty: 90 TABLET | Refills: 0 | Status: SHIPPED | OUTPATIENT
Start: 2019-12-06 | End: 2020-03-06 | Stop reason: SDUPTHER

## 2019-12-13 DIAGNOSIS — M62.838 MUSCLE SPASM: ICD-10-CM

## 2019-12-13 RX ORDER — CARISOPRODOL 350 MG/1
350 TABLET ORAL 4 TIMES DAILY
Qty: 120 TABLET | Refills: 0 | Status: SHIPPED | OUTPATIENT
Start: 2019-12-13 | End: 2019-12-16 | Stop reason: SDUPTHER

## 2019-12-13 NOTE — TELEPHONE ENCOUNTER
Patient needs an appointment, [facility] keeps saying they cant' come in  He is in the in basket for an appointment

## 2019-12-16 DIAGNOSIS — G89.4 CHRONIC PAIN SYNDROME: ICD-10-CM

## 2019-12-16 DIAGNOSIS — M62.838 MUSCLE SPASM: ICD-10-CM

## 2019-12-16 RX ORDER — OXYCODONE HYDROCHLORIDE 30 MG/1
TABLET ORAL
Qty: 180 TABLET | Refills: 0 | OUTPATIENT
Start: 2019-12-16

## 2019-12-16 RX ORDER — CARISOPRODOL 350 MG/1
350 TABLET ORAL 4 TIMES DAILY
Qty: 120 TABLET | Refills: 0 | Status: SHIPPED | OUTPATIENT
Start: 2019-12-16 | End: 2020-01-13 | Stop reason: SDUPTHER

## 2019-12-16 RX ORDER — CARISOPRODOL 350 MG/1
350 TABLET ORAL 4 TIMES DAILY
Qty: 120 TABLET | Refills: 0 | OUTPATIENT
Start: 2019-12-16

## 2019-12-17 DIAGNOSIS — G89.4 CHRONIC PAIN SYNDROME: ICD-10-CM

## 2019-12-17 RX ORDER — OXYCODONE HYDROCHLORIDE 30 MG/1
TABLET ORAL
Qty: 180 TABLET | Refills: 0 | Status: SHIPPED | OUTPATIENT
Start: 2019-12-17 | End: 2020-01-13 | Stop reason: SDUPTHER

## 2020-01-02 DIAGNOSIS — I10 ESSENTIAL HYPERTENSION: ICD-10-CM

## 2020-01-02 RX ORDER — FUROSEMIDE 40 MG/1
60 TABLET ORAL DAILY
Qty: 135 TABLET | Refills: 0 | Status: SHIPPED | OUTPATIENT
Start: 2020-01-02 | End: 2020-03-25

## 2020-01-13 DIAGNOSIS — J30.1 SEASONAL ALLERGIC RHINITIS DUE TO POLLEN: ICD-10-CM

## 2020-01-13 DIAGNOSIS — M62.838 MUSCLE SPASM: ICD-10-CM

## 2020-01-13 DIAGNOSIS — G89.4 CHRONIC PAIN SYNDROME: ICD-10-CM

## 2020-01-13 RX ORDER — FLUTICASONE PROPIONATE 50 MCG
2 SPRAY, SUSPENSION (ML) NASAL DAILY
Qty: 1 BOTTLE | Refills: 2 | Status: SHIPPED | OUTPATIENT
Start: 2020-01-13 | End: 2020-04-08

## 2020-01-13 RX ORDER — OXYCODONE HYDROCHLORIDE 30 MG/1
TABLET ORAL
Qty: 180 TABLET | Refills: 0 | Status: SHIPPED | OUTPATIENT
Start: 2020-01-13 | End: 2020-02-10 | Stop reason: SDUPTHER

## 2020-01-13 RX ORDER — CARISOPRODOL 350 MG/1
350 TABLET ORAL 4 TIMES DAILY
Qty: 120 TABLET | Refills: 0 | Status: SHIPPED | OUTPATIENT
Start: 2020-01-13 | End: 2020-02-12 | Stop reason: SDUPTHER

## 2020-01-16 ENCOUNTER — TELEPHONE (OUTPATIENT)
Dept: FAMILY MEDICINE CLINIC | Facility: CLINIC | Age: 75
End: 2020-01-16

## 2020-01-16 NOTE — TELEPHONE ENCOUNTER
Pt has not been seen since 6/19  No showed for his last appt  He needs to schedule appt before he gets anymore refills

## 2020-01-18 DIAGNOSIS — L97.509 ULCER OF TOE, UNSPECIFIED LATERALITY, UNSPECIFIED ULCER STAGE (HCC): ICD-10-CM

## 2020-02-10 DIAGNOSIS — G89.4 CHRONIC PAIN SYNDROME: ICD-10-CM

## 2020-02-10 DIAGNOSIS — L97.509 ULCER OF TOE, UNSPECIFIED LATERALITY, UNSPECIFIED ULCER STAGE (HCC): ICD-10-CM

## 2020-02-10 RX ORDER — NITROGLYCERIN 80 MG/1
1 PATCH TRANSDERMAL DAILY
Qty: 30 PATCH | Refills: 2 | Status: SHIPPED | OUTPATIENT
Start: 2020-02-10 | End: 2020-08-05 | Stop reason: SDUPTHER

## 2020-02-10 RX ORDER — OXYCODONE HYDROCHLORIDE 30 MG/1
TABLET ORAL
Qty: 180 TABLET | Refills: 0 | Status: SHIPPED | OUTPATIENT
Start: 2020-02-10 | End: 2020-03-06 | Stop reason: SDUPTHER

## 2020-02-10 NOTE — TELEPHONE ENCOUNTER
Pt has not been seen since 6/2019,   Sheri Muller told him that he needed ov before he has next fill  Please have him schedule ov if he wants filled

## 2020-02-11 RX ORDER — LIDOCAINE HYDROCHLORIDE 20 MG/ML
JELLY TOPICAL
COMMUNITY
Start: 2020-01-18 | End: 2020-03-12 | Stop reason: ALTCHOICE

## 2020-02-12 ENCOUNTER — OFFICE VISIT (OUTPATIENT)
Dept: FAMILY MEDICINE CLINIC | Facility: CLINIC | Age: 75
End: 2020-02-12
Payer: MEDICARE

## 2020-02-12 VITALS
TEMPERATURE: 97.7 F | DIASTOLIC BLOOD PRESSURE: 68 MMHG | BODY MASS INDEX: 28.24 KG/M2 | WEIGHT: 197.25 LBS | HEIGHT: 70 IN | HEART RATE: 57 BPM | OXYGEN SATURATION: 97 % | SYSTOLIC BLOOD PRESSURE: 116 MMHG

## 2020-02-12 DIAGNOSIS — J30.0 VASOMOTOR RHINITIS: ICD-10-CM

## 2020-02-12 DIAGNOSIS — I10 ESSENTIAL HYPERTENSION: ICD-10-CM

## 2020-02-12 DIAGNOSIS — J44.9 CHRONIC OBSTRUCTIVE PULMONARY DISEASE, UNSPECIFIED COPD TYPE (HCC): ICD-10-CM

## 2020-02-12 DIAGNOSIS — D50.8 IRON DEFICIENCY ANEMIA SECONDARY TO INADEQUATE DIETARY IRON INTAKE: ICD-10-CM

## 2020-02-12 DIAGNOSIS — I25.118 CORONARY ARTERY DISEASE OF NATIVE HEART WITH STABLE ANGINA PECTORIS, UNSPECIFIED VESSEL OR LESION TYPE (HCC): ICD-10-CM

## 2020-02-12 DIAGNOSIS — Z72.0 TOBACCO USE: ICD-10-CM

## 2020-02-12 DIAGNOSIS — E11.22 CKD STAGE 3 DUE TO TYPE 2 DIABETES MELLITUS (HCC): ICD-10-CM

## 2020-02-12 DIAGNOSIS — Z12.11 SCREENING FOR COLON CANCER: ICD-10-CM

## 2020-02-12 DIAGNOSIS — N18.30 CKD STAGE 3 DUE TO TYPE 2 DIABETES MELLITUS (HCC): ICD-10-CM

## 2020-02-12 DIAGNOSIS — I50.22 CHRONIC SYSTOLIC (CONGESTIVE) HEART FAILURE (HCC): ICD-10-CM

## 2020-02-12 DIAGNOSIS — M62.838 MUSCLE SPASM: ICD-10-CM

## 2020-02-12 DIAGNOSIS — I47.2 VENTRICULAR TACHYCARDIA (HCC): ICD-10-CM

## 2020-02-12 DIAGNOSIS — E11.51 DM (DIABETES MELLITUS), TYPE 2 WITH PERIPHERAL VASCULAR COMPLICATIONS (HCC): Primary | ICD-10-CM

## 2020-02-12 PROBLEM — Z00.00 MEDICARE ANNUAL WELLNESS VISIT, SUBSEQUENT: Status: RESOLVED | Noted: 2019-06-18 | Resolved: 2020-02-12

## 2020-02-12 PROBLEM — Z13.29 SCREENING FOR ENDOCRINE, METABOLIC AND IMMUNITY DISORDER: Status: RESOLVED | Noted: 2019-06-18 | Resolved: 2020-02-12

## 2020-02-12 PROBLEM — Z86.73 PERSONAL HISTORY OF TRANSIENT ISCHEMIC ATTACK (TIA), AND CEREBRAL INFARCTION WITHOUT RESIDUAL DEFICITS: Status: ACTIVE | Noted: 2020-02-12

## 2020-02-12 PROBLEM — Z13.29 SCREENING FOR THYROID DISORDER: Status: RESOLVED | Noted: 2019-06-18 | Resolved: 2020-02-12

## 2020-02-12 PROBLEM — I47.29 PAROXYSMAL VENTRICULAR TACHYCARDIA: Status: ACTIVE | Noted: 2020-02-12

## 2020-02-12 PROBLEM — N18.4 CHRONIC KIDNEY DISEASE, STAGE 4 (SEVERE) (HCC): Status: ACTIVE | Noted: 2020-02-12

## 2020-02-12 PROBLEM — E78.5 DYSLIPIDEMIA: Status: ACTIVE | Noted: 2020-02-12

## 2020-02-12 PROBLEM — T82.9XXA: Status: ACTIVE | Noted: 2020-02-12

## 2020-02-12 PROBLEM — G45.9 TRANSIENT ISCHEMIC ATTACK: Status: ACTIVE | Noted: 2020-02-12

## 2020-02-12 PROBLEM — Z13.0 SCREENING FOR ENDOCRINE, METABOLIC AND IMMUNITY DISORDER: Status: RESOLVED | Noted: 2019-06-18 | Resolved: 2020-02-12

## 2020-02-12 PROBLEM — I49.9 VENTRICULAR ARRHYTHMIA: Status: ACTIVE | Noted: 2020-02-12

## 2020-02-12 PROBLEM — K82.9 GALLBLADDER DISEASE: Status: ACTIVE | Noted: 2020-02-12

## 2020-02-12 PROBLEM — I34.0 NON-RHEUMATIC MITRAL REGURGITATION: Status: ACTIVE | Noted: 2020-02-12

## 2020-02-12 PROBLEM — Z13.0 SCREENING FOR IRON DEFICIENCY ANEMIA: Status: RESOLVED | Noted: 2019-06-18 | Resolved: 2020-02-12

## 2020-02-12 PROBLEM — Z95.810 AUTOMATIC IMPLANTABLE CARDIAC DEFIBRILLATOR IN SITU: Status: ACTIVE | Noted: 2020-02-12

## 2020-02-12 PROBLEM — Z13.228 SCREENING FOR ENDOCRINE, METABOLIC AND IMMUNITY DISORDER: Status: RESOLVED | Noted: 2019-06-18 | Resolved: 2020-02-12

## 2020-02-12 PROBLEM — I47.20 VENTRICULAR TACHYCARDIA: Status: ACTIVE | Noted: 2020-02-12

## 2020-02-12 PROBLEM — G47.10 HYPERSOMNIA: Status: ACTIVE | Noted: 2020-02-12

## 2020-02-12 PROBLEM — Z79.899 OTHER LONG TERM (CURRENT) DRUG THERAPY: Status: ACTIVE | Noted: 2020-02-12

## 2020-02-12 PROBLEM — I25.2 OLD MYOCARDIAL INFARCTION: Status: ACTIVE | Noted: 2020-02-12

## 2020-02-12 LAB — SL AMB POCT HEMOGLOBIN AIC: 5.5 (ref ?–6.5)

## 2020-02-12 PROCEDURE — 99214 OFFICE O/P EST MOD 30 MIN: CPT | Performed by: FAMILY MEDICINE

## 2020-02-12 PROCEDURE — 3044F HG A1C LEVEL LT 7.0%: CPT | Performed by: FAMILY MEDICINE

## 2020-02-12 PROCEDURE — 3008F BODY MASS INDEX DOCD: CPT | Performed by: FAMILY MEDICINE

## 2020-02-12 PROCEDURE — 3078F DIAST BP <80 MM HG: CPT | Performed by: FAMILY MEDICINE

## 2020-02-12 PROCEDURE — 3074F SYST BP LT 130 MM HG: CPT | Performed by: FAMILY MEDICINE

## 2020-02-12 PROCEDURE — 83036 HEMOGLOBIN GLYCOSYLATED A1C: CPT | Performed by: FAMILY MEDICINE

## 2020-02-12 PROCEDURE — 1160F RVW MEDS BY RX/DR IN RCRD: CPT | Performed by: FAMILY MEDICINE

## 2020-02-12 PROCEDURE — 3066F NEPHROPATHY DOC TX: CPT | Performed by: FAMILY MEDICINE

## 2020-02-12 PROCEDURE — 4040F PNEUMOC VAC/ADMIN/RCVD: CPT | Performed by: FAMILY MEDICINE

## 2020-02-12 RX ORDER — CARISOPRODOL 350 MG/1
350 TABLET ORAL 4 TIMES DAILY
Qty: 120 TABLET | Refills: 0 | Status: SHIPPED | OUTPATIENT
Start: 2020-02-12 | End: 2020-02-17 | Stop reason: SDUPTHER

## 2020-02-12 RX ORDER — IPRATROPIUM BROMIDE 42 UG/1
2 SPRAY, METERED NASAL 4 TIMES DAILY
Qty: 15 ML | Refills: 5 | Status: SHIPPED | OUTPATIENT
Start: 2020-02-12 | End: 2022-03-24 | Stop reason: SDUPTHER

## 2020-02-12 NOTE — PATIENT INSTRUCTIONS
Only use flonase once a day  A different nasal spray- atrovent nasal spray up to 4 times a day- 2 sprays each nostril as needed for drippy nose  5 5 hba1c recheck 5/12   Take iron every other day-  Ok for stool softener twice a day COLACE -   Blood count and iron level in 4 weeks  CHARLY WILL COME IN THE MAIL FOR COLON CANCER SCREEN  KEEP APPT WITH CARDIOLOGY 2/27   Can get eye appointment too? Iron Rich Diet   WHAT YOU NEED TO KNOW:   An iron-rich diet includes foods that are good sources of iron  People need extra iron during childhood, adolescence (teenage years), and pregnancy  Iron is a mineral that your body needs to make hemoglobin  Hemoglobin is part of your blood and helps carry oxygen from your lungs to the rest of your body  You may need to follow this diet to treat or prevent a low blood iron level or iron deficiency anemia  DISCHARGE INSTRUCTIONS:   Daily iron needs:   · Males:      ¨ 3to 1years old: 7 mg    ¨ 3to 6years old: 10 mg    ¨ 5to 15years old: 8 mg    ¨ 15to 25years old: 11 mg    ¨ 19 years and older: 8 mg    · Females:      ¨ 3to 1years old: 7 mg    ¨ 3to 6years old: 10 mg    ¨ 5to 15years old: 8 mg    ¨ 15to 25years old: 15 mg    ¨ 19 to 50 years: 18 mg    ¨ Over 46years old: 8 mg    ¨ Pregnant women:  27 mg  Foods that contain iron:   · Meat, fish, and poultry are good sources of iron  They contain heme iron, a form of iron that your body absorbs very well  Fruit, vegetables, eggs, and grains such as pasta, rice, and cereal also contain iron  They contain nonheme iron, a form of iron that is not absorbed as well as heme iron  You can absorb more iron from these foods by eating a food that is high in vitamin C at the same time  You can also absorb more nonheme iron by eating a food from the meat, fish, and poultry group at the same time  · Fish and shellfish contain some mercury, a metal that can be harmful to the body   Children and unborn babies are at higher risk for harm caused by mercury  Children and pregnant women should avoid eating fish high in mercury, such as shark and swordfish  They should also eat only fish that are lower in mercury, such as salmon, canned light tuna, and catfish  Limit the amount of low-mercury fish and shellfish you eat to less than 12 ounces per week  Iron-rich foods:   · Foods that contain 2 mg or more per serving:      ¨ 3 ounces of cooked beef (celine, eye of round) or cooked turkey (dark meat)    ¨ ½ cup of beans (black, kidney, or lentil, or soybeans)    ¨ ½ cup of tofu    ¨ 1 medium baked potato    ¨ 1 cup of cooked artichoke or cooked spinach    ¨ ¾ cup of instant oatmeal    ¨ 1 cup of corn flakes    · Foods that contain 1 to 2 mg per serving:      ¨ 3 ounces of chicken    ¨ 3 ounces of pork    ¨ 3 ounces of turkey (light meat)    ¨ 3 ounces of light tuna    ¨ ½ cup of seedless, packed raisins    ¨ 1 slice of whole-wheat or white bread  Good sources of vitamin C:  Eat a serving of vitamin C with any iron-rich food to help your body absorb more iron  The following fruits and vegetables are good sources of vitamin C:  · 1 cup of fresh orange juice (124 mg) or pink grapefruit juice (83 mg)    · 1 cup of strawberries (106 mg)    · 1 cup of diced cantaloupe (68 mg)     · 1 cup of sweet yellow pepper (283 mg)    · 1 cup of fresh, boiled broccoli (116 mg) or cooked brussels sprouts (97 mg)    · 1 cup of kale (53 mg)    · 1 cup of tomato juice (45 mg)  Other guidelines to follow:   · Tea and coffee can decrease the amount of iron that your body absorbs from iron-rich foods  Drink coffee and tea separately from meals that contain iron-rich foods  · Children over the age of 1 year only need about 24 ounces of cow's milk each day  When children drink too much milk, they may eat fewer iron-rich foods  This may cause them to have a low level of iron in their blood  Risks:   If you do not eat iron-rich foods and vitamin C every day, you may have low blood iron levels  This may lead to iron deficiency anemia, especially during periods when your body needs extra iron  Iron deficiency anemia may cause problems with your child's growth and development  If you have iron deficiency anemia, you may develop other health problems  © 2017 [employer] Information is for End User's use only and may not be sold, redistributed or otherwise used for commercial purposes  All illustrations and images included in CareNotes® are the copyrighted property of [employer] , [employer]  or Ronnie Marshall  The above information is an  only  It is not intended as medical advice for individual conditions or treatments  Talk to your doctor, nurse or pharmacist before following any medical regimen to see if it is safe and effective for you

## 2020-02-12 NOTE — PROGRESS NOTES
Assessment/Plan:      Diagnoses and all orders for this visit:    DM (diabetes mellitus), type 2 with peripheral vascular complications ([place] [address] )  Comments:  controlled, hba1c 5 5 today,   Orders:  -     POCT hemoglobin A1c    Vasomotor rhinitis  Comments:  trial atrovent nasal spray  Orders:  -     ipratropium (ATROVENT) 0 06 % nasal spray; 2 sprays into each nostril 4 (four) times a day    Iron deficiency anemia secondary to inadequate dietary iron intake  Comments:  due for cbc and iron  trial of iron every other day  Orders:  -     CBC and differential; Future  -     Iron; Future    Muscle spasm  Comments:  renewal soma - taking  4 times a day   Orders:  -     carisoprodol (SOMA) 350 mg tablet; Take 1 tablet (350 mg total) by mouth 4 (four) times a day    Chronic obstructive pulmonary disease, unspecified COPD type ([place] [address] )  Comments:  stable, pt uses rescue inhaler at bedtime  Orders:  -     VENTOLIN  (90 Base) MCG/ACT inhaler; Inhale 2 puffs every 4 (four) hours as needed for wheezing    Chronic systolic (congestive) heart failure (HCC)  Comments:  stable, has follow up with cardiology    CKD stage 3 due to type 2 diabetes mellitus ([place] [address] )  Comments:  stable, due for cmp    Ventricular tachycardia ([place] [address] )  Comments:  keep follow up with cardiology    Coronary artery disease of native heart with stable angina pectoris, unspecified vessel or lesion type ([place] [address] )  Comments:  stable, continue current medication    Screening for colon cancer  Comments:  pt will do cologuard  Orders:  -     Cologuard; Future    Essential hypertension  Comments:  controlled, continue current medication    Tobacco use  Comments:  not ready to quit    Other orders  -     lidocaine (URO-JET) 2 % urethral/mucosal gel          Subjective:  Chief Complaint   Patient presents with    Medication Check     Pt here today for his medication check and diabetes follow-up  FBW complete 10/29/19  Last a1c was 6 0 on 6/18/19   Pt states he has not been to the eye doctor  He does not have one  Forms given on diabetic eye exam and doctors  Cologuard will be ordered  Patient ID: Jabari Abreu  is a 77 y o  male  Pt here for follow up on chronic conditions  He denies any recent illnesses  He is agreeing to do a cologuard  Still smoking  Pt was [facility] at the [facility], for gallbladder issues but states he was not a surgical candidate unless the symptoms increase  He has not had further problems  Pt has not had an eye exam  He has an upcoming cardiology appointment  Complains of clear drippy nose  Using flonase  His blood count has been low but he has been unable to take the iron due to constipation  He will try taking it every other day  Review of Systems   Constitutional: Negative  Negative for fatigue and fever  HENT: Positive for rhinorrhea  Eyes: Negative  Respiratory: Negative  Negative for cough  Cardiovascular: Negative  Gastrointestinal: Negative  Endocrine: Negative  Genitourinary: Negative  Musculoskeletal: Negative  Skin: Negative  Allergic/Immunologic: Negative  Neurological: Negative  Psychiatric/Behavioral: Negative  The following portions of the patient's history were reviewed and updated as appropriate: allergies, current medications, past family history, past medical history, past social history, past surgical history and problem list     Objective:  Vitals:    02/12/20 1041   BP: 116/68   Pulse: 57   Temp: 97 7 °F (36 5 °C)   SpO2: 97%   Weight: 89 5 kg (197 lb 4 oz)   Height: 5' 9 5" (1 765 m)      Physical Exam   Constitutional: He is oriented to person, place, and time  He appears well-developed and well-nourished  HENT:   Head: Normocephalic and atraumatic  Right Ear: External ear normal    Left Ear: External ear normal    Nasal congestion   Cardiovascular: Normal rate, regular rhythm, normal heart sounds and intact distal pulses     Pulmonary/Chest: Effort normal and breath sounds normal    Abdominal: Soft  Bowel sounds are normal    Neurological: He is alert and oriented to person, place, and time  Skin: Skin is warm and dry  Psychiatric: He has a normal mood and affect  His behavior is normal  Judgment and thought content normal    Nursing note and vitals reviewed

## 2020-02-17 DIAGNOSIS — M62.838 MUSCLE SPASM: ICD-10-CM

## 2020-02-17 RX ORDER — CARISOPRODOL 350 MG/1
350 TABLET ORAL 4 TIMES DAILY
Qty: 120 TABLET | Refills: 0 | Status: SHIPPED | OUTPATIENT
Start: 2020-02-17 | End: 2020-04-20 | Stop reason: SDUPTHER

## 2020-02-28 DIAGNOSIS — E78.49 OTHER HYPERLIPIDEMIA: ICD-10-CM

## 2020-02-28 DIAGNOSIS — R35.0 BENIGN PROSTATIC HYPERPLASIA WITH URINARY FREQUENCY: ICD-10-CM

## 2020-02-28 DIAGNOSIS — N40.1 BENIGN PROSTATIC HYPERPLASIA WITH URINARY FREQUENCY: ICD-10-CM

## 2020-02-28 RX ORDER — ATORVASTATIN CALCIUM 80 MG/1
80 TABLET, FILM COATED ORAL DAILY
Qty: 90 TABLET | Refills: 0 | Status: SHIPPED | OUTPATIENT
Start: 2020-02-28 | End: 2020-05-21 | Stop reason: SDUPTHER

## 2020-02-28 RX ORDER — TAMSULOSIN HYDROCHLORIDE 0.4 MG/1
0.4 CAPSULE ORAL
Qty: 90 CAPSULE | Refills: 0 | Status: SHIPPED | OUTPATIENT
Start: 2020-02-28 | End: 2020-05-21 | Stop reason: SDUPTHER

## 2020-03-06 DIAGNOSIS — I25.84 CORONARY ATHEROSCLEROSIS DUE TO CALCIFIED CORONARY LESION: ICD-10-CM

## 2020-03-06 DIAGNOSIS — G89.4 CHRONIC PAIN SYNDROME: ICD-10-CM

## 2020-03-06 DIAGNOSIS — I10 ESSENTIAL HYPERTENSION: ICD-10-CM

## 2020-03-06 DIAGNOSIS — F41.9 ANXIETY: ICD-10-CM

## 2020-03-06 DIAGNOSIS — I63.9 RIGHT HEMISPHERE, CEREBRAL INFARCTION (HCC): ICD-10-CM

## 2020-03-06 DIAGNOSIS — I73.9 PERIPHERAL VASCULAR DISEASE (HCC): ICD-10-CM

## 2020-03-06 DIAGNOSIS — I25.10 CORONARY ATHEROSCLEROSIS DUE TO CALCIFIED CORONARY LESION: ICD-10-CM

## 2020-03-07 RX ORDER — LORAZEPAM 0.5 MG/1
0.5 TABLET ORAL EVERY 8 HOURS PRN
Qty: 90 TABLET | Refills: 0 | Status: SHIPPED | OUTPATIENT
Start: 2020-03-07 | End: 2020-04-03 | Stop reason: SDUPTHER

## 2020-03-07 RX ORDER — OXYCODONE HYDROCHLORIDE 30 MG/1
TABLET ORAL
Qty: 180 TABLET | Refills: 0 | Status: SHIPPED | OUTPATIENT
Start: 2020-03-07 | End: 2020-04-03 | Stop reason: SDUPTHER

## 2020-03-07 RX ORDER — CLOPIDOGREL BISULFATE 75 MG/1
75 TABLET ORAL DAILY
Qty: 30 TABLET | Refills: 5 | Status: SHIPPED | OUTPATIENT
Start: 2020-03-07 | End: 2020-08-27 | Stop reason: SDUPTHER

## 2020-03-11 DIAGNOSIS — L97.509 ULCER OF TOE, UNSPECIFIED LATERALITY, UNSPECIFIED ULCER STAGE (HCC): ICD-10-CM

## 2020-03-12 DIAGNOSIS — L97.509 ULCER OF TOE, UNSPECIFIED LATERALITY, UNSPECIFIED ULCER STAGE (HCC): ICD-10-CM

## 2020-03-13 ENCOUNTER — DOCUMENTATION (OUTPATIENT)
Dept: FAMILY MEDICINE CLINIC | Facility: CLINIC | Age: 75
End: 2020-03-13

## 2020-03-13 NOTE — PROGRESS NOTES
Lidocaine cannot be ordered, they do not have it in their supplies any longer   discontinued  Please send something else

## 2020-03-16 ENCOUNTER — TELEPHONE (OUTPATIENT)
Dept: FAMILY MEDICINE CLINIC | Facility: CLINIC | Age: 75
End: 2020-03-16

## 2020-03-16 NOTE — TELEPHONE ENCOUNTER
She called the pharmacy for a refill on the lidocaine 2% topical gel  They are no longer making it  Can you call something else in for him

## 2020-03-17 ENCOUNTER — TELEPHONE (OUTPATIENT)
Dept: FAMILY MEDICINE CLINIC | Facility: CLINIC | Age: 75
End: 2020-03-17

## 2020-03-17 DIAGNOSIS — M79.672 LEFT FOOT PAIN: Primary | ICD-10-CM

## 2020-03-17 RX ORDER — LIDOCAINE 50 MG/G
OINTMENT TOPICAL AS NEEDED
Qty: 50 G | Refills: 0 | Status: SHIPPED | OUTPATIENT
Start: 2020-03-17

## 2020-03-17 NOTE — TELEPHONE ENCOUNTER
There are no substitutions available  It may still be available over the counter if it is helpful   As zcaine

## 2020-03-17 NOTE — PROGRESS NOTES
See other note, no other prescription comparable    May still be available over the counter, zcaine, if it is helpful

## 2020-03-25 DIAGNOSIS — I10 ESSENTIAL HYPERTENSION: ICD-10-CM

## 2020-03-25 RX ORDER — FUROSEMIDE 40 MG/1
60 TABLET ORAL DAILY
Qty: 135 TABLET | Refills: 0 | Status: SHIPPED | OUTPATIENT
Start: 2020-03-25 | End: 2020-06-22 | Stop reason: SDUPTHER

## 2020-03-25 NOTE — TELEPHONE ENCOUNTER
Requested medication(s) are due for refill today: Yes  Patient has already received a courtesy refill: No  Other reason request has been forwarded to provider:  Red stop / furosemide

## 2020-03-26 ENCOUNTER — TELEPHONE (OUTPATIENT)
Dept: FAMILY MEDICINE CLINIC | Facility: CLINIC | Age: 75
End: 2020-03-26

## 2020-03-30 DIAGNOSIS — F41.9 ANXIETY: ICD-10-CM

## 2020-03-30 DIAGNOSIS — G89.4 CHRONIC PAIN SYNDROME: ICD-10-CM

## 2020-03-30 RX ORDER — OXYCODONE HYDROCHLORIDE 30 MG/1
TABLET ORAL
Qty: 180 TABLET | Refills: 0 | Status: CANCELLED | OUTPATIENT
Start: 2020-03-30

## 2020-03-31 ENCOUNTER — TELEMEDICINE (OUTPATIENT)
Dept: FAMILY MEDICINE CLINIC | Facility: CLINIC | Age: 75
End: 2020-03-31
Payer: MEDICARE

## 2020-03-31 VITALS — BODY MASS INDEX: 28.2 KG/M2 | HEIGHT: 70 IN | WEIGHT: 197 LBS

## 2020-03-31 DIAGNOSIS — G89.4 CHRONIC PAIN SYNDROME: Chronic | ICD-10-CM

## 2020-03-31 DIAGNOSIS — E11.51 DM (DIABETES MELLITUS), TYPE 2 WITH PERIPHERAL VASCULAR COMPLICATIONS (HCC): ICD-10-CM

## 2020-03-31 PROCEDURE — 99214 OFFICE O/P EST MOD 30 MIN: CPT | Performed by: FAMILY MEDICINE

## 2020-03-31 RX ORDER — OXYCODONE HYDROCHLORIDE 30 MG/1
30 TABLET ORAL EVERY 4 HOURS PRN
Qty: 25 TABLET | Refills: 0 | Status: SHIPPED | OUTPATIENT
Start: 2020-03-31 | End: 2020-05-01 | Stop reason: SDUPTHER

## 2020-03-31 NOTE — PROGRESS NOTES
Virtual Regular Visit    Problem List Items Addressed This Visit        Cardiovascular and Mediastinum    DM (diabetes mellitus), type 2 with peripheral vascular complications ([facility] [place] [address] ) - Primary       Other    Chronic pain    Relevant Medications    oxyCODONE (ROXICODONE) 30 MG immediate release tablet               Reason for visit is low back pain     Encounter provider Mary Ann Mccarty DO    Provider located at [place] [address]  [address] [facility] [id number]      Recent Visits  Date Type Provider Dept   03/26/20 Telephone CRISPIN Wall Pg   Showing recent visits within past 7 days and meeting all other requirements     Today's Visits  Date Type Provider Dept   03/31/20 Telemedicine DO Himanshu Viveros   Showing today's visits and meeting all other requirements     Future Appointments  No visits were found meeting these conditions  Showing future appointments within next 150 days and meeting all other requirements        The patient was identified by name and date of birth  Waqas Carver  was informed that this is a telemedicine visit and that the visit is being conducted through [employer] and patient was informed that this is not a secure, HIPAA-complaint platform  he agrees to proceed     My office door was closed  No one else was in the room  He acknowledged consent and understanding of privacy and security of the video platform  The patient has agreed to participate and understands they can discontinue the visit at any time  Patient is aware this is a billable service  Gavin Emmanuel  is a 77 y o  male was seen on a virtual visit with his son Gabo Patrick  He complains of pain in both feet  He is having trouble getting around due to the pain  He had topical lidoderm 2% gel that is no longer available that helped significantly  I sent 5% gel but they stated it was too expensive $35  He denies any cold or respiratory symptoms   He has not been out of the house  His bms are ok  He has been taking more of his pain medication since running out of the topical gel         Past Medical History:   Diagnosis Date    Acute myocardial infarction [facility])     Anxiety     Last Assessed: 3/11/2013    Cellulitis of fifth toe, left     Last Assessed: 3/9/2017    Central obesity     Last Assessed: 4/11/2017    Ulcer of toe of left foot ([address] )     Last Assessed: 3/9/2017       Past Surgical History:   Procedure Laterality Date    APPENDECTOMY      CARDIAC DEFIBRILLATOR PLACEMENT      CORONARY ANGIOPLASTY WITH STENT PLACEMENT      Last Assessed: 8/6/2012    INGUINAL HERNIA REPAIR Left        Current Outpatient Medications   Medication Sig Dispense Refill    amLODIPine (NORVASC) 2 5 mg tablet TAKE 1 TABLET BY MOUTH EVERY DAY 90 tablet 0    atenolol (TENORMIN) 25 mg tablet 1/2 tab daily 15 tablet 5    atorvastatin (LIPITOR) 80 mg tablet Take 1 tablet (80 mg total) by mouth daily 90 tablet 0    budesonide-formoterol (SYMBICORT) 160-4 5 mcg/act inhaler Inhale 2 puffs 2 (two) times a day 1 Inhaler 5    carisoprodol (SOMA) 350 mg tablet Take 1 tablet (350 mg total) by mouth 4 (four) times a day 120 tablet 0    clopidogrel (PLAVIX) 75 mg tablet Take 1 tablet (75 mg total) by mouth daily 30 tablet 5    famotidine (PEPCID) 20 mg tablet Take 1 tablet by mouth 2 (two) times a day      fluticasone (FLONASE) 50 mcg/act nasal spray 2 sprays into each nostril daily 1 Bottle 2    furosemide (LASIX) 40 mg tablet TAKE 1 5 TABLETS (60 MG TOTAL) BY MOUTH DAILY 135 tablet 0    ipratropium (ATROVENT) 0 06 % nasal spray 2 sprays into each nostril 4 (four) times a day 15 mL 5    lidocaine (XYLOCAINE) 5 % ointment Apply topically as needed for mild pain (Patient taking differently: Apply topically as needed for mild pain ) 50 g 0    LORazepam (ATIVAN) 0 5 mg tablet Take 1 tablet (0 5 mg total) by mouth every 8 (eight) hours as needed (PRN) 90 tablet 0    nitroglycerin (NITRODUR) 0 4 mg/hr Place 1 patch on the skin daily 30 patch 2    oxyCODONE (ROXICODONE) 30 MG immediate release tablet 1 tab every 4 hours as needed for pain 180 tablet 0    tamsulosin (FLOMAX) 0 4 mg Take 1 capsule (0 4 mg total) by mouth daily at bedtime 90 capsule 0    VENTOLIN  (90 Base) MCG/ACT inhaler Inhale 2 puffs every 4 (four) hours as needed for wheezing 18 Inhaler 5    enalapril (VASOTEC) 20 mg tablet TAKE 1 TABLET BY MOUTH EVERY DAY 90 tablet 2    oxyCODONE (ROXICODONE) 30 MG immediate release tablet Take 1 tablet (30 mg total) by mouth every 4 (four) hours as needed for moderate painMax Daily Amount: 180 mg 25 tablet 0     No current facility-administered medications for this visit  Allergies   Allergen Reactions    Gabapentin Hallucinations     Category: Adverse Reaction; Review of Systems   Constitutional: Negative  Negative for fatigue and fever  HENT: Negative  Eyes: Negative  Respiratory: Negative  Negative for cough  Cardiovascular: Negative  Gastrointestinal: Negative  Endocrine: Negative  Genitourinary: Negative  Musculoskeletal: Positive for arthralgias, back pain and gait problem  Bilateral foot pain    Skin: Negative  Allergic/Immunologic: Negative  Psychiatric/Behavioral: Negative  Physical Exam   Constitutional: He is oriented to person, place, and time  He appears well-developed  HENT:   Head: Normocephalic  Eyes: Conjunctivae are normal    Pulmonary/Chest: Effort normal    Neurological: He is alert and oriented to person, place, and time  Psychiatric: He has a normal mood and affect  His behavior is normal  Judgment and thought content normal         I spent 20 minutes with the patient during this visit

## 2020-03-31 NOTE — PATIENT INSTRUCTIONS
Sent renewal of short term amount of oxycodone until rx due on 4/4  Trial lidoderm patches over the counter    Markiesha Huston, son, checking on [facility] discount for lidoderm 5% cream

## 2020-04-03 DIAGNOSIS — F41.9 ANXIETY: ICD-10-CM

## 2020-04-03 DIAGNOSIS — G89.4 CHRONIC PAIN SYNDROME: ICD-10-CM

## 2020-04-03 RX ORDER — LORAZEPAM 0.5 MG/1
0.5 TABLET ORAL EVERY 8 HOURS PRN
Qty: 90 TABLET | Refills: 0 | Status: SHIPPED | OUTPATIENT
Start: 2020-04-03 | End: 2020-05-01 | Stop reason: SDUPTHER

## 2020-04-03 RX ORDER — OXYCODONE HYDROCHLORIDE 30 MG/1
TABLET ORAL
Qty: 180 TABLET | Refills: 0 | Status: SHIPPED | OUTPATIENT
Start: 2020-04-03 | End: 2020-05-01 | Stop reason: SDUPTHER

## 2020-04-06 RX ORDER — LORAZEPAM 0.5 MG/1
0.5 TABLET ORAL EVERY 8 HOURS PRN
Qty: 90 TABLET | Refills: 0 | OUTPATIENT
Start: 2020-04-06 | End: 2020-05-06

## 2020-04-08 DIAGNOSIS — J30.1 SEASONAL ALLERGIC RHINITIS DUE TO POLLEN: ICD-10-CM

## 2020-04-08 RX ORDER — FLUTICASONE PROPIONATE 50 MCG
SPRAY, SUSPENSION (ML) NASAL
Qty: 48 ML | Refills: 0 | Status: SHIPPED | OUTPATIENT
Start: 2020-04-08

## 2020-04-20 DIAGNOSIS — I10 ESSENTIAL HYPERTENSION: ICD-10-CM

## 2020-04-20 DIAGNOSIS — J44.9 CHRONIC OBSTRUCTIVE PULMONARY DISEASE, UNSPECIFIED COPD TYPE (HCC): ICD-10-CM

## 2020-04-20 DIAGNOSIS — M62.838 MUSCLE SPASM: ICD-10-CM

## 2020-04-20 RX ORDER — AMLODIPINE BESYLATE 2.5 MG/1
2.5 TABLET ORAL DAILY
Qty: 90 TABLET | Refills: 0 | Status: SHIPPED | OUTPATIENT
Start: 2020-04-20 | End: 2020-04-20 | Stop reason: SDUPTHER

## 2020-04-20 RX ORDER — CARISOPRODOL 350 MG/1
350 TABLET ORAL 4 TIMES DAILY
Qty: 120 TABLET | Refills: 0 | Status: SHIPPED | OUTPATIENT
Start: 2020-04-20 | End: 2020-05-20 | Stop reason: SDUPTHER

## 2020-04-20 RX ORDER — AMLODIPINE BESYLATE 2.5 MG/1
2.5 TABLET ORAL DAILY
Qty: 90 TABLET | Refills: 0 | Status: SHIPPED | OUTPATIENT
Start: 2020-04-20 | End: 2020-07-13

## 2020-04-29 DIAGNOSIS — G89.4 CHRONIC PAIN SYNDROME: Chronic | ICD-10-CM

## 2020-04-29 DIAGNOSIS — F41.9 ANXIETY: ICD-10-CM

## 2020-04-29 RX ORDER — LORAZEPAM 0.5 MG/1
0.5 TABLET ORAL EVERY 8 HOURS PRN
Qty: 90 TABLET | Refills: 0 | OUTPATIENT
Start: 2020-04-29 | End: 2020-05-29

## 2020-04-29 RX ORDER — OXYCODONE HYDROCHLORIDE 30 MG/1
30 TABLET ORAL EVERY 4 HOURS PRN
Qty: 25 TABLET | Refills: 0 | OUTPATIENT
Start: 2020-04-29

## 2020-05-01 DIAGNOSIS — G89.4 CHRONIC PAIN SYNDROME: ICD-10-CM

## 2020-05-01 DIAGNOSIS — F41.9 ANXIETY: ICD-10-CM

## 2020-05-01 RX ORDER — OXYCODONE HYDROCHLORIDE 30 MG/1
TABLET ORAL
Qty: 180 TABLET | Refills: 0 | Status: SHIPPED | OUTPATIENT
Start: 2020-05-01 | End: 2020-05-27 | Stop reason: SDUPTHER

## 2020-05-01 RX ORDER — LORAZEPAM 0.5 MG/1
0.5 TABLET ORAL EVERY 8 HOURS PRN
Qty: 90 TABLET | Refills: 0 | Status: SHIPPED | OUTPATIENT
Start: 2020-05-01 | End: 2020-05-29 | Stop reason: SDUPTHER

## 2020-05-08 DIAGNOSIS — I10 ESSENTIAL HYPERTENSION: ICD-10-CM

## 2020-05-08 RX ORDER — ATENOLOL 25 MG/1
TABLET ORAL
Qty: 15 TABLET | Refills: 5 | Status: SHIPPED | OUTPATIENT
Start: 2020-05-08 | End: 2021-12-02 | Stop reason: SDUPTHER

## 2020-05-20 DIAGNOSIS — M62.838 MUSCLE SPASM: ICD-10-CM

## 2020-05-20 DIAGNOSIS — F41.9 ANXIETY: ICD-10-CM

## 2020-05-20 RX ORDER — LORAZEPAM 0.5 MG/1
0.5 TABLET ORAL EVERY 8 HOURS PRN
Qty: 90 TABLET | Refills: 0 | OUTPATIENT
Start: 2020-05-20 | End: 2020-06-19

## 2020-05-20 RX ORDER — CARISOPRODOL 350 MG/1
350 TABLET ORAL 4 TIMES DAILY
Qty: 120 TABLET | Refills: 0 | Status: SHIPPED | OUTPATIENT
Start: 2020-05-20 | End: 2020-06-19 | Stop reason: SDUPTHER

## 2020-05-20 RX ORDER — CARISOPRODOL 350 MG/1
350 TABLET ORAL 4 TIMES DAILY
Qty: 120 TABLET | Refills: 0 | OUTPATIENT
Start: 2020-05-20

## 2020-05-21 DIAGNOSIS — R35.0 BENIGN PROSTATIC HYPERPLASIA WITH URINARY FREQUENCY: ICD-10-CM

## 2020-05-21 DIAGNOSIS — N40.1 BENIGN PROSTATIC HYPERPLASIA WITH URINARY FREQUENCY: ICD-10-CM

## 2020-05-21 DIAGNOSIS — E78.49 OTHER HYPERLIPIDEMIA: ICD-10-CM

## 2020-05-21 RX ORDER — ATORVASTATIN CALCIUM 80 MG/1
80 TABLET, FILM COATED ORAL DAILY
Qty: 90 TABLET | Refills: 0 | Status: SHIPPED | OUTPATIENT
Start: 2020-05-21 | End: 2020-08-17

## 2020-05-21 RX ORDER — TAMSULOSIN HYDROCHLORIDE 0.4 MG/1
0.4 CAPSULE ORAL
Qty: 90 CAPSULE | Refills: 0 | Status: SHIPPED | OUTPATIENT
Start: 2020-05-21 | End: 2020-06-05 | Stop reason: SDUPTHER

## 2020-05-27 DIAGNOSIS — G89.4 CHRONIC PAIN SYNDROME: ICD-10-CM

## 2020-05-28 RX ORDER — OXYCODONE HYDROCHLORIDE 30 MG/1
TABLET ORAL
Qty: 180 TABLET | Refills: 0 | Status: SHIPPED | OUTPATIENT
Start: 2020-05-28 | End: 2020-06-24 | Stop reason: SDUPTHER

## 2020-05-29 DIAGNOSIS — F41.9 ANXIETY: ICD-10-CM

## 2020-05-29 DIAGNOSIS — G89.4 CHRONIC PAIN SYNDROME: ICD-10-CM

## 2020-05-29 RX ORDER — LORAZEPAM 0.5 MG/1
0.5 TABLET ORAL EVERY 8 HOURS PRN
Qty: 90 TABLET | Refills: 0 | Status: SHIPPED | OUTPATIENT
Start: 2020-05-29 | End: 2020-08-10

## 2020-05-29 RX ORDER — LORAZEPAM 0.5 MG/1
0.5 TABLET ORAL EVERY 8 HOURS PRN
Qty: 90 TABLET | Refills: 0 | OUTPATIENT
Start: 2020-05-29 | End: 2020-06-28

## 2020-05-29 RX ORDER — OXYCODONE HYDROCHLORIDE 30 MG/1
TABLET ORAL
Qty: 180 TABLET | Refills: 0 | OUTPATIENT
Start: 2020-05-29

## 2020-06-03 DIAGNOSIS — N40.1 BENIGN PROSTATIC HYPERPLASIA WITH URINARY FREQUENCY: ICD-10-CM

## 2020-06-03 DIAGNOSIS — R35.0 BENIGN PROSTATIC HYPERPLASIA WITH URINARY FREQUENCY: ICD-10-CM

## 2020-06-03 RX ORDER — TAMSULOSIN HYDROCHLORIDE 0.4 MG/1
0.4 CAPSULE ORAL
Qty: 90 CAPSULE | Refills: 0 | OUTPATIENT
Start: 2020-06-03

## 2020-06-05 ENCOUNTER — DOCUMENTATION (OUTPATIENT)
Dept: FAMILY MEDICINE CLINIC | Facility: CLINIC | Age: 75
End: 2020-06-05

## 2020-06-05 DIAGNOSIS — N40.1 BENIGN PROSTATIC HYPERPLASIA WITH URINARY FREQUENCY: ICD-10-CM

## 2020-06-05 DIAGNOSIS — R35.0 BENIGN PROSTATIC HYPERPLASIA WITH URINARY FREQUENCY: ICD-10-CM

## 2020-06-05 RX ORDER — TAMSULOSIN HYDROCHLORIDE 0.4 MG/1
0.4 CAPSULE ORAL
Qty: 90 CAPSULE | Refills: 0 | Status: SHIPPED | OUTPATIENT
Start: 2020-06-05 | End: 2020-08-28

## 2020-06-19 DIAGNOSIS — M62.838 MUSCLE SPASM: ICD-10-CM

## 2020-06-19 RX ORDER — CARISOPRODOL 350 MG/1
350 TABLET ORAL 4 TIMES DAILY
Qty: 120 TABLET | Refills: 0 | Status: SHIPPED | OUTPATIENT
Start: 2020-06-19 | End: 2020-07-17 | Stop reason: SDUPTHER

## 2020-06-22 DIAGNOSIS — I10 ESSENTIAL HYPERTENSION: ICD-10-CM

## 2020-06-22 RX ORDER — FUROSEMIDE 40 MG/1
60 TABLET ORAL DAILY
Qty: 135 TABLET | Refills: 0 | Status: SHIPPED | OUTPATIENT
Start: 2020-06-22 | End: 2020-10-05 | Stop reason: SDUPTHER

## 2020-06-24 DIAGNOSIS — G89.4 CHRONIC PAIN SYNDROME: ICD-10-CM

## 2020-06-24 RX ORDER — OXYCODONE HYDROCHLORIDE 30 MG/1
TABLET ORAL
Qty: 180 TABLET | Refills: 0 | Status: SHIPPED | OUTPATIENT
Start: 2020-06-24 | End: 2020-07-21 | Stop reason: SDUPTHER

## 2020-07-12 DIAGNOSIS — I10 ESSENTIAL HYPERTENSION: ICD-10-CM

## 2020-07-13 RX ORDER — AMLODIPINE BESYLATE 2.5 MG/1
TABLET ORAL
Qty: 90 TABLET | Refills: 0 | Status: SHIPPED | OUTPATIENT
Start: 2020-07-13 | End: 2021-01-11

## 2020-07-16 DIAGNOSIS — I10 ESSENTIAL HYPERTENSION: ICD-10-CM

## 2020-07-17 DIAGNOSIS — M62.838 MUSCLE SPASM: ICD-10-CM

## 2020-07-17 RX ORDER — ENALAPRIL MALEATE 20 MG/1
20 TABLET ORAL DAILY
Qty: 90 TABLET | Refills: 2 | Status: SHIPPED | OUTPATIENT
Start: 2020-07-17 | End: 2021-04-16

## 2020-07-17 RX ORDER — CARISOPRODOL 350 MG/1
350 TABLET ORAL 4 TIMES DAILY
Qty: 120 TABLET | Refills: 0 | Status: SHIPPED | OUTPATIENT
Start: 2020-07-17 | End: 2020-08-17

## 2020-07-20 DIAGNOSIS — J44.9 CHRONIC OBSTRUCTIVE PULMONARY DISEASE, UNSPECIFIED COPD TYPE (HCC): ICD-10-CM

## 2020-07-21 DIAGNOSIS — G89.4 CHRONIC PAIN SYNDROME: ICD-10-CM

## 2020-07-21 RX ORDER — OXYCODONE HYDROCHLORIDE 30 MG/1
TABLET ORAL
Qty: 180 TABLET | Refills: 0 | Status: SHIPPED | OUTPATIENT
Start: 2020-07-21 | End: 2020-08-18 | Stop reason: SDUPTHER

## 2020-08-05 DIAGNOSIS — G89.4 CHRONIC PAIN SYNDROME: ICD-10-CM

## 2020-08-05 RX ORDER — NITROGLYCERIN 80 MG/1
1 PATCH TRANSDERMAL DAILY
Qty: 30 PATCH | Refills: 2 | Status: SHIPPED | OUTPATIENT
Start: 2020-08-05 | End: 2020-10-29

## 2020-08-05 NOTE — TELEPHONE ENCOUNTER
Refill    nitroglycerin (NITRODUR) 0 4 mg/hr    Allergies: Gabapentin    [facility]  [phone]

## 2020-08-10 DIAGNOSIS — F41.9 ANXIETY: ICD-10-CM

## 2020-08-10 RX ORDER — LORAZEPAM 0.5 MG/1
TABLET ORAL
Qty: 90 TABLET | Refills: 0 | Status: SHIPPED | OUTPATIENT
Start: 2020-08-10 | End: 2020-09-28 | Stop reason: SDUPTHER

## 2020-08-16 DIAGNOSIS — E78.49 OTHER HYPERLIPIDEMIA: ICD-10-CM

## 2020-08-17 DIAGNOSIS — M62.838 MUSCLE SPASM: ICD-10-CM

## 2020-08-17 RX ORDER — ATORVASTATIN CALCIUM 80 MG/1
TABLET, FILM COATED ORAL
Qty: 90 TABLET | Refills: 0 | Status: SHIPPED | OUTPATIENT
Start: 2020-08-17 | End: 2020-09-04 | Stop reason: SDUPTHER

## 2020-08-17 RX ORDER — CARISOPRODOL 350 MG/1
350 TABLET ORAL 4 TIMES DAILY
Qty: 120 TABLET | Refills: 0 | Status: SHIPPED | OUTPATIENT
Start: 2020-08-17 | End: 2020-09-15 | Stop reason: SDUPTHER

## 2020-08-18 DIAGNOSIS — G89.4 CHRONIC PAIN SYNDROME: ICD-10-CM

## 2020-08-18 RX ORDER — OXYCODONE HYDROCHLORIDE 30 MG/1
TABLET ORAL
Qty: 180 TABLET | Refills: 0 | Status: SHIPPED | OUTPATIENT
Start: 2020-08-18 | End: 2020-09-15 | Stop reason: SDUPTHER

## 2020-08-21 DIAGNOSIS — J44.9 CHRONIC OBSTRUCTIVE PULMONARY DISEASE, UNSPECIFIED COPD TYPE (HCC): ICD-10-CM

## 2020-08-27 DIAGNOSIS — I25.84 CORONARY ATHEROSCLEROSIS DUE TO CALCIFIED CORONARY LESION: ICD-10-CM

## 2020-08-27 DIAGNOSIS — I25.10 CORONARY ATHEROSCLEROSIS DUE TO CALCIFIED CORONARY LESION: ICD-10-CM

## 2020-08-27 DIAGNOSIS — I73.9 PERIPHERAL VASCULAR DISEASE (HCC): ICD-10-CM

## 2020-08-27 DIAGNOSIS — I63.9 RIGHT HEMISPHERE, CEREBRAL INFARCTION (HCC): ICD-10-CM

## 2020-08-27 RX ORDER — CLOPIDOGREL BISULFATE 75 MG/1
75 TABLET ORAL DAILY
Qty: 30 TABLET | Refills: 5 | Status: SHIPPED | OUTPATIENT
Start: 2020-08-27 | End: 2020-09-04 | Stop reason: SDUPTHER

## 2020-08-28 DIAGNOSIS — R35.0 BENIGN PROSTATIC HYPERPLASIA WITH URINARY FREQUENCY: ICD-10-CM

## 2020-08-28 DIAGNOSIS — N40.1 BENIGN PROSTATIC HYPERPLASIA WITH URINARY FREQUENCY: ICD-10-CM

## 2020-08-28 RX ORDER — TAMSULOSIN HYDROCHLORIDE 0.4 MG/1
CAPSULE ORAL
Qty: 90 CAPSULE | Refills: 0 | Status: SHIPPED | OUTPATIENT
Start: 2020-08-28 | End: 2020-12-19

## 2020-09-04 DIAGNOSIS — I25.84 CORONARY ATHEROSCLEROSIS DUE TO CALCIFIED CORONARY LESION: ICD-10-CM

## 2020-09-04 DIAGNOSIS — I63.9 RIGHT HEMISPHERE, CEREBRAL INFARCTION (HCC): ICD-10-CM

## 2020-09-04 DIAGNOSIS — I73.9 PERIPHERAL VASCULAR DISEASE (HCC): ICD-10-CM

## 2020-09-04 DIAGNOSIS — I25.10 CORONARY ATHEROSCLEROSIS DUE TO CALCIFIED CORONARY LESION: ICD-10-CM

## 2020-09-04 DIAGNOSIS — E78.49 OTHER HYPERLIPIDEMIA: ICD-10-CM

## 2020-09-04 RX ORDER — CLOPIDOGREL BISULFATE 75 MG/1
75 TABLET ORAL DAILY
Qty: 30 TABLET | Refills: 5 | Status: SHIPPED | OUTPATIENT
Start: 2020-09-04 | End: 2021-06-17

## 2020-09-04 RX ORDER — ATORVASTATIN CALCIUM 80 MG/1
80 TABLET, FILM COATED ORAL DAILY
Qty: 90 TABLET | Refills: 0 | Status: SHIPPED | OUTPATIENT
Start: 2020-09-04 | End: 2020-12-07 | Stop reason: SDUPTHER

## 2020-09-09 ENCOUNTER — TELEPHONE (OUTPATIENT)
Dept: FAMILY MEDICINE CLINIC | Facility: CLINIC | Age: 75
End: 2020-09-09

## 2020-09-09 NOTE — TELEPHONE ENCOUNTER
That is a medication prescribed by his cardiologist, Dr Cruz Regalado  They should contact their office for a refill

## 2020-09-15 DIAGNOSIS — M62.838 MUSCLE SPASM: ICD-10-CM

## 2020-09-15 DIAGNOSIS — G89.4 CHRONIC PAIN SYNDROME: ICD-10-CM

## 2020-09-15 NOTE — TELEPHONE ENCOUNTER
Pt states he is in a lot of pain more in his feet that is getting worse, pt states he is taking more during the month he do not think it is working so he take a extra tab during the day

## 2020-09-16 ENCOUNTER — TELEMEDICINE (OUTPATIENT)
Dept: FAMILY MEDICINE CLINIC | Facility: CLINIC | Age: 75
End: 2020-09-16
Payer: MEDICARE

## 2020-09-16 VITALS
SYSTOLIC BLOOD PRESSURE: 140 MMHG | WEIGHT: 197 LBS | TEMPERATURE: 97.8 F | HEIGHT: 70 IN | BODY MASS INDEX: 28.2 KG/M2 | DIASTOLIC BLOOD PRESSURE: 65 MMHG

## 2020-09-16 DIAGNOSIS — F17.210 CIGARETTE NICOTINE DEPENDENCE WITHOUT COMPLICATION: ICD-10-CM

## 2020-09-16 DIAGNOSIS — I73.9 PERIPHERAL VASCULAR DISEASE (HCC): Primary | ICD-10-CM

## 2020-09-16 DIAGNOSIS — I25.5 ISCHEMIC CARDIOMYOPATHY: ICD-10-CM

## 2020-09-16 DIAGNOSIS — G89.4 CHRONIC PAIN SYNDROME: ICD-10-CM

## 2020-09-16 PROCEDURE — 99214 OFFICE O/P EST MOD 30 MIN: CPT | Performed by: FAMILY MEDICINE

## 2020-09-16 RX ORDER — OXYCODONE HYDROCHLORIDE 30 MG/1
TABLET ORAL
Qty: 180 TABLET | Refills: 0 | Status: SHIPPED | OUTPATIENT
Start: 2020-09-16 | End: 2020-09-16 | Stop reason: SDUPTHER

## 2020-09-16 RX ORDER — CARISOPRODOL 350 MG/1
350 TABLET ORAL 4 TIMES DAILY
Qty: 120 TABLET | Refills: 0 | Status: SHIPPED | OUTPATIENT
Start: 2020-09-16 | End: 2020-10-21

## 2020-09-16 RX ORDER — OXYCODONE HYDROCHLORIDE 30 MG/1
TABLET ORAL
Qty: 180 TABLET | Refills: 0 | Status: SHIPPED | OUTPATIENT
Start: 2020-09-16 | End: 2020-10-12 | Stop reason: SDUPTHER

## 2020-09-16 RX ORDER — AMIODARONE HYDROCHLORIDE 100 MG/1
TABLET ORAL
COMMUNITY
Start: 2020-09-10 | End: 2022-05-31 | Stop reason: ALTCHOICE

## 2020-09-16 NOTE — PROGRESS NOTES
Assessment/Plan:      Diagnoses and all orders for this visit:    Peripheral vascular disease ([facility] [address] )  Comments:  consider changing to patch form of pain control   Orders:  -     Ambulatory referral to Pain Management; Future    Chronic pain syndrome  -     oxyCODONE (ROXICODONE) 30 MG immediate release tablet; 1 tab every 4 hours as needed for pain  -     Ambulatory referral to Pain Management; Future    Cigarette nicotine dependence without complication  Comments:  discussed smoking cessation, pt not interested in quitting smoking    Ischemic cardiomyopathy  Comments:  keep follow up with cardiology    Other orders  -     amiodarone 100 mg tablet          Subjective:  Chief Complaint   Patient presents with    Follow-up     DISCUSS PROBLEM WITH HIS PAIN MEDICATION  PT IS RUNNING SHORT ON HIS PAIN MEDICATION DUE TO TAKING MORE THAN RX'D  PT INFORMED HE IS DUE FOR AWV , BW AND DM SCREENING         Patient ID: Sal Keenan  is a 77 y o  male  Pt is here in the office today with his son  Pt is here to follow up on chronic conditions  He complains of increased pain in his legs and feet and states he has to take additional tablets when the pain is bad and he is out of his medication for the past 2 days  He has peripheral vascular disease and is not a candidate for surgery due to his chronic heart and lung conditions  He continues to smoke and is not interested in quitting  He denies any recent illnesses  We discussed the use of his medication to cut back on the medication when the pain is not as severe  Pt also is using the nitroglycerin patch with some relief  Pt has not been interested in the past for an evaluation with pain management to discuss alternative medications including a patch form of pain medication, has difficulty getting to the doctor, has to ask for help from his children to drive him to the doctors appointments   With his son in the office today, he is willing to schedule an evaluation to discuss treatment and we discussed that I will be willing to help with the treatment plan and prescribe medications to help with his chronic pain  Discussed that smoking cessation will also help with pain  Review of Systems   Constitutional: Negative  Negative for fatigue and fever  HENT: Negative  Eyes: Negative  Respiratory: Negative  Negative for cough  Cardiovascular: Negative  Gastrointestinal: Negative  Endocrine: Negative  Genitourinary: Negative  Musculoskeletal: Positive for back pain, gait problem and myalgias  Back pain and bilateral leg pain   Skin: Negative  Allergic/Immunologic: Negative  Psychiatric/Behavioral: Negative  The following portions of the patient's history were reviewed and updated as appropriate: allergies, current medications, past family history, past medical history, past social history, past surgical history and problem list     Objective:  Vitals:    09/16/20 1204 09/16/20 1241   BP: 140/65    Temp:  97 8 °F (36 6 °C)   Weight: 89 4 kg (197 lb)    Height: 5' 9 5" (1 765 m)       Physical Exam  Vitals signs and nursing note reviewed  Constitutional:       General: He is not in acute distress  Appearance: He is well-developed  Comments: Frail appearing   HENT:      Head: Normocephalic and atraumatic  Cardiovascular:      Rate and Rhythm: Normal rate and regular rhythm  Heart sounds: Normal heart sounds  Pulmonary:      Effort: Pulmonary effort is normal       Breath sounds: Normal breath sounds  Abdominal:      General: Bowel sounds are normal       Palpations: Abdomen is soft  Comments: Umbilical hernia   Skin:     General: Skin is warm and dry  Neurological:      Mental Status: He is alert and oriented to person, place, and time  Psychiatric:         Mood and Affect: Mood normal          Behavior: Behavior normal          Thought Content:  Thought content normal          Judgment: Judgment normal  BMI Counseling: Body mass index is 28 67 kg/m²  The BMI is above normal  No BMI follow-up plan is appropriate  Patient is 73 years old and weight reduction/weight gain would further complicate their underlying illness

## 2020-09-28 DIAGNOSIS — F41.9 ANXIETY: ICD-10-CM

## 2020-09-28 RX ORDER — LORAZEPAM 0.5 MG/1
0.5 TABLET ORAL EVERY 8 HOURS PRN
Qty: 90 TABLET | Refills: 0 | Status: SHIPPED | OUTPATIENT
Start: 2020-09-28 | End: 2020-12-28 | Stop reason: SDUPTHER

## 2020-10-02 DIAGNOSIS — J44.9 CHRONIC OBSTRUCTIVE PULMONARY DISEASE, UNSPECIFIED COPD TYPE (HCC): ICD-10-CM

## 2020-10-05 DIAGNOSIS — I10 ESSENTIAL HYPERTENSION: ICD-10-CM

## 2020-10-06 RX ORDER — FUROSEMIDE 40 MG/1
60 TABLET ORAL DAILY
Qty: 135 TABLET | Refills: 0 | Status: SHIPPED | OUTPATIENT
Start: 2020-10-06 | End: 2021-01-11

## 2020-10-12 DIAGNOSIS — G89.4 CHRONIC PAIN SYNDROME: ICD-10-CM

## 2020-10-13 RX ORDER — OXYCODONE HYDROCHLORIDE 30 MG/1
TABLET ORAL
Qty: 180 TABLET | Refills: 0 | Status: SHIPPED | OUTPATIENT
Start: 2020-10-13 | End: 2020-11-10 | Stop reason: ALTCHOICE

## 2020-10-20 ENCOUNTER — CONSULT (OUTPATIENT)
Dept: PAIN MEDICINE | Facility: CLINIC | Age: 75
End: 2020-10-20
Payer: MEDICARE

## 2020-10-20 ENCOUNTER — TELEPHONE (OUTPATIENT)
Dept: FAMILY MEDICINE CLINIC | Facility: CLINIC | Age: 75
End: 2020-10-20

## 2020-10-20 VITALS
HEIGHT: 70 IN | BODY MASS INDEX: 27.92 KG/M2 | SYSTOLIC BLOOD PRESSURE: 125 MMHG | WEIGHT: 195 LBS | TEMPERATURE: 97.6 F | DIASTOLIC BLOOD PRESSURE: 70 MMHG

## 2020-10-20 DIAGNOSIS — Z79.891 ENCOUNTER FOR LONG-TERM OPIATE ANALGESIC USE: ICD-10-CM

## 2020-10-20 DIAGNOSIS — G89.4 CHRONIC PAIN SYNDROME: ICD-10-CM

## 2020-10-20 DIAGNOSIS — E11.51 DM (DIABETES MELLITUS), TYPE 2 WITH PERIPHERAL VASCULAR COMPLICATIONS (HCC): ICD-10-CM

## 2020-10-20 DIAGNOSIS — I73.9 PERIPHERAL VASCULAR DISEASE (HCC): ICD-10-CM

## 2020-10-20 DIAGNOSIS — F11.20 UNCOMPLICATED OPIOID DEPENDENCE (HCC): Primary | ICD-10-CM

## 2020-10-20 PROCEDURE — 99205 OFFICE O/P NEW HI 60 MIN: CPT | Performed by: ANESTHESIOLOGY

## 2020-10-20 RX ORDER — OXYCODONE 9 MG/1
CAPSULE, EXTENDED RELEASE ORAL
Refills: 0 | OUTPATIENT
Start: 2020-10-20

## 2020-10-20 RX ORDER — BUPRENORPHINE 5 UG/H
1 PATCH TRANSDERMAL
Qty: 4 PATCH | Refills: 0 | Status: SHIPPED | OUTPATIENT
Start: 2020-10-20 | End: 2020-10-20

## 2020-10-20 RX ORDER — NALOXONE HYDROCHLORIDE 4 MG/.1ML
SPRAY NASAL
Qty: 1 EACH | Refills: 1 | Status: SHIPPED | OUTPATIENT
Start: 2020-10-20

## 2020-10-20 RX ORDER — OXYCODONE 9 MG/1
1 CAPSULE, EXTENDED RELEASE ORAL EVERY 12 HOURS SCHEDULED
Qty: 60 EACH | Refills: 0 | Status: SHIPPED | OUTPATIENT
Start: 2020-10-20 | End: 2022-05-31 | Stop reason: ALTCHOICE

## 2020-10-21 DIAGNOSIS — M62.838 MUSCLE SPASM: ICD-10-CM

## 2020-10-21 DIAGNOSIS — I63.9 RIGHT HEMISPHERE, CEREBRAL INFARCTION (HCC): ICD-10-CM

## 2020-10-21 DIAGNOSIS — I73.9 PERIPHERAL VASCULAR DISEASE (HCC): ICD-10-CM

## 2020-10-21 DIAGNOSIS — I25.84 CORONARY ATHEROSCLEROSIS DUE TO CALCIFIED CORONARY LESION: ICD-10-CM

## 2020-10-21 DIAGNOSIS — I25.10 CORONARY ATHEROSCLEROSIS DUE TO CALCIFIED CORONARY LESION: ICD-10-CM

## 2020-10-21 RX ORDER — CARISOPRODOL 350 MG/1
350 TABLET ORAL 4 TIMES DAILY
Qty: 120 TABLET | Refills: 0 | Status: SHIPPED | OUTPATIENT
Start: 2020-10-21 | End: 2020-11-10 | Stop reason: SDUPTHER

## 2020-10-22 LAB
AMPHET SAL QL CFM: NEGATIVE NG/ML
BUPRENORPHINE SAL QL SCN: NEGATIVE NG/ML
CARBOXYTHC SAL QL CFM: NEGATIVE NG/ML
CCP IGG SERPL-ACNC: NEGATIVE
COCAINE SAL QL CFM: NEGATIVE NG/ML
CODEINE SAL QL CFM: NEGATIVE NG/ML
EDDP SAL QL CFM: NEGATIVE NG/ML
HYDROCODONE SAL QL CFM: NEGATIVE NG/ML
HYDROCODONE SAL QL CFM: NEGATIVE NG/ML
HYDROMORPHONE SAL QL CFM: NEGATIVE NG/ML
LEUKEMIA MARKERS BLD-IMP: NEGATIVE NG/ML
M PROTEIN 3 UR ELPH-MCNC: ABNORMAL NG/ML
M TB TUBERC IGNF/MITOGEN IGNF CONTROL: NEGATIVE NG/ML
METHADONE SAL QL CFM: NEGATIVE NG/ML
MORPHINE SAL QL CFM: NEGATIVE NG/ML
MORPHINE SAL QL CFM: NEGATIVE NG/ML
OXYMORPHONE SAL QL CFM: ABNORMAL NG/ML
OXYMORPHONE SAL QL CFM: ABNORMAL NG/ML
SL AMB 6-MAM (HEROIN METABOLITE) QUANTIFICATION: NEGATIVE NG/ML
SL AMB ALPRAZOLAM QUANTIFICATION: NEGATIVE NG/ML
SL AMB ATOMOXETINE METABOLITE QUANTIFICATION: NEGATIVE
SL AMB ATOMOXETINE QUANTIFICATION: NEGATIVE
SL AMB CARISOPRODOL QUANTIFICATION: ABNORMAL NG/ML
SL AMB CLONAZEPAM QUANTIFICATION: NEGATIVE NG/ML
SL AMB DIAZEPAM QUANTIFICATION: NEGATIVE NG/ML
SL AMB FENTANYL QUANTIFICATION: NEGATIVE NG/ML
SL AMB MDMA QUANTIFICATION: NEGATIVE NG/ML
SL AMB MEPROBAMATE QUANTIFICATION: ABNORMAL NG/ML
SL AMB N-DESMETHYL-TRAMADOL QUANTIFICATION SALIVA: NEGATIVE NG/ML
SL AMB NORBUPRENORPHINE QUANTIFICATION: NEGATIVE NG/ML
SL AMB NORDIAZEPAM QUANTIFICATION: NEGATIVE NG/ML
SL AMB NORFENTANYL QUANTIFICATION: NEGATIVE NG/ML
SL AMB NORHYDROCODONE QUANTIFICATION: NEGATIVE NG/ML
SL AMB NORHYDROCODONE QUANTIFICATION: NEGATIVE NG/ML
SL AMB NORMEPERIDINE QUANTIFICATION: NEGATIVE NG/ML
SL AMB NOROXYCODONE QUANTIFICATION: ABNORMAL NG/ML
SL AMB O-DESMETHYL-TRAMADOL QUANTIFICATION: NEGATIVE NG/ML
SL AMB OXAZEPAM QUANTIFICATION: NEGATIVE NG/ML
SL AMB RITALINIC ACID QUANTIFICATION: NEGATIVE
SL AMB TEMAZEPAM QUANTIFICATION: NEGATIVE NG/ML
SL AMB TEMAZEPAM QUANTIFICATION: NEGATIVE NG/ML
SL AMB TRAMADOL QUANTIFICATION: NEGATIVE NG/ML
SQUAMOUS #/AREA URNS HPF: NEGATIVE NG/ML

## 2020-10-29 DIAGNOSIS — G89.4 CHRONIC PAIN SYNDROME: ICD-10-CM

## 2020-10-29 RX ORDER — NITROGLYCERIN 80 MG/1
PATCH TRANSDERMAL
Qty: 90 PATCH | Refills: 0 | Status: SHIPPED | OUTPATIENT
Start: 2020-10-29 | End: 2021-06-02 | Stop reason: SDUPTHER

## 2020-11-10 ENCOUNTER — TELEPHONE (OUTPATIENT)
Dept: PAIN MEDICINE | Facility: CLINIC | Age: 75
End: 2020-11-10

## 2020-11-10 ENCOUNTER — TELEMEDICINE (OUTPATIENT)
Dept: FAMILY MEDICINE CLINIC | Facility: CLINIC | Age: 75
End: 2020-11-10
Payer: MEDICARE

## 2020-11-10 DIAGNOSIS — I73.9 PERIPHERAL VASCULAR DISEASE (HCC): ICD-10-CM

## 2020-11-10 DIAGNOSIS — M62.838 MUSCLE SPASM: ICD-10-CM

## 2020-11-10 DIAGNOSIS — G89.4 CHRONIC PAIN SYNDROME: ICD-10-CM

## 2020-11-10 DIAGNOSIS — G89.29 OTHER CHRONIC PAIN: Primary | ICD-10-CM

## 2020-11-10 DIAGNOSIS — Z79.899 OTHER LONG TERM (CURRENT) DRUG THERAPY: ICD-10-CM

## 2020-11-10 PROCEDURE — 99442 PR PHYS/QHP TELEPHONE EVALUATION 11-20 MIN: CPT | Performed by: FAMILY MEDICINE

## 2020-11-10 RX ORDER — OXYCODONE HYDROCHLORIDE 15 MG/1
15 TABLET ORAL EVERY 8 HOURS PRN
Qty: 30 TABLET | Refills: 0 | Status: SHIPPED | OUTPATIENT
Start: 2020-11-10 | End: 2020-11-25

## 2020-11-10 RX ORDER — NALOXONE HYDROCHLORIDE 4 MG/.1ML
SPRAY NASAL
Qty: 1 EACH | Refills: 1 | OUTPATIENT
Start: 2020-11-10

## 2020-11-10 RX ORDER — CARISOPRODOL 350 MG/1
350 TABLET ORAL 4 TIMES DAILY
Qty: 120 TABLET | Refills: 0 | Status: SHIPPED | OUTPATIENT
Start: 2020-11-10 | End: 2020-12-09 | Stop reason: SDUPTHER

## 2020-11-10 RX ORDER — NALOXONE HYDROCHLORIDE 4 MG/.1ML
SPRAY NASAL
Qty: 1 EACH | Refills: 1 | Status: SHIPPED | OUTPATIENT
Start: 2020-11-10

## 2020-11-27 ENCOUNTER — OFFICE VISIT (OUTPATIENT)
Dept: FAMILY MEDICINE CLINIC | Facility: CLINIC | Age: 75
End: 2020-11-27
Payer: MEDICARE

## 2020-11-27 VITALS
SYSTOLIC BLOOD PRESSURE: 142 MMHG | HEIGHT: 70 IN | HEART RATE: 63 BPM | TEMPERATURE: 98.7 F | BODY MASS INDEX: 28.13 KG/M2 | DIASTOLIC BLOOD PRESSURE: 60 MMHG | WEIGHT: 196.5 LBS | OXYGEN SATURATION: 98 %

## 2020-11-27 DIAGNOSIS — E11.42 TYPE 2 DIABETES MELLITUS WITH DIABETIC POLYNEUROPATHY, WITHOUT LONG-TERM CURRENT USE OF INSULIN (HCC): ICD-10-CM

## 2020-11-27 DIAGNOSIS — Z23 NEED FOR INFLUENZA VACCINATION: ICD-10-CM

## 2020-11-27 DIAGNOSIS — G89.4 CHRONIC PAIN SYNDROME: ICD-10-CM

## 2020-11-27 DIAGNOSIS — Z00.00 MEDICARE ANNUAL WELLNESS VISIT, SUBSEQUENT: Primary | ICD-10-CM

## 2020-11-27 PROBLEM — I25.10 ATHEROSCLEROTIC HEART DISEASE OF NATIVE CORONARY ARTERY WITHOUT ANGINA PECTORIS: Status: ACTIVE | Noted: 2020-02-12

## 2020-11-27 LAB — SL AMB POCT HEMOGLOBIN AIC: 5.8 (ref ?–6.5)

## 2020-11-27 PROCEDURE — G0439 PPPS, SUBSEQ VISIT: HCPCS

## 2020-11-27 PROCEDURE — 83036 HEMOGLOBIN GLYCOSYLATED A1C: CPT

## 2020-11-27 PROCEDURE — G0008 ADMIN INFLUENZA VIRUS VAC: HCPCS

## 2020-11-27 PROCEDURE — 90662 IIV NO PRSV INCREASED AG IM: CPT

## 2020-11-27 RX ORDER — OXYCODONE HYDROCHLORIDE 30 MG/1
30 TABLET ORAL EVERY 4 HOURS PRN
Qty: 180 TABLET | Refills: 0 | Status: SHIPPED | OUTPATIENT
Start: 2020-11-27 | End: 2020-12-23 | Stop reason: SDUPTHER

## 2020-12-07 DIAGNOSIS — E78.49 OTHER HYPERLIPIDEMIA: ICD-10-CM

## 2020-12-07 RX ORDER — ATORVASTATIN CALCIUM 80 MG/1
80 TABLET, FILM COATED ORAL DAILY
Qty: 90 TABLET | Refills: 0 | Status: SHIPPED | OUTPATIENT
Start: 2020-12-07 | End: 2021-03-01

## 2020-12-09 DIAGNOSIS — M62.838 MUSCLE SPASM: ICD-10-CM

## 2020-12-09 RX ORDER — CARISOPRODOL 350 MG/1
350 TABLET ORAL 4 TIMES DAILY
Qty: 120 TABLET | Refills: 0 | Status: SHIPPED | OUTPATIENT
Start: 2020-12-09 | End: 2021-01-06 | Stop reason: SDUPTHER

## 2020-12-19 DIAGNOSIS — R35.0 BENIGN PROSTATIC HYPERPLASIA WITH URINARY FREQUENCY: ICD-10-CM

## 2020-12-19 DIAGNOSIS — N40.1 BENIGN PROSTATIC HYPERPLASIA WITH URINARY FREQUENCY: ICD-10-CM

## 2020-12-19 RX ORDER — TAMSULOSIN HYDROCHLORIDE 0.4 MG/1
CAPSULE ORAL
Qty: 90 CAPSULE | Refills: 0 | Status: SHIPPED | OUTPATIENT
Start: 2020-12-19 | End: 2021-02-12 | Stop reason: SDUPTHER

## 2020-12-22 DIAGNOSIS — G89.4 CHRONIC PAIN SYNDROME: ICD-10-CM

## 2020-12-23 DIAGNOSIS — G89.4 CHRONIC PAIN SYNDROME: ICD-10-CM

## 2020-12-24 RX ORDER — OXYCODONE HYDROCHLORIDE 30 MG/1
30 TABLET ORAL EVERY 4 HOURS PRN
Qty: 180 TABLET | Refills: 0 | OUTPATIENT
Start: 2020-12-24

## 2020-12-24 RX ORDER — OXYCODONE HYDROCHLORIDE 30 MG/1
30 TABLET ORAL EVERY 4 HOURS PRN
Qty: 180 TABLET | Refills: 0 | Status: SHIPPED | OUTPATIENT
Start: 2020-12-24 | End: 2021-01-18 | Stop reason: SDUPTHER

## 2020-12-28 DIAGNOSIS — F41.9 ANXIETY: ICD-10-CM

## 2020-12-28 RX ORDER — LORAZEPAM 0.5 MG/1
0.5 TABLET ORAL EVERY 8 HOURS PRN
Qty: 90 TABLET | Refills: 0 | Status: SHIPPED | OUTPATIENT
Start: 2020-12-28 | End: 2021-02-03 | Stop reason: SDUPTHER

## 2021-01-06 DIAGNOSIS — M62.838 MUSCLE SPASM: ICD-10-CM

## 2021-01-06 RX ORDER — CARISOPRODOL 350 MG/1
350 TABLET ORAL 4 TIMES DAILY
Qty: 120 TABLET | Refills: 0 | Status: SHIPPED | OUTPATIENT
Start: 2021-01-06 | End: 2021-02-12 | Stop reason: SDUPTHER

## 2021-01-08 DIAGNOSIS — J44.9 CHRONIC OBSTRUCTIVE PULMONARY DISEASE, UNSPECIFIED COPD TYPE (HCC): ICD-10-CM

## 2021-01-09 DIAGNOSIS — I10 ESSENTIAL HYPERTENSION: ICD-10-CM

## 2021-01-11 DIAGNOSIS — I10 ESSENTIAL HYPERTENSION: ICD-10-CM

## 2021-01-11 RX ORDER — FUROSEMIDE 40 MG/1
60 TABLET ORAL DAILY
Qty: 135 TABLET | Refills: 0 | Status: SHIPPED | OUTPATIENT
Start: 2021-01-11 | End: 2021-01-18 | Stop reason: SDUPTHER

## 2021-01-11 RX ORDER — AMLODIPINE BESYLATE 2.5 MG/1
TABLET ORAL
Qty: 90 TABLET | Refills: 0 | Status: SHIPPED | OUTPATIENT
Start: 2021-01-11 | End: 2021-04-12

## 2021-01-14 DIAGNOSIS — G89.4 CHRONIC PAIN SYNDROME: ICD-10-CM

## 2021-01-18 DIAGNOSIS — G89.4 CHRONIC PAIN SYNDROME: ICD-10-CM

## 2021-01-18 DIAGNOSIS — I10 ESSENTIAL HYPERTENSION: ICD-10-CM

## 2021-01-20 RX ORDER — OXYCODONE HYDROCHLORIDE 30 MG/1
30 TABLET ORAL EVERY 4 HOURS PRN
Qty: 180 TABLET | Refills: 0 | Status: SHIPPED | OUTPATIENT
Start: 2021-01-20 | End: 2021-02-15 | Stop reason: SDUPTHER

## 2021-01-20 RX ORDER — FUROSEMIDE 40 MG/1
60 TABLET ORAL DAILY
Qty: 135 TABLET | Refills: 0 | Status: SHIPPED | OUTPATIENT
Start: 2021-01-20 | End: 2021-08-09 | Stop reason: SDUPTHER

## 2021-01-26 RX ORDER — OXYCODONE HYDROCHLORIDE 30 MG/1
30 TABLET ORAL EVERY 4 HOURS PRN
Qty: 180 TABLET | Refills: 0 | OUTPATIENT
Start: 2021-01-26

## 2021-02-03 DIAGNOSIS — F41.9 ANXIETY: ICD-10-CM

## 2021-02-03 RX ORDER — LORAZEPAM 0.5 MG/1
0.5 TABLET ORAL EVERY 8 HOURS PRN
Qty: 90 TABLET | Refills: 0 | Status: SHIPPED | OUTPATIENT
Start: 2021-02-03 | End: 2021-03-01

## 2021-02-12 DIAGNOSIS — G89.4 CHRONIC PAIN SYNDROME: ICD-10-CM

## 2021-02-12 DIAGNOSIS — N40.1 BENIGN PROSTATIC HYPERPLASIA WITH URINARY FREQUENCY: ICD-10-CM

## 2021-02-12 DIAGNOSIS — R35.0 BENIGN PROSTATIC HYPERPLASIA WITH URINARY FREQUENCY: ICD-10-CM

## 2021-02-12 DIAGNOSIS — M62.838 MUSCLE SPASM: ICD-10-CM

## 2021-02-12 RX ORDER — CARISOPRODOL 350 MG/1
350 TABLET ORAL 4 TIMES DAILY
Qty: 120 TABLET | Refills: 0 | Status: CANCELLED | OUTPATIENT
Start: 2021-02-12

## 2021-02-12 RX ORDER — TAMSULOSIN HYDROCHLORIDE 0.4 MG/1
0.4 CAPSULE ORAL
Qty: 90 CAPSULE | Refills: 0 | Status: CANCELLED | OUTPATIENT
Start: 2021-02-12

## 2021-02-12 RX ORDER — OXYCODONE HYDROCHLORIDE 30 MG/1
30 TABLET ORAL EVERY 4 HOURS PRN
Qty: 180 TABLET | Refills: 0 | Status: CANCELLED | OUTPATIENT
Start: 2021-02-12

## 2021-02-12 RX ORDER — TAMSULOSIN HYDROCHLORIDE 0.4 MG/1
0.4 CAPSULE ORAL
Qty: 90 CAPSULE | Refills: 3 | Status: SHIPPED | OUTPATIENT
Start: 2021-02-12 | End: 2021-09-08

## 2021-02-12 RX ORDER — CARISOPRODOL 350 MG/1
350 TABLET ORAL 4 TIMES DAILY
Qty: 120 TABLET | Refills: 0 | Status: SHIPPED | OUTPATIENT
Start: 2021-02-12 | End: 2021-03-12 | Stop reason: SDUPTHER

## 2021-02-15 DIAGNOSIS — G89.4 CHRONIC PAIN SYNDROME: ICD-10-CM

## 2021-02-15 RX ORDER — OXYCODONE HYDROCHLORIDE 30 MG/1
30 TABLET ORAL EVERY 4 HOURS PRN
Qty: 180 TABLET | Refills: 0 | Status: SHIPPED | OUTPATIENT
Start: 2021-02-15 | End: 2021-03-12 | Stop reason: SDUPTHER

## 2021-02-27 DIAGNOSIS — F41.9 ANXIETY: ICD-10-CM

## 2021-03-01 DIAGNOSIS — E78.49 OTHER HYPERLIPIDEMIA: ICD-10-CM

## 2021-03-01 DIAGNOSIS — F41.9 ANXIETY: ICD-10-CM

## 2021-03-01 RX ORDER — LORAZEPAM 0.5 MG/1
TABLET ORAL
Qty: 90 TABLET | Refills: 0 | Status: SHIPPED | OUTPATIENT
Start: 2021-03-01 | End: 2021-04-09 | Stop reason: SDUPTHER

## 2021-03-01 RX ORDER — ATORVASTATIN CALCIUM 80 MG/1
TABLET, FILM COATED ORAL
Qty: 90 TABLET | Refills: 0 | Status: SHIPPED | OUTPATIENT
Start: 2021-03-01 | End: 2021-06-14 | Stop reason: SDUPTHER

## 2021-03-01 RX ORDER — LORAZEPAM 0.5 MG/1
0.5 TABLET ORAL EVERY 8 HOURS PRN
Qty: 90 TABLET | Refills: 0 | OUTPATIENT
Start: 2021-03-01

## 2021-03-12 DIAGNOSIS — M62.838 MUSCLE SPASM: ICD-10-CM

## 2021-03-12 DIAGNOSIS — G89.4 CHRONIC PAIN SYNDROME: ICD-10-CM

## 2021-03-12 RX ORDER — CARISOPRODOL 350 MG/1
350 TABLET ORAL 4 TIMES DAILY
Qty: 120 TABLET | Refills: 0 | Status: SHIPPED | OUTPATIENT
Start: 2021-03-12 | End: 2021-04-14 | Stop reason: SDUPTHER

## 2021-03-12 RX ORDER — OXYCODONE HYDROCHLORIDE 30 MG/1
30 TABLET ORAL EVERY 4 HOURS PRN
Qty: 180 TABLET | Refills: 0 | Status: SHIPPED | OUTPATIENT
Start: 2021-03-12 | End: 2021-04-12 | Stop reason: SDUPTHER

## 2021-03-19 DIAGNOSIS — E78.49 OTHER HYPERLIPIDEMIA: ICD-10-CM

## 2021-03-19 RX ORDER — ATORVASTATIN CALCIUM 80 MG/1
80 TABLET, FILM COATED ORAL DAILY
Qty: 90 TABLET | Refills: 0 | Status: CANCELLED | OUTPATIENT
Start: 2021-03-19

## 2021-04-09 ENCOUNTER — OFFICE VISIT (OUTPATIENT)
Dept: FAMILY MEDICINE CLINIC | Facility: CLINIC | Age: 76
End: 2021-04-09
Payer: MEDICARE

## 2021-04-09 VITALS
BODY MASS INDEX: 25.77 KG/M2 | HEIGHT: 70 IN | TEMPERATURE: 97.8 F | DIASTOLIC BLOOD PRESSURE: 68 MMHG | SYSTOLIC BLOOD PRESSURE: 134 MMHG | HEART RATE: 61 BPM | OXYGEN SATURATION: 99 % | WEIGHT: 180 LBS

## 2021-04-09 DIAGNOSIS — M54.41 ACUTE BILATERAL LOW BACK PAIN WITH BILATERAL SCIATICA: ICD-10-CM

## 2021-04-09 DIAGNOSIS — R53.82 CHRONIC FATIGUE: ICD-10-CM

## 2021-04-09 DIAGNOSIS — E11.42 TYPE 2 DIABETES MELLITUS WITH DIABETIC POLYNEUROPATHY, WITHOUT LONG-TERM CURRENT USE OF INSULIN (HCC): ICD-10-CM

## 2021-04-09 DIAGNOSIS — I50.22 CHRONIC SYSTOLIC (CONGESTIVE) HEART FAILURE (HCC): ICD-10-CM

## 2021-04-09 DIAGNOSIS — M54.42 CHRONIC BILATERAL LOW BACK PAIN WITH BILATERAL SCIATICA: ICD-10-CM

## 2021-04-09 DIAGNOSIS — G89.29 CHRONIC BILATERAL LOW BACK PAIN WITH BILATERAL SCIATICA: ICD-10-CM

## 2021-04-09 DIAGNOSIS — M54.41 CHRONIC BILATERAL LOW BACK PAIN WITH BILATERAL SCIATICA: ICD-10-CM

## 2021-04-09 DIAGNOSIS — Z79.899 LONG-TERM USE OF HIGH-RISK MEDICATION: ICD-10-CM
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
## 2021-04-09 DIAGNOSIS — M54.42 ACUTE BILATERAL LOW BACK PAIN WITH BILATERAL SCIATICA: ICD-10-CM

## 2021-04-09 DIAGNOSIS — I74.9 ARTERIAL EMBOLISM (HCC): ICD-10-CM

## 2021-04-09 DIAGNOSIS — I25.118 CORONARY ARTERY DISEASE OF NATIVE HEART WITH STABLE ANGINA PECTORIS, UNSPECIFIED VESSEL OR LESION TYPE (HCC): ICD-10-CM

## 2021-04-09 DIAGNOSIS — F11.20 UNCOMPLICATED OPIOID DEPENDENCE (HCC): ICD-10-CM

## 2021-04-09 DIAGNOSIS — F41.9 ANXIETY: ICD-10-CM

## 2021-04-09 DIAGNOSIS — I10 ESSENTIAL HYPERTENSION: ICD-10-CM

## 2021-04-09 DIAGNOSIS — J44.9 CHRONIC OBSTRUCTIVE PULMONARY DISEASE, UNSPECIFIED COPD TYPE (HCC): ICD-10-CM

## 2021-04-09 DIAGNOSIS — N18.4 CHRONIC KIDNEY DISEASE, STAGE 4 (SEVERE) (HCC): ICD-10-CM

## 2021-04-09 DIAGNOSIS — E78.5 HYPERLIPIDEMIA, UNSPECIFIED HYPERLIPIDEMIA TYPE: ICD-10-CM

## 2021-04-09 DIAGNOSIS — F41.1 GENERALIZED ANXIETY DISORDER: ICD-10-CM

## 2021-04-09 PROCEDURE — 99214 OFFICE O/P EST MOD 30 MIN: CPT | Performed by: FAMILY MEDICINE

## 2021-04-09 RX ORDER — LORAZEPAM 0.5 MG/1
0.5 TABLET ORAL EVERY 8 HOURS PRN
Qty: 90 TABLET | Refills: 0 | Status: SHIPPED | OUTPATIENT
Start: 2021-04-09 | End: 2021-05-21 | Stop reason: SDUPTHER

## 2021-04-09 RX ORDER — OXYCODONE HYDROCHLORIDE 30 MG/1
30 TABLET ORAL EVERY 4 HOURS PRN
Qty: 20 TABLET | Refills: 0 | Status: SHIPPED | OUTPATIENT
Start: 2021-04-09 | End: 2021-04-12 | Stop reason: DRUGHIGH

## 2021-04-09 NOTE — PROGRESS NOTES
Assessment/Plan:      Diagnoses and all orders for this visit:    Injury due to fall, initial encounter  -     oxyCODONE (ROXICODONE) 30 MG immediate release tablet; Take 1 tablet (30 mg total) by mouth every 4 (four) hours as needed for moderate painMax Daily Amount: 180 mg    Acute bilateral low back pain with bilateral sciatica  -     oxyCODONE (ROXICODONE) 30 MG immediate release tablet; Take 1 tablet (30 mg total) by mouth every 4 (four) hours as needed for moderate painMax Daily Amount: 180 mg    Type 2 diabetes mellitus with diabetic polyneuropathy, without long-term current use of insulin (HCC)  -     Comprehensive metabolic panel; Future    Chronic bilateral low back pain with bilateral sciatica  -     CBC and differential; Future  -     Comprehensive metabolic panel; Future  -     TSH, 3rd generation with Free T4 reflex; Future    Long-term use of high-risk medication  -     CBC and differential; Future  -     Comprehensive metabolic panel; Future  -     TSH, 3rd generation with Free T4 reflex; Future    Anxiety  -     LORazepam (ATIVAN) 0 5 mg tablet; Take 1 tablet (0 5 mg total) by mouth every 8 (eight) hours as needed for anxiety    Chronic kidney disease, stage 4 (severe) (HCC)  -     Comprehensive metabolic panel; Future    Essential hypertension  -     CBC and differential; Future  -     Comprehensive metabolic panel; Future  -     Lipid Panel with Direct LDL reflex; Future  -     TSH, 3rd generation with Free T4 reflex; Future    Hyperlipidemia, unspecified hyperlipidemia type  -     Lipid Panel with Direct LDL reflex; Future    Chronic fatigue  -     CBC and differential; Future  -     Comprehensive metabolic panel; Future  -     TSH, 3rd generation with Free T4 reflex; Future    Generalized anxiety disorder  -     CBC and differential; Future  -     Comprehensive metabolic panel; Future  -     TSH, 3rd generation with Free T4 reflex;  Future    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HCC)    Chronic obstructive pulmonary disease, unspecified COPD type ([facility] )    Chronic systolic (congestive) heart failure (HCC)    Uncomplicated opioid dependence ([facility] )    Coronary artery disease of native heart with stable angina pectoris, unspecified vessel or lesion type ([facility] )    Other orders  -     Cancel: PSA, total and free; Future          Subjective:  Chief Complaint   Patient presents with    Back Pain     pt states he fell 2 times 3 weeks ago, and the 2nd time he fell he hurt his back, pt states he just fell out of nowhere having a lot of back pain  pt states he is using heating patchs and took some oxycodone for the pain but now he's out of med  Patient ID: Jovana Izquierdo  is a 77 y o  male  Pt has fallen 2 times and one walking from kitchen to bedroom  Landed on butt and back  The second episode was getting up to go the the bathroom and fell against bedframe and hit tailbone  Does not remember hitting his head  No loss of consciousness  No hip pain  Seen by chiropractor and did not do any adjustments  2 weeks ago was last fall  Review of Systems   Constitutional: Negative  Negative for fatigue and fever  HENT: Negative  Eyes: Negative  Respiratory: Negative  Negative for cough  Cardiovascular: Negative  Gastrointestinal: Negative  Endocrine: Negative  Genitourinary: Negative  Musculoskeletal: Positive for back pain, gait problem and myalgias  Skin: Negative  Allergic/Immunologic: Negative  Psychiatric/Behavioral: Negative  The following portions of the patient's history were reviewed and updated as appropriate: allergies, current medications, past family history, past medical history, past social history, past surgical history and problem list     Objective:  Vitals:    04/09/21 1036   BP: 134/68   Pulse: 61   Temp: 97 8 °F (36 6 °C)   SpO2: 99%   Weight: 81 6 kg (180 lb)   Height: 5' 9 5" (1 765 m)      Physical Exam  Vitals signs and nursing note reviewed  Constitutional:       General: He is not in acute distress  Appearance: He is well-developed  He is not toxic-appearing  HENT:      Head: Normocephalic and atraumatic  Cardiovascular:      Rate and Rhythm: Normal rate and regular rhythm  Heart sounds: Normal heart sounds  Pulmonary:      Effort: Pulmonary effort is normal       Breath sounds: Normal breath sounds  Abdominal:      General: Bowel sounds are normal       Palpations: Abdomen is soft  Musculoskeletal:         General: Tenderness and signs of injury present  No swelling or deformity  Comments: Tenderness over sacrum   Skin:     General: Skin is warm and dry  Neurological:      Mental Status: He is alert and oriented to person, place, and time  Psychiatric:         Behavior: Behavior normal          Thought Content:  Thought content normal          Judgment: Judgment normal

## 2021-04-12 DIAGNOSIS — I10 ESSENTIAL HYPERTENSION: ICD-10-CM
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
## 2021-04-12 DIAGNOSIS — M54.41 ACUTE BILATERAL LOW BACK PAIN WITH BILATERAL SCIATICA: ICD-10-CM

## 2021-04-12 DIAGNOSIS — M54.42 ACUTE BILATERAL LOW BACK PAIN WITH BILATERAL SCIATICA: ICD-10-CM

## 2021-04-12 DIAGNOSIS — G89.4 CHRONIC PAIN SYNDROME: ICD-10-CM

## 2021-04-12 RX ORDER — OXYCODONE HYDROCHLORIDE 30 MG/1
30 TABLET ORAL EVERY 4 HOURS PRN
Qty: 180 TABLET | Refills: 0 | Status: SHIPPED | OUTPATIENT
Start: 2021-04-12 | End: 2021-04-13 | Stop reason: SDUPTHER

## 2021-04-12 RX ORDER — OXYCODONE HYDROCHLORIDE 30 MG/1
30 TABLET ORAL EVERY 4 HOURS PRN
Qty: 20 TABLET | Refills: 0 | OUTPATIENT
Start: 2021-04-12

## 2021-04-12 RX ORDER — AMLODIPINE BESYLATE 2.5 MG/1
2.5 TABLET ORAL DAILY
Qty: 90 TABLET | Refills: 0 | Status: SHIPPED | OUTPATIENT
Start: 2021-04-12 | End: 2021-08-07

## 2021-04-12 NOTE — PATIENT INSTRUCTIONS
Renewal of medication  Avoid direct pressure on tailbone  Cool compresses  New contract signed  Due for fasting blood work  Return for hba1c

## 2021-04-12 NOTE — TELEPHONE ENCOUNTER
Can you please send patient's pain med rx to [facility] in [facility] will not have them for a couple of days

## 2021-04-13 DIAGNOSIS — G89.4 CHRONIC PAIN SYNDROME: ICD-10-CM

## 2021-04-13 RX ORDER — OXYCODONE HYDROCHLORIDE 30 MG/1
30 TABLET ORAL EVERY 4 HOURS PRN
Qty: 180 TABLET | Refills: 0 | Status: SHIPPED | OUTPATIENT
Start: 2021-04-13 | End: 2021-05-07 | Stop reason: SDUPTHER

## 2021-04-13 NOTE — TELEPHONE ENCOUNTER
Can you please send to [facility]? Looks like it was sent to [facility] in [facility]

## 2021-04-14 DIAGNOSIS — M62.838 MUSCLE SPASM: ICD-10-CM

## 2021-04-14 RX ORDER — CARISOPRODOL 350 MG/1
350 TABLET ORAL 4 TIMES DAILY
Qty: 120 TABLET | Refills: 0 | Status: SHIPPED | OUTPATIENT
Start: 2021-04-14 | End: 2021-05-14 | Stop reason: SDUPTHER

## 2021-04-16 DIAGNOSIS — I10 ESSENTIAL HYPERTENSION: ICD-10-CM

## 2021-04-16 RX ORDER — ENALAPRIL MALEATE 20 MG/1
TABLET ORAL
Qty: 90 TABLET | Refills: 2 | Status: SHIPPED | OUTPATIENT
Start: 2021-04-16

## 2021-05-07 DIAGNOSIS — G89.4 CHRONIC PAIN SYNDROME: ICD-10-CM

## 2021-05-09 ENCOUNTER — IMMUNIZATIONS (OUTPATIENT)
Dept: FAMILY MEDICINE CLINIC | Facility: HOSPITAL | Age: 76
End: 2021-05-09

## 2021-05-09 DIAGNOSIS — Z23 ENCOUNTER FOR IMMUNIZATION: Primary | ICD-10-CM

## 2021-05-09 PROCEDURE — 91300 SARS-COV-2 / COVID-19 MRNA VACCINE (PFIZER-BIONTECH) 30 MCG: CPT

## 2021-05-09 PROCEDURE — 0001A SARS-COV-2 / COVID-19 MRNA VACCINE (PFIZER-BIONTECH) 30 MCG: CPT

## 2021-05-10 DIAGNOSIS — G89.4 CHRONIC PAIN SYNDROME: ICD-10-CM

## 2021-05-10 RX ORDER — OXYCODONE HYDROCHLORIDE 30 MG/1
30 TABLET ORAL EVERY 4 HOURS PRN
Qty: 180 TABLET | Refills: 0 | Status: SHIPPED | OUTPATIENT
Start: 2021-05-10 | End: 2021-05-10 | Stop reason: SDUPTHER

## 2021-05-10 RX ORDER — OXYCODONE HYDROCHLORIDE 30 MG/1
30 TABLET ORAL EVERY 4 HOURS PRN
Qty: 42 TABLET | Refills: 0 | Status: SHIPPED | OUTPATIENT
Start: 2021-05-10 | End: 2021-05-14 | Stop reason: SDUPTHER

## 2021-05-10 RX ORDER — OXYCODONE HYDROCHLORIDE 30 MG/1
30 TABLET ORAL EVERY 4 HOURS PRN
Qty: 180 TABLET | Refills: 0 | OUTPATIENT
Start: 2021-05-10

## 2021-05-10 NOTE — TELEPHONE ENCOUNTER
Sent 1 week  But they should be able to give him a partial fill then I will have to resend the prescription for the rest

## 2021-05-10 NOTE — TELEPHONE ENCOUNTER
Other reason request has been forwarded to provider: refill cannot be delegated   Duplicate request

## 2021-05-10 NOTE — TELEPHONE ENCOUNTER
Pt wants to know if you can send in one week  The store does not have the quantity that was originally needed      Pt want a call

## 2021-05-14 DIAGNOSIS — M62.838 MUSCLE SPASM: ICD-10-CM

## 2021-05-14 DIAGNOSIS — G89.4 CHRONIC PAIN SYNDROME: ICD-10-CM

## 2021-05-14 RX ORDER — CARISOPRODOL 350 MG/1
350 TABLET ORAL 4 TIMES DAILY
Qty: 120 TABLET | Refills: 0 | Status: SHIPPED | OUTPATIENT
Start: 2021-05-14 | End: 2021-06-10 | Stop reason: SDUPTHER

## 2021-05-14 RX ORDER — OXYCODONE HYDROCHLORIDE 30 MG/1
30 TABLET ORAL EVERY 4 HOURS PRN
Qty: 180 TABLET | Refills: 0 | Status: SHIPPED | OUTPATIENT
Start: 2021-05-14 | End: 2021-06-10 | Stop reason: SDUPTHER

## 2021-05-21 DIAGNOSIS — F41.9 ANXIETY: ICD-10-CM

## 2021-05-21 RX ORDER — LORAZEPAM 0.5 MG/1
0.5 TABLET ORAL EVERY 8 HOURS PRN
Qty: 90 TABLET | Refills: 0 | Status: SHIPPED | OUTPATIENT
Start: 2021-05-21 | End: 2021-06-16 | Stop reason: SDUPTHER

## 2021-06-02 DIAGNOSIS — G89.4 CHRONIC PAIN SYNDROME: ICD-10-CM

## 2021-06-02 RX ORDER — NITROGLYCERIN 80 MG/1
1 PATCH TRANSDERMAL DAILY
Qty: 90 PATCH | Refills: 0 | Status: SHIPPED | OUTPATIENT
Start: 2021-06-02

## 2021-06-03 DIAGNOSIS — J44.9 CHRONIC OBSTRUCTIVE PULMONARY DISEASE, UNSPECIFIED COPD TYPE (HCC): ICD-10-CM

## 2021-06-03 RX ORDER — BUDESONIDE AND FORMOTEROL FUMARATE DIHYDRATE 160; 4.5 UG/1; UG/1
2 AEROSOL RESPIRATORY (INHALATION) 2 TIMES DAILY
Qty: 10.2 G | Refills: 0 | Status: SHIPPED | OUTPATIENT
Start: 2021-06-03 | End: 2021-09-27 | Stop reason: SDUPTHER

## 2021-06-05 ENCOUNTER — IMMUNIZATIONS (OUTPATIENT)
Dept: FAMILY MEDICINE CLINIC | Facility: HOSPITAL | Age: 76
End: 2021-06-05

## 2021-06-05 DIAGNOSIS — Z23 ENCOUNTER FOR IMMUNIZATION: Primary | ICD-10-CM

## 2021-06-05 PROCEDURE — 91300 SARS-COV-2 / COVID-19 MRNA VACCINE (PFIZER-BIONTECH) 30 MCG: CPT | Performed by: ANESTHESIOLOGY

## 2021-06-05 PROCEDURE — 0002A SARS-COV-2 / COVID-19 MRNA VACCINE (PFIZER-BIONTECH) 30 MCG: CPT | Performed by: ANESTHESIOLOGY

## 2021-06-10 DIAGNOSIS — G89.4 CHRONIC PAIN SYNDROME: ICD-10-CM

## 2021-06-10 DIAGNOSIS — M62.838 MUSCLE SPASM: ICD-10-CM

## 2021-06-11 RX ORDER — OXYCODONE HYDROCHLORIDE 30 MG/1
30 TABLET ORAL EVERY 4 HOURS PRN
Qty: 180 TABLET | Refills: 0 | Status: SHIPPED | OUTPATIENT
Start: 2021-06-11 | End: 2021-07-08 | Stop reason: SDUPTHER

## 2021-06-11 RX ORDER — CARISOPRODOL 350 MG/1
350 TABLET ORAL 4 TIMES DAILY
Qty: 120 TABLET | Refills: 0 | Status: SHIPPED | OUTPATIENT
Start: 2021-06-11 | End: 2021-07-13 | Stop reason: SDUPTHER

## 2021-06-14 DIAGNOSIS — E78.49 OTHER HYPERLIPIDEMIA: ICD-10-CM

## 2021-06-14 RX ORDER — ATORVASTATIN CALCIUM 80 MG/1
80 TABLET, FILM COATED ORAL DAILY
Qty: 90 TABLET | Refills: 0 | Status: SHIPPED | OUTPATIENT
Start: 2021-06-14 | End: 2021-09-08

## 2021-06-16 DIAGNOSIS — F41.9 ANXIETY: ICD-10-CM

## 2021-06-17 RX ORDER — LORAZEPAM 0.5 MG/1
0.5 TABLET ORAL EVERY 8 HOURS PRN
Qty: 90 TABLET | Refills: 0 | Status: SHIPPED | OUTPATIENT
Start: 2021-06-17 | End: 2021-07-13 | Stop reason: SDUPTHER

## 2021-07-08 DIAGNOSIS — G89.4 CHRONIC PAIN SYNDROME: ICD-10-CM

## 2021-07-08 RX ORDER — OXYCODONE HYDROCHLORIDE 30 MG/1
30 TABLET ORAL EVERY 4 HOURS PRN
Qty: 180 TABLET | Refills: 0 | Status: SHIPPED | OUTPATIENT
Start: 2021-07-08 | End: 2021-08-03 | Stop reason: SDUPTHER

## 2021-07-13 DIAGNOSIS — F41.9 ANXIETY: ICD-10-CM

## 2021-07-13 DIAGNOSIS — M62.838 MUSCLE SPASM: ICD-10-CM

## 2021-07-13 RX ORDER — CARISOPRODOL 350 MG/1
350 TABLET ORAL 4 TIMES DAILY
Qty: 120 TABLET | Refills: 0 | Status: SHIPPED | OUTPATIENT
Start: 2021-07-13 | End: 2021-08-06 | Stop reason: SDUPTHER

## 2021-07-13 RX ORDER — LORAZEPAM 0.5 MG/1
0.5 TABLET ORAL EVERY 8 HOURS PRN
Qty: 90 TABLET | Refills: 0 | Status: SHIPPED | OUTPATIENT
Start: 2021-07-13 | End: 2021-08-06 | Stop reason: SDUPTHER

## 2021-08-02 DIAGNOSIS — G89.4 CHRONIC PAIN SYNDROME: ICD-10-CM

## 2021-08-02 RX ORDER — OXYCODONE HYDROCHLORIDE 30 MG/1
30 TABLET ORAL EVERY 4 HOURS PRN
Qty: 180 TABLET | Refills: 0 | OUTPATIENT
Start: 2021-08-02

## 2021-08-03 DIAGNOSIS — G89.4 CHRONIC PAIN SYNDROME: ICD-10-CM

## 2021-08-03 RX ORDER — OXYCODONE HYDROCHLORIDE 30 MG/1
30 TABLET ORAL EVERY 4 HOURS PRN
Qty: 180 TABLET | Refills: 0 | Status: SHIPPED | OUTPATIENT
Start: 2021-08-03 | End: 2021-08-05 | Stop reason: SDUPTHER

## 2021-08-05 DIAGNOSIS — G89.4 CHRONIC PAIN SYNDROME: ICD-10-CM

## 2021-08-06 DIAGNOSIS — F41.9 ANXIETY: ICD-10-CM

## 2021-08-06 DIAGNOSIS — M62.838 MUSCLE SPASM: ICD-10-CM

## 2021-08-06 RX ORDER — LORAZEPAM 0.5 MG/1
0.5 TABLET ORAL EVERY 8 HOURS PRN
Qty: 90 TABLET | Refills: 0 | Status: SHIPPED | OUTPATIENT
Start: 2021-08-06 | End: 2021-08-09 | Stop reason: SDUPTHER

## 2021-08-06 RX ORDER — CARISOPRODOL 350 MG/1
350 TABLET ORAL 4 TIMES DAILY
Qty: 120 TABLET | Refills: 0 | Status: SHIPPED | OUTPATIENT
Start: 2021-08-06 | End: 2021-08-09 | Stop reason: SDUPTHER

## 2021-08-07 DIAGNOSIS — I10 ESSENTIAL HYPERTENSION: ICD-10-CM

## 2021-08-07 RX ORDER — AMLODIPINE BESYLATE 2.5 MG/1
TABLET ORAL
Qty: 90 TABLET | Refills: 0 | Status: SHIPPED | OUTPATIENT
Start: 2021-08-07 | End: 2021-11-01

## 2021-08-07 RX ORDER — OXYCODONE HYDROCHLORIDE 30 MG/1
30 TABLET ORAL EVERY 4 HOURS PRN
Qty: 180 TABLET | Refills: 0 | Status: SHIPPED | OUTPATIENT
Start: 2021-08-07 | End: 2021-08-30 | Stop reason: SDUPTHER

## 2021-08-09 DIAGNOSIS — M62.838 MUSCLE SPASM: ICD-10-CM

## 2021-08-09 DIAGNOSIS — I10 ESSENTIAL HYPERTENSION: ICD-10-CM

## 2021-08-09 DIAGNOSIS — F41.9 ANXIETY: ICD-10-CM

## 2021-08-09 RX ORDER — FUROSEMIDE 40 MG/1
60 TABLET ORAL DAILY
Qty: 135 TABLET | Refills: 0 | Status: SHIPPED | OUTPATIENT
Start: 2021-08-09 | End: 2021-11-01

## 2021-08-09 RX ORDER — LORAZEPAM 0.5 MG/1
0.5 TABLET ORAL EVERY 8 HOURS PRN
Qty: 90 TABLET | Refills: 0 | Status: SHIPPED | OUTPATIENT
Start: 2021-08-09 | End: 2021-09-08 | Stop reason: SDUPTHER

## 2021-08-09 RX ORDER — CARISOPRODOL 350 MG/1
350 TABLET ORAL 4 TIMES DAILY
Qty: 120 TABLET | Refills: 0 | Status: SHIPPED | OUTPATIENT
Start: 2021-08-09 | End: 2021-09-08 | Stop reason: SDUPTHER

## 2021-08-24 DIAGNOSIS — G89.4 CHRONIC PAIN SYNDROME: ICD-10-CM

## 2021-08-24 DIAGNOSIS — M62.838 MUSCLE SPASM: ICD-10-CM

## 2021-08-24 RX ORDER — OXYCODONE HYDROCHLORIDE 30 MG/1
30 TABLET ORAL EVERY 4 HOURS PRN
Qty: 180 TABLET | Refills: 0 | Status: CANCELLED | OUTPATIENT
Start: 2021-08-24

## 2021-08-24 RX ORDER — CARISOPRODOL 350 MG/1
350 TABLET ORAL 4 TIMES DAILY
Qty: 120 TABLET | Refills: 0 | Status: CANCELLED | OUTPATIENT
Start: 2021-08-24

## 2021-08-30 DIAGNOSIS — G89.4 CHRONIC PAIN SYNDROME: ICD-10-CM

## 2021-08-30 DIAGNOSIS — Z79.891 ENCOUNTER FOR LONG-TERM OPIATE ANALGESIC USE: ICD-10-CM

## 2021-08-30 RX ORDER — OXYCODONE 9 MG/1
1 CAPSULE, EXTENDED RELEASE ORAL EVERY 12 HOURS SCHEDULED
Status: CANCELLED | OUTPATIENT
Start: 2021-08-30

## 2021-09-02 RX ORDER — OXYCODONE HYDROCHLORIDE 30 MG/1
30 TABLET ORAL EVERY 4 HOURS PRN
Qty: 180 TABLET | Refills: 0 | Status: SHIPPED | OUTPATIENT
Start: 2021-09-02 | End: 2021-09-30 | Stop reason: SDUPTHER

## 2021-09-08 DIAGNOSIS — F41.9 ANXIETY: ICD-10-CM

## 2021-09-08 DIAGNOSIS — M62.838 MUSCLE SPASM: ICD-10-CM

## 2021-09-08 DIAGNOSIS — E78.49 OTHER HYPERLIPIDEMIA: ICD-10-CM

## 2021-09-08 RX ORDER — CARISOPRODOL 350 MG/1
350 TABLET ORAL 4 TIMES DAILY
Qty: 120 TABLET | Refills: 0 | Status: SHIPPED | OUTPATIENT
Start: 2021-09-08 | End: 2021-10-08 | Stop reason: SDUPTHER

## 2021-09-08 RX ORDER — LORAZEPAM 0.5 MG/1
0.5 TABLET ORAL EVERY 8 HOURS PRN
Qty: 90 TABLET | Refills: 0 | Status: SHIPPED | OUTPATIENT
Start: 2021-09-08 | End: 2021-09-30 | Stop reason: SDUPTHER

## 2021-09-08 RX ORDER — ATORVASTATIN CALCIUM 80 MG/1
80 TABLET, FILM COATED ORAL DAILY
Qty: 90 TABLET | Refills: 0 | Status: SHIPPED | OUTPATIENT
Start: 2021-09-08 | End: 2021-11-30

## 2021-09-27 DIAGNOSIS — J44.9 CHRONIC OBSTRUCTIVE PULMONARY DISEASE, UNSPECIFIED COPD TYPE (HCC): ICD-10-CM

## 2021-09-27 DIAGNOSIS — F41.9 ANXIETY: ICD-10-CM

## 2021-09-27 DIAGNOSIS — G89.4 CHRONIC PAIN SYNDROME: ICD-10-CM

## 2021-09-27 RX ORDER — BUDESONIDE AND FORMOTEROL FUMARATE DIHYDRATE 160; 4.5 UG/1; UG/1
2 AEROSOL RESPIRATORY (INHALATION) 2 TIMES DAILY
Qty: 10.2 G | Refills: 0 | Status: CANCELLED | OUTPATIENT
Start: 2021-09-27

## 2021-09-27 RX ORDER — BUDESONIDE AND FORMOTEROL FUMARATE DIHYDRATE 160; 4.5 UG/1; UG/1
2 AEROSOL RESPIRATORY (INHALATION) 2 TIMES DAILY
Qty: 10.2 G | Refills: 0 | Status: SHIPPED | OUTPATIENT
Start: 2021-09-27 | End: 2021-10-25

## 2021-09-28 RX ORDER — OXYCODONE HYDROCHLORIDE 30 MG/1
30 TABLET ORAL EVERY 4 HOURS PRN
Qty: 180 TABLET | Refills: 0 | OUTPATIENT
Start: 2021-09-28

## 2021-09-28 RX ORDER — LORAZEPAM 0.5 MG/1
0.5 TABLET ORAL EVERY 8 HOURS PRN
Qty: 90 TABLET | Refills: 0 | OUTPATIENT
Start: 2021-09-28

## 2021-09-30 DIAGNOSIS — G89.4 CHRONIC PAIN SYNDROME: ICD-10-CM

## 2021-09-30 DIAGNOSIS — F41.9 ANXIETY: ICD-10-CM

## 2021-09-30 RX ORDER — OXYCODONE HYDROCHLORIDE 30 MG/1
30 TABLET ORAL EVERY 4 HOURS PRN
Qty: 180 TABLET | Refills: 0 | Status: SHIPPED | OUTPATIENT
Start: 2021-09-30 | End: 2021-10-20 | Stop reason: SDUPTHER

## 2021-09-30 RX ORDER — LORAZEPAM 0.5 MG/1
0.5 TABLET ORAL EVERY 8 HOURS PRN
Qty: 90 TABLET | Refills: 0 | Status: SHIPPED | OUTPATIENT
Start: 2021-09-30 | End: 2021-11-02 | Stop reason: SDUPTHER

## 2021-10-01 ENCOUNTER — TELEPHONE (OUTPATIENT)
Dept: FAMILY MEDICINE CLINIC | Facility: CLINIC | Age: 76
End: 2021-10-01

## 2021-10-03 DIAGNOSIS — J44.9 CHRONIC OBSTRUCTIVE PULMONARY DISEASE, UNSPECIFIED COPD TYPE (HCC): ICD-10-CM

## 2021-10-04 ENCOUNTER — TELEPHONE (OUTPATIENT)
Dept: FAMILY MEDICINE CLINIC | Facility: CLINIC | Age: 76
End: 2021-10-04

## 2021-10-04 DIAGNOSIS — J44.9 CHRONIC OBSTRUCTIVE PULMONARY DISEASE, UNSPECIFIED COPD TYPE (HCC): ICD-10-CM

## 2021-10-08 DIAGNOSIS — M62.838 MUSCLE SPASM: ICD-10-CM

## 2021-10-08 RX ORDER — CARISOPRODOL 350 MG/1
350 TABLET ORAL 4 TIMES DAILY
Qty: 120 TABLET | Refills: 0 | Status: SHIPPED | OUTPATIENT
Start: 2021-10-08 | End: 2021-11-02 | Stop reason: SDUPTHER

## 2021-10-11 DIAGNOSIS — G89.4 CHRONIC PAIN SYNDROME: ICD-10-CM

## 2021-10-11 RX ORDER — OXYCODONE HYDROCHLORIDE 30 MG/1
30 TABLET ORAL EVERY 4 HOURS PRN
Qty: 180 TABLET | Refills: 0 | OUTPATIENT
Start: 2021-10-11

## 2021-10-20 ENCOUNTER — OFFICE VISIT (OUTPATIENT)
Dept: FAMILY MEDICINE CLINIC | Facility: CLINIC | Age: 76
End: 2021-10-20
Payer: MEDICARE

## 2021-10-20 VITALS
TEMPERATURE: 96.7 F | HEART RATE: 60 BPM | HEIGHT: 70 IN | WEIGHT: 175 LBS | OXYGEN SATURATION: 98 % | BODY MASS INDEX: 25.05 KG/M2 | SYSTOLIC BLOOD PRESSURE: 135 MMHG | DIASTOLIC BLOOD PRESSURE: 60 MMHG

## 2021-10-20 DIAGNOSIS — Z79.899 LONG-TERM USE OF HIGH-RISK MEDICATION: ICD-10-CM

## 2021-10-20 DIAGNOSIS — M54.42 CHRONIC BILATERAL LOW BACK PAIN WITH BILATERAL SCIATICA: ICD-10-CM

## 2021-10-20 DIAGNOSIS — G89.29 CHRONIC BILATERAL LOW BACK PAIN WITH BILATERAL SCIATICA: ICD-10-CM

## 2021-10-20 DIAGNOSIS — F17.210 CIGARETTE NICOTINE DEPENDENCE WITHOUT COMPLICATION: ICD-10-CM

## 2021-10-20 DIAGNOSIS — G89.4 CHRONIC PAIN SYNDROME: ICD-10-CM

## 2021-10-20 DIAGNOSIS — I10 ESSENTIAL HYPERTENSION: ICD-10-CM

## 2021-10-20 DIAGNOSIS — M54.41 CHRONIC BILATERAL LOW BACK PAIN WITH BILATERAL SCIATICA: ICD-10-CM

## 2021-10-20 DIAGNOSIS — L02.31 CELLULITIS AND ABSCESS OF BUTTOCK: ICD-10-CM

## 2021-10-20 DIAGNOSIS — L03.317 CELLULITIS AND ABSCESS OF BUTTOCK: ICD-10-CM

## 2021-10-20 DIAGNOSIS — Z23 NEED FOR VACCINATION: ICD-10-CM

## 2021-10-20 DIAGNOSIS — E11.42 TYPE 2 DIABETES MELLITUS WITH DIABETIC POLYNEUROPATHY, WITHOUT LONG-TERM CURRENT USE OF INSULIN (HCC): Primary | ICD-10-CM

## 2021-10-20 LAB — SL AMB POCT HEMOGLOBIN AIC: 5.4 (ref ?–6.5)

## 2021-10-20 PROCEDURE — G0008 ADMIN INFLUENZA VIRUS VAC: HCPCS

## 2021-10-20 PROCEDURE — 99214 OFFICE O/P EST MOD 30 MIN: CPT | Performed by: FAMILY MEDICINE

## 2021-10-20 PROCEDURE — 90662 IIV NO PRSV INCREASED AG IM: CPT

## 2021-10-20 PROCEDURE — 83036 HEMOGLOBIN GLYCOSYLATED A1C: CPT | Performed by: FAMILY MEDICINE

## 2021-10-20 RX ORDER — OXYCODONE HYDROCHLORIDE 30 MG/1
30 TABLET ORAL EVERY 4 HOURS PRN
Qty: 60 TABLET | Refills: 0 | Status: SHIPPED | OUTPATIENT
Start: 2021-10-20 | End: 2021-10-26 | Stop reason: SDUPTHER

## 2021-10-20 RX ORDER — SULFAMETHOXAZOLE AND TRIMETHOPRIM 800; 160 MG/1; MG/1
TABLET ORAL 2 TIMES DAILY
COMMUNITY
Start: 2021-10-18 | End: 2021-10-28

## 2021-10-23 DIAGNOSIS — J44.9 CHRONIC OBSTRUCTIVE PULMONARY DISEASE, UNSPECIFIED COPD TYPE (HCC): ICD-10-CM

## 2021-10-25 RX ORDER — BUDESONIDE AND FORMOTEROL FUMARATE DIHYDRATE 160; 4.5 UG/1; UG/1
AEROSOL RESPIRATORY (INHALATION)
Qty: 10.2 G | Refills: 1 | Status: SHIPPED | OUTPATIENT
Start: 2021-10-25 | End: 2021-10-28

## 2021-10-26 DIAGNOSIS — G89.4 CHRONIC PAIN SYNDROME: ICD-10-CM

## 2021-10-26 RX ORDER — OXYCODONE HYDROCHLORIDE 30 MG/1
30 TABLET ORAL EVERY 4 HOURS PRN
Qty: 60 TABLET | Refills: 0 | Status: SHIPPED | OUTPATIENT
Start: 2021-10-26 | End: 2021-10-29 | Stop reason: SDUPTHER

## 2021-10-29 DIAGNOSIS — G89.4 CHRONIC PAIN SYNDROME: ICD-10-CM

## 2021-10-29 RX ORDER — OXYCODONE HYDROCHLORIDE 30 MG/1
30 TABLET ORAL EVERY 4 HOURS PRN
Qty: 120 TABLET | Refills: 0 | Status: SHIPPED | OUTPATIENT
Start: 2021-10-29 | End: 2021-11-02 | Stop reason: SDUPTHER

## 2021-11-01 DIAGNOSIS — I10 ESSENTIAL HYPERTENSION: ICD-10-CM

## 2021-11-01 RX ORDER — FUROSEMIDE 40 MG/1
60 TABLET ORAL DAILY
Qty: 135 TABLET | Refills: 0 | Status: SHIPPED | OUTPATIENT
Start: 2021-11-01 | End: 2021-12-02 | Stop reason: SDUPTHER

## 2021-11-01 RX ORDER — AMLODIPINE BESYLATE 2.5 MG/1
TABLET ORAL
Qty: 90 TABLET | Refills: 0 | Status: SHIPPED | OUTPATIENT
Start: 2021-11-01 | End: 2021-12-23 | Stop reason: SDUPTHER

## 2021-11-02 DIAGNOSIS — M62.838 MUSCLE SPASM: ICD-10-CM

## 2021-11-02 DIAGNOSIS — G89.4 CHRONIC PAIN SYNDROME: ICD-10-CM

## 2021-11-02 DIAGNOSIS — F41.9 ANXIETY: ICD-10-CM

## 2021-11-03 RX ORDER — LORAZEPAM 0.5 MG/1
0.5 TABLET ORAL EVERY 8 HOURS PRN
Qty: 90 TABLET | Refills: 0 | Status: SHIPPED | OUTPATIENT
Start: 2021-11-03 | End: 2021-12-20 | Stop reason: SDUPTHER

## 2021-11-03 RX ORDER — CARISOPRODOL 350 MG/1
350 TABLET ORAL 4 TIMES DAILY
Qty: 120 TABLET | Refills: 0 | Status: SHIPPED | OUTPATIENT
Start: 2021-11-03 | End: 2021-12-02 | Stop reason: SDUPTHER

## 2021-11-03 RX ORDER — OXYCODONE HYDROCHLORIDE 30 MG/1
30 TABLET ORAL EVERY 4 HOURS PRN
Qty: 120 TABLET | Refills: 0 | Status: SHIPPED | OUTPATIENT
Start: 2021-11-03 | End: 2021-11-24 | Stop reason: SDUPTHER

## 2021-11-24 DIAGNOSIS — G89.4 CHRONIC PAIN SYNDROME: ICD-10-CM

## 2021-11-25 RX ORDER — OXYCODONE HYDROCHLORIDE 30 MG/1
30 TABLET ORAL EVERY 4 HOURS PRN
Qty: 120 TABLET | Refills: 0 | Status: SHIPPED | OUTPATIENT
Start: 2021-11-25 | End: 2021-12-20 | Stop reason: SDUPTHER

## 2021-11-26 DIAGNOSIS — G89.4 CHRONIC PAIN SYNDROME: ICD-10-CM

## 2021-11-29 RX ORDER — OXYCODONE HYDROCHLORIDE 30 MG/1
30 TABLET ORAL EVERY 4 HOURS PRN
Qty: 120 TABLET | Refills: 0 | OUTPATIENT
Start: 2021-11-29

## 2021-11-30 DIAGNOSIS — E78.49 OTHER HYPERLIPIDEMIA: ICD-10-CM

## 2021-11-30 RX ORDER — ATORVASTATIN CALCIUM 80 MG/1
TABLET, FILM COATED ORAL
Qty: 90 TABLET | Refills: 0 | Status: SHIPPED | OUTPATIENT
Start: 2021-11-30 | End: 2022-03-02

## 2021-12-02 DIAGNOSIS — M62.838 MUSCLE SPASM: ICD-10-CM

## 2021-12-02 DIAGNOSIS — I10 ESSENTIAL HYPERTENSION: ICD-10-CM

## 2021-12-03 RX ORDER — FUROSEMIDE 40 MG/1
60 TABLET ORAL DAILY
Qty: 135 TABLET | Refills: 0 | Status: SHIPPED | OUTPATIENT
Start: 2021-12-03 | End: 2022-02-24

## 2021-12-03 RX ORDER — ATENOLOL 25 MG/1
TABLET ORAL
Qty: 15 TABLET | Refills: 5 | Status: SHIPPED | OUTPATIENT
Start: 2021-12-03 | End: 2022-05-31 | Stop reason: ALTCHOICE

## 2021-12-03 RX ORDER — CARISOPRODOL 350 MG/1
350 TABLET ORAL 4 TIMES DAILY
Qty: 120 TABLET | Refills: 0 | Status: SHIPPED | OUTPATIENT
Start: 2021-12-03 | End: 2022-01-10 | Stop reason: SDUPTHER

## 2021-12-06 ENCOUNTER — OFFICE VISIT (OUTPATIENT)
Dept: FAMILY MEDICINE CLINIC | Facility: CLINIC | Age: 76
End: 2021-12-06
Payer: MEDICARE

## 2021-12-06 VITALS
HEIGHT: 70 IN | BODY MASS INDEX: 25.05 KG/M2 | OXYGEN SATURATION: 93 % | HEART RATE: 61 BPM | SYSTOLIC BLOOD PRESSURE: 100 MMHG | DIASTOLIC BLOOD PRESSURE: 50 MMHG | TEMPERATURE: 97.5 F | WEIGHT: 175 LBS

## 2021-12-06 DIAGNOSIS — L03.317 CELLULITIS AND ABSCESS OF BUTTOCK: ICD-10-CM

## 2021-12-06 DIAGNOSIS — L02.31 CELLULITIS AND ABSCESS OF BUTTOCK: ICD-10-CM

## 2021-12-06 DIAGNOSIS — I10 ESSENTIAL HYPERTENSION: ICD-10-CM

## 2021-12-06 DIAGNOSIS — Z79.899 LONG-TERM USE OF HIGH-RISK MEDICATION: ICD-10-CM

## 2021-12-06 DIAGNOSIS — I47.2 VENTRICULAR TACHYCARDIA (HCC): ICD-10-CM

## 2021-12-06 DIAGNOSIS — Z00.00 MEDICARE ANNUAL WELLNESS VISIT, SUBSEQUENT: Primary | ICD-10-CM

## 2021-12-06 PROCEDURE — G0439 PPPS, SUBSEQ VISIT: HCPCS | Performed by: FAMILY MEDICINE

## 2021-12-06 PROCEDURE — 1123F ACP DISCUSS/DSCN MKR DOCD: CPT | Performed by: FAMILY MEDICINE

## 2021-12-06 RX ORDER — SULFAMETHOXAZOLE AND TRIMETHOPRIM 800; 160 MG/1; MG/1
1 TABLET ORAL EVERY 12 HOURS SCHEDULED
Qty: 20 TABLET | Refills: 0 | Status: SHIPPED | OUTPATIENT
Start: 2021-12-06 | End: 2021-12-16

## 2021-12-06 NOTE — PROGRESS NOTES
Assessment and Plan:     Problem List Items Addressed This Visit        Cardiovascular and Mediastinum    Essential hypertension     Controlled, continue current medication, low blood pressure, denies dizziness, lightheadedness         Ventricular tachycardia ([address] )       Other    Medicare annual wellness visit, subsequent - Primary    Long-term use of high-risk medication     Medication agreement up to date  pdmp reviewed regularly         Cellulitis and abscess of buttock     Culture done today  Start bactrim and home health for wound care         Relevant Orders    Culture, Aerobic Bacteria (Completed)        BMI Counseling: Body mass index is 25 47 kg/m²  The BMI is above normal  Nutrition recommendations include decreasing portion sizes  Exercise recommendations include exercising 3-5 times per week  No pharmacotherapy was ordered  Rationale for BMI follow-up plan is due to patient being overweight or obese  Depression Screening and Follow-up Plan: Patient was screened for depression during today's encounter  They screened negative with a PHQ-2 score of 2  Preventive health issues were discussed with patient, and age appropriate screening tests were ordered as noted in patient's After Visit Summary  Personalized health advice and appropriate referrals for health education or preventive services given if needed, as noted in patient's After Visit Summary       History of Present Illness:     Patient presents for Medicare Annual Wellness visit and has a wound on his buttock    Patient Care Team:  Lissette Bravo DO as PCP - General  JESIKA Yun     Problem List:     Patient Active Problem List   Diagnosis    Acute right lumbar radiculopathy    Chondromalacia of patella, right    Degenerative cervical disc    Chronic bilateral low back pain with bilateral sciatica    Lumbar radiculopathy    Lumbosacral spondylosis    Neural foraminal stenosis of cervical spine    Coronary arteriosclerosis in native artery    Peripheral vascular disease (HCC)    Right hemisphere, cerebral infarction (HCC)    Abnormal weight loss    Acute exacerbation of chronic obstructive bronchitis (HCC)    Allergic rhinitis    Anxiety    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HCC)    Benign prostatic hyperplasia    Chronic bronchitis (HCC)    Stage 3 chronic kidney disease    Chronic obstructive pulmonary disease (HCC)    Chronic pain    Constipation    Type 2 diabetes mellitus with diabetic polyneuropathy, without long-term current use of insulin (HCC)    Edema eyelid    Dysphagia    Esophageal reflux    Fatigue    Hoarseness    Hyperlipidemia    Essential hypertension    Ischemic cardiomyopathy    Kidney cysts    Left foot pain    Lower abdominal pain    Neck pain    Neuralgia    Muscle spasms of neck    Cigarette nicotine dependence without complication    Onychomycosis of toenail    Pain of fifth toe    Other chronic pain    Primary osteoarthritis of right knee    Right foot pain    Right hip pain    Right knee pain    Shortness of breath    Solitary lung nodule    Ulceration    Vallecular mass    Vitamin D deficiency    Iron deficiency anemia secondary to inadequate dietary iron intake    Umbilical hernia    Overweight (BMI 25 0-29  9)    Medicare annual wellness visit, subsequent    Screening for colon cancer    Muscle spasm    Biventricular automatic implantable cardioverter defibrillator in situ    Transient ischemic attack    Chronic systolic (congestive) heart failure (HCC)    Disorder of implanted defibrillator generator    Dyslipidemia    Gallbladder disease    Hypersomnia    Non-rheumatic mitral regurgitation    Old myocardial infarction    Other long term (current) drug therapy    Paroxysmal ventricular tachycardia ([address] )    Personal history of transient ischemic attack (TIA), and cerebral infarction without residual deficits    Tobacco use    Ventricular arrhythmia    Chronic kidney disease, stage 4 (severe) ([facility])    Coronary artery disease of native heart with stable angina pectoris ([facility])    Vasomotor rhinitis    Need for vaccination    Long-term use of high-risk medication    Uncomplicated opioid dependence ([facility] )    Fall with injury    Acute bilateral low back pain with bilateral sciatica    Cellulitis and abscess of buttock    Gallbladder sludge    ICD (implantable cardioverter-defibrillator) battery depletion    On amiodarone therapy    Ventricular tachycardia [facility])      Past Medical and Surgical History:     Past Medical History:   Diagnosis Date    Acute myocardial infarction [facility])     Anxiety     Last Assessed: 3/11/2013    Boil of buttock     Cellulitis of fifth toe, left     Last Assessed: 3/9/2017    Central obesity     Last Assessed: 4/11/2017    Ulcer of toe of left foot ([facility] )     Last Assessed: 3/9/2017     Past Surgical History:   Procedure Laterality Date    APPENDECTOMY      CARDIAC DEFIBRILLATOR PLACEMENT      CORONARY ANGIOPLASTY WITH STENT PLACEMENT      Last Assessed: 8/6/2012    INGUINAL HERNIA REPAIR Left       Family History:     Family History   Problem Relation Age of Onset    Coronary artery disease Brother     Arthritis Family     Heart disease Family     Hypertension Family       Social History:     Social History     Socioeconomic History    Marital status: /Civil Union     Spouse name: Not on file    Number of children: Not on file    Years of education: Not on file    Highest education level: Not on file   Occupational History    Occupation: Retired:    Tobacco Use    Smoking status: Current Every Day Smoker     Packs/day: 1 00    Smokeless tobacco: Never Used    Tobacco comment: 50+ years/1 ppd   Vaping Use    Vaping Use: Never used   Substance and Sexual Activity    Alcohol use: Not Currently     Comment: social    Drug use: No    Sexual activity: Not on file   Other Topics Concern    Not on file   Social History Narrative    Daily caffeinated coffee consumption: 2 cups per day    Exercise: walking 7 days per week    No living will    Well balanced diet     Social Determinants of Health     Financial Resource Strain: Not on file   Food Insecurity: Not on file   Transportation Needs: Not on file   Physical Activity: Not on file   Stress: Not on file   Social Connections: Not on file   Intimate Partner Violence: Not on file   Housing Stability: Not on file      Medications and Allergies:     Current Outpatient Medications   Medication Sig Dispense Refill    amiodarone 100 mg tablet       atenolol (TENORMIN) 25 mg tablet 1/2 tab daily 15 tablet 5    atorvastatin (LIPITOR) 80 mg tablet TAKE 1 TABLET BY MOUTH EVERY DAY 90 tablet 0    enalapril (VASOTEC) 20 mg tablet TAKE 1 TABLET BY MOUTH EVERY DAY 90 tablet 2    famotidine (PEPCID) 20 mg tablet Take 1 tablet by mouth 2 (two) times a day      fluticasone (FLONASE) 50 mcg/act nasal spray SPRAY 2 SPRAYS INTO EACH NOSTRIL EVERY DAY 48 mL 0    furosemide (LASIX) 40 mg tablet Take 1 5 tablets (60 mg total) by mouth daily 135 tablet 0    ipratropium (ATROVENT) 0 06 % nasal spray 2 sprays into each nostril 4 (four) times a day 15 mL 5    lidocaine (XYLOCAINE) 5 % ointment Apply topically as needed for mild pain 50 g 0    naloxone (NARCAN) 4 mg/0 1 mL nasal spray Administer 1 spray into a nostril  If breathing does not return to normal or if breathing difficulty resumes after 2-3 minutes, give another dose in the other nostril using a new spray  1 each 1    naloxone (NARCAN) 4 mg/0 1 mL nasal spray Administer 1 spray into a nostril  If breathing does not return to normal or if breathing difficulty resumes after 2-3 minutes, give another dose in the other nostril using a new spray   1 each 1    nitroglycerin (NITRODUR) 0 4 mg/hr Place 1 patch on the skin daily 90 patch 0    oxyCODONE ER (Xtampza ER) 9 MG C12A Take 1 each by mouth every 12 (twelve) hoursMax Daily Amount: 2 each 60 each 0    Symbicort 160-4 5 MCG/ACT inhaler TAKE 2 PUFFS BY MOUTH TWICE A DAY 30 6 g 3    tamsulosin (FLOMAX) 0 4 mg TAKE 1 CAPSULE BY MOUTH AT BEDTIME 90 capsule 3    amLODIPine (NORVASC) 2 5 mg tablet Take 1 tablet (2 5 mg total) by mouth daily 90 tablet 0    carisoprodol (SOMA) 350 mg tablet Take 1 tablet (350 mg total) by mouth 4 (four) times a day 120 tablet 0    clopidogrel (PLAVIX) 75 mg tablet TAKE 1 TABLET BY MOUTH EVERY DAY 90 tablet 1    LORazepam (ATIVAN) 0 5 mg tablet Take 1 tablet (0 5 mg total) by mouth every 8 (eight) hours as needed for anxiety 90 tablet 0    oxyCODONE (ROXICODONE) 30 MG immediate release tablet Take 1 tablet (30 mg total) by mouth every 4 (four) hours as needed for moderate pain Max Daily Amount: 180 mg 120 tablet 0    Ventolin  (90 Base) MCG/ACT inhaler Inhale 2 puffs every 4 (four) hours as needed for wheezing 18 g 5     No current facility-administered medications for this visit  Allergies   Allergen Reactions    Gabapentin Hallucinations     Category:  Adverse Reaction;       Immunizations:     Immunization History   Administered Date(s) Administered    COVID-19 PFIZER VACCINE 0 3 ML IM 05/09/2021, 06/05/2021    INFLUENZA 11/23/2019    Influenza Split High Dose Preservative Free IM 10/28/2014, 11/23/2015, 10/18/2016, 11/08/2017    Influenza, high dose seasonal 0 7 mL 11/27/2020, 10/20/2021    Pneumococcal Conjugate 13-Valent 10/18/2016    Pneumococcal Polysaccharide PPV23 10/01/2011      Health Maintenance:         Topic Date Due    Hepatitis C Screening  Completed         Topic Date Due    COVID-19 Vaccine (3 - Booster for Pfizer series) 11/05/2021      Medicare Health Risk Assessment:     /50   Pulse 61   Temp 97 5 °F (36 4 °C)   Ht 5' 9 5" (1 765 m)   Wt 79 4 kg (175 lb)   SpO2 93%   BMI 25 47 kg/m²      Last Medicare Wellness visit information reviewed, patient interviewed and updates made to the record today       Health Risk Assessment:   Patient rates overall health as fair  Patient feels that their physical health rating is slightly worse  Patient is dissatisfied with their life  Eyesight was rated as same  Hearing was rated as same  Patient feels that their emotional and mental health rating is slightly worse  Patients states they are never, rarely angry  Patient states they are always unusually tired/fatigued  Pain experienced in the last 7 days has been a lot  Patient's pain rating has been 10/10  Patient states that he has experienced no weight loss or gain in last 6 months  Depression Screening:   PHQ-2 Score: 2      Home Safety:  Patient does not have trouble with stairs inside or outside of their home  Patient has working smoke alarms and has no working carbon monoxide detector  Home safety hazards include: none  Nutrition:   Current diet is Regular  Medications:   Patient is not currently taking any over-the-counter supplements  Patient is not able to manage medications  Activities of Daily Living (ADLs)/Instrumental Activities of Daily Living (IADLs):   Walk and transfer into and out of bed and chair?: Yes  Dress and groom yourself?: Yes    Bathe or shower yourself?: Yes    Feed yourself?  Yes  Do your laundry/housekeeping?: Yes  Manage your money, pay your bills and track your expenses?: Yes  Make your own meals?: Yes    Do your own shopping?: Yes    Previous Hospitalizations:   Any hospitalizations or ED visits within the last 12 months?: No      Advance Care Planning:   Living will: No    Durable POA for healthcare: No      PREVENTIVE SCREENINGS      Cardiovascular Screening:    General: Screening Not Indicated and History Lipid Disorder      Diabetes Screening:     General: Screening Not Indicated and History Diabetes      Prostate Cancer Screening:    General: Screening Not Indicated      Abdominal Aortic Aneurysm (AAA) Screening:    Risk factors include: age between 73-67 yo and tobacco use        Lung Cancer Screening:     General: Screening Not Indicated      Hepatitis C Screening:    General: Screening Current    Screening, Brief Intervention, and Referral to Treatment (SBIRT)    Screening  Typical number of drinks in a day: 0  Typical number of drinks in a week: 0  Interpretation: Low risk drinking behavior      AUDIT-C Screenin) How often did you have a drink containing alcohol in the past year? never  2) How many drinks did you have on a typical day when you were drinking in the past year? 0  3) How often did you have 6 or more drinks on one occasion in the past year? never    AUDIT-C Score: 0  Interpretation: Score 0-3 (male): Negative screen for alcohol misuse    Single Item Drug Screening:  How often have you used an illegal drug (including marijuana) or a prescription medication for non-medical reasons in the past year? never    Single Item Drug Screen Score: 0  Interpretation: Negative screen for possible drug use disorder    Review of Current Opioid Use    Opioid Risk Tool (ORT) Interpretation: Complete Opioid Risk Tool (ORT)      Jamison Stokes, DO

## 2021-12-06 NOTE — PATIENT INSTRUCTIONS
Bactrim 1 tab twice a day  Wound care, home health  Medicare Preventive Visit Patient Instructions  Thank you for completing your Welcome to Medicare Visit or Medicare Annual Wellness Visit today  Your next wellness visit will be due in one year (12/7/2022)  The screening/preventive services that you may require over the next 5-10 years are detailed below  Some tests may not apply to you based off risk factors and/or age  Screening tests ordered at today's visit but not completed yet may show as past due  Also, please note that scanned in results may not display below  Preventive Screenings:  Service Recommendations Previous Testing/Comments   Colorectal Cancer Screening  · Colonoscopy    · Fecal Occult Blood Test (FOBT)/Fecal Immunochemical Test (FIT)  · Fecal DNA/Cologuard Test  · Flexible Sigmoidoscopy Age: 54-65 years old   Colonoscopy: every 10 years (May be performed more frequently if at higher risk)  OR  FOBT/FIT: every 1 year  OR  Cologuard: every 3 years  OR  Sigmoidoscopy: every 5 years  Screening may be recommended earlier than age 48 if at higher risk for colorectal cancer  Also, an individualized decision between you and your healthcare provider will decide whether screening between the ages of 74-80 would be appropriate   Colonoscopy: Not on file  FOBT/FIT: Not on file  Cologuard: Not on file  Sigmoidoscopy: Not on file          Prostate Cancer Screening Individualized decision between patient and health care provider in men between ages of 53-78   Medicare will cover every 12 months beginning on the day after your 50th birthday PSA: 0 4 ng/mL     Screening Not Indicated     Hepatitis C Screening Once for adults born between 1945 and 1965  More frequently in patients at high risk for Hepatitis C Hep C Antibody: 11/09/2017    Screening Current   Diabetes Screening 1-2 times per year if you're at risk for diabetes or have pre-diabetes Fasting glucose: 81 mg/dL   A1C: 5 4    Screening Not Indicated  History Diabetes   Cholesterol Screening Once every 5 years if you don't have a lipid disorder  May order more often based on risk factors  Lipid panel: 10/29/2019    Screening Not Indicated  History Lipid Disorder      Other Preventive Screenings Covered by Medicare:  1  Abdominal Aortic Aneurysm (AAA) Screening: covered once if your at risk  You're considered to be at risk if you have a family history of AAA or a male between the age of 73-68 who smoking at least 100 cigarettes in your lifetime  2  Lung Cancer Screening: covers low dose CT scan once per year if you meet all of the following conditions: (1) Age 50-69; (2) No signs or symptoms of lung cancer; (3) Current smoker or have quit smoking within the last 15 years; (4) You have a tobacco smoking history of at least 30 pack years (packs per day x number of years you smoked); (5) You get a written order from a healthcare provider  3  Glaucoma Screening: covered annually if you're considered high risk: (1) You have diabetes OR (2) Family history of glaucoma OR (3)  aged 48 and older OR (3)  American aged 72 and older  3  Osteoporosis Screening: covered every 2 years if you meet one of the following conditions: (1) Have a vertebral abnormality; (2) On glucocorticoid therapy for more than 3 months; (3) Have primary hyperparathyroidism; (4) On osteoporosis medications and need to assess response to drug therapy  5  HIV Screening: covered annually if you're between the age of 12-76  Also covered annually if you are younger than 13 and older than 72 with risk factors for HIV infection  For pregnant patients, it is covered up to 3 times per pregnancy      Immunizations:  Immunization Recommendations   Influenza Vaccine Annual influenza vaccination during flu season is recommended for all persons aged >= 6 months who do not have contraindications   Pneumococcal Vaccine (Prevnar and Pneumovax)  * Prevnar = PCV13  * Pneumovax = PPSV23 Adults 25-60 years old: 1-3 doses may be recommended based on certain risk factors  Adults 72 years old: Prevnar (PCV13) vaccine recommended followed by Pneumovax (PPSV23) vaccine  If already received PPSV23 since turning 65, then PCV13 recommended at least one year after PPSV23 dose  Hepatitis B Vaccine 3 dose series if at intermediate or high risk (ex: diabetes, end stage renal disease, liver disease)   Tetanus (Td) Vaccine - COST NOT COVERED BY MEDICARE PART B Following completion of primary series, a booster dose should be given every 10 years to maintain immunity against tetanus  Td may also be given as tetanus wound prophylaxis  Tdap Vaccine - COST NOT COVERED BY MEDICARE PART B Recommended at least once for all adults  For pregnant patients, recommended with each pregnancy  Shingles Vaccine (Shingrix) - COST NOT COVERED BY MEDICARE PART B  2 shot series recommended in those aged 48 and above     Health Maintenance Due:      Topic Date Due    Colorectal Cancer Screening  04/09/2022 (Originally [birth year]1996)    Hepatitis C Screening  Completed     Immunizations Due:      Topic Date Due    COVID-19 Vaccine (3 - Booster for Villanueva Peter series) 12/05/2021     Advance Directives   What are advance directives? Advance directives are legal documents that state your wishes and plans for medical care  These plans are made ahead of time in case you lose your ability to make decisions for yourself  Advance directives can apply to any medical decision, such as the treatments you want, and if you want to donate organs  What are the types of advance directives? There are many types of advance directives, and each state has rules about how to use them  You may choose a combination of any of the following:  · Living will: This is a written record of the treatment you want  You can also choose which treatments you do not want, which to limit, and which to stop at a certain time   This includes surgery, medicine, IV fluid, and tube feedings  · Durable power of  for healthcare [facility]): This is a written record that states who you want to make healthcare choices for you when you are unable to make them for yourself  This person, called a proxy, is usually a family member or a friend  You may choose more than 1 proxy  · Do not resuscitate (DNR) order:  A DNR order is used in case your heart stops beating or you stop breathing  It is a request not to have certain forms of treatment, such as CPR  A DNR order may be included in other types of advance directives  · Medical directive: This covers the care that you want if you are in a coma, near death, or unable to make decisions for yourself  You can list the treatments you want for each condition  Treatment may include pain medicine, surgery, blood transfusions, dialysis, IV or tube feedings, and a ventilator (breathing machine)  · Values history: This document has questions about your views, beliefs, and how you feel and think about life  This information can help others choose the care that you would choose  Why are advance directives important? An advance directive helps you control your care  Although spoken wishes may be used, it is better to have your wishes written down  Spoken wishes can be misunderstood, or not followed  Treatments may be given even if you do not want them  An advance directive may make it easier for your family to make difficult choices about your care  Cigarette Smoking and Your Health   Risks to your health if you smoke:  Nicotine and other chemicals found in tobacco damage every cell in your body  Even if you are a light smoker, you have an increased risk for cancer, heart disease, and lung disease  If you are pregnant or have diabetes, smoking increases your risk for complications  Benefits to your health if you stop smoking:   · You decrease respiratory symptoms such as coughing, wheezing, and shortness of breath     · You reduce your risk for cancers of the lung, mouth, throat, kidney, bladder, pancreas, stomach, and cervix  If you already have cancer, you increase the benefits of chemotherapy  You also reduce your risk for cancer returning or a second cancer from developing  · You reduce your risk for heart disease, blood clots, heart attack, and stroke  · You reduce your risk for lung infections, and diseases such as pneumonia, asthma, chronic bronchitis, and emphysema  · Your circulation improves  More oxygen can be delivered to your body  If you have diabetes, you lower your risk for complications, such as kidney, artery, and eye diseases  You also lower your risk for nerve damage  Nerve damage can lead to amputations, poor vision, and blindness  · You improve your body's ability to heal and to fight infections  For more information and support to stop smoking:   · [employer]  Phone: 0- 736 - 193-6300  Web Address: [employer]  Weight Management   Why it is important to manage your weight:  Being overweight increases your risk of health conditions such as heart disease, high blood pressure, type 2 diabetes, and certain types of cancer  It can also increase your risk for osteoarthritis, sleep apnea, and other respiratory problems  Aim for a slow, steady weight loss  Even a small amount of weight loss can lower your risk of health problems  How to lose weight safely:  A safe and healthy way to lose weight is to eat fewer calories and get regular exercise  You can lose up about 1 pound a week by decreasing the number of calories you eat by 500 calories each day  Healthy meal plan for weight management:  A healthy meal plan includes a variety of foods, contains fewer calories, and helps you stay healthy  A healthy meal plan includes the following:  · Eat whole-grain foods more often  A healthy meal plan should contain fiber  Fiber is the part of grains, fruits, and vegetables that is not broken down by your body  Whole-grain foods are healthy and provide extra fiber in your diet  Some examples of whole-grain foods are whole-wheat breads and pastas, oatmeal, brown rice, and bulgur  · Eat a variety of vegetables every day  Include dark, leafy greens such as spinach, kale, jay greens, and mustard greens  Eat yellow and orange vegetables such as carrots, sweet potatoes, and winter squash  · Eat a variety of fruits every day  Choose fresh or canned fruit (canned in its own juice or light syrup) instead of juice  Fruit juice has very little or no fiber  · Eat low-fat dairy foods  Drink fat-free (skim) milk or 1% milk  Eat fat-free yogurt and low-fat cottage cheese  Try low-fat cheeses such as mozzarella and other reduced-fat cheeses  · Choose meat and other protein foods that are low in fat  Choose beans or other legumes such as split peas or lentils  Choose fish, skinless poultry (chicken or turkey), or lean cuts of red meat (beef or pork)  Before you cook meat or poultry, cut off any visible fat  · Use less fat and oil  Try baking foods instead of frying them  Add less fat, such as margarine, sour cream, regular salad dressing and mayonnaise to foods  Eat fewer high-fat foods  Some examples of high-fat foods include french fries, doughnuts, ice cream, and cakes  · Eat fewer sweets  Limit foods and drinks that are high in sugar  This includes candy, cookies, regular soda, and sweetened drinks  Exercise:  Exercise at least 30 minutes per day on most days of the week  Some examples of exercise include walking, biking, dancing, and swimming  You can also fit in more physical activity by taking the stairs instead of the elevator or parking farther away from stores  Ask your healthcare provider about the best exercise plan for you     Narcotic (Opioid) Safety    Use narcotics safely:  · Take prescribed narcotics exactly as directed  · Do not give narcotics to others or take narcotics that belong to someone else  · Do not mix narcotics without medicines or alcohol  · Do not drive or operate heavy machinery after you take the narcotic  · Monitor for side effects and notify your healthcare provider if you experienced side effects such as nausea, sleepiness, itching, or trouble thinking clearly  Manage constipation:    Constipation is the most common side effect of narcotic medicine  Constipation is when you have hard, dry bowel movements, or you go longer than usual between bowel movements  Tell your healthcare provider about all changes in your bowel movements while you are taking narcotics  He or she may recommend laxative medicine to help you have a bowel movement  He or she may also change the kind of narcotic you are taking, or change when you take it  The following are more ways you can prevent or relieve constipation:    · Drink liquids as directed  You may need to drink extra liquids to help soften and move your bowels  Ask how much liquid to drink each day and which liquids are best for you  · Eat high-fiber foods  This may help decrease constipation by adding bulk to your bowel movements  High-fiber foods include fruits, vegetables, whole-grain breads and cereals, and beans  Your healthcare provider or dietitian can help you create a high-fiber meal plan  Your provider may also recommend a fiber supplement if you cannot get enough fiber from food  · Exercise regularly  Regular physical activity can help stimulate your intestines  Walking is a good exercise to prevent or relieve constipation  Ask which exercises are best for you  · Schedule a time each day to have a bowel movement  This may help train your body to have regular bowel movements  Bend forward while you are on the toilet to help move the bowel movement out  Sit on the toilet for at least 10 minutes, even if you do not have a bowel movement  Store narcotics safely:   · Store narcotics where others cannot easily get them    Keep them in a locked cabinet or secure area  Do not  keep them in a purse or other bag you carry with you  A person may be looking for something else and find the narcotics  · Make sure narcotics are stored out of the reach of children  A child can easily overdose on narcotics  Narcotics may look like candy to a small child  The best way to dispose of narcotics: The laws vary by country and area  In the [place], the best way is to return the narcotics through a take-back program  This program is offered by the [employer])  The following are options for using the program:  · Take the narcotics to a RHYS collection site  The site is often a law enforcement center  Call your local law enforcement center for scheduled take-back days in your area  You will be given information on where to go if the collection site is in a different location  · Take the narcotics to an approved pharmacy or hospital   A pharmacy or hospital may be set up as a collection site  You will need to ask if it is a RHYS collection site if you were not directed there  A pharmacy or doctor's office may not be able to take back narcotics unless it is a RHYS site  · Use a mail-back system  This means you are given containers to put the narcotics into  You will then mail them in the containers  · Use a take-back drop box  This is a place to leave the narcotics at any time  People and animals will not be able to get into the box  Your local law enforcement agency can tell you where to find a drop box in your area  Other ways to manage pain:   · Ask your healthcare provider about non-narcotic medicines to control pain  Nonprescription medicines include NSAIDs (such as ibuprofen) and acetaminophen  Prescription medicines include muscle relaxers, antidepressants, and steroids  · Pain may be managed without any medicines  Some ways to relieve pain include massage, aromatherapy, or meditation   Physical or occupational therapy may also help  For more information:   · Drug Enforcement Administration  [address]  Phone: 5- 242 - 240-6310  Web Address: [email or web address]_[email or web address]    · [address] and Drug Administration  [place] , [address]  Phone: 2- 975 - 460-3998  Web Address: http://[employer]/     © Copyright [employer] 2018 Information is for End User's use only and may not be sold, redistributed or otherwise used for commercial purposes   All illustrations and images included in CareNotes® are the copyrighted property of [employer]  or Carmine Devine

## 2021-12-09 ENCOUNTER — TELEPHONE (OUTPATIENT)
Dept: FAMILY MEDICINE CLINIC | Facility: CLINIC | Age: 76
End: 2021-12-09

## 2021-12-09 LAB
BACTERIA SPEC AEROBE CULT: ABNORMAL
Lab: ABNORMAL
Lab: ABNORMAL
SL AMB ANTIMICROBIAL SUSCEPTIBILITY: ABNORMAL

## 2021-12-20 DIAGNOSIS — F41.9 ANXIETY: ICD-10-CM

## 2021-12-20 DIAGNOSIS — G89.4 CHRONIC PAIN SYNDROME: ICD-10-CM

## 2021-12-20 RX ORDER — LORAZEPAM 0.5 MG/1
0.5 TABLET ORAL EVERY 8 HOURS PRN
Qty: 90 TABLET | Refills: 0 | Status: SHIPPED | OUTPATIENT
Start: 2021-12-20 | End: 2022-01-10 | Stop reason: SDUPTHER

## 2021-12-20 RX ORDER — OXYCODONE HYDROCHLORIDE 30 MG/1
30 TABLET ORAL EVERY 4 HOURS PRN
Qty: 60 TABLET | Refills: 0 | Status: SHIPPED | OUTPATIENT
Start: 2021-12-20 | End: 2021-12-23 | Stop reason: SDUPTHER

## 2021-12-23 DIAGNOSIS — I10 ESSENTIAL HYPERTENSION: ICD-10-CM

## 2021-12-23 DIAGNOSIS — G89.4 CHRONIC PAIN SYNDROME: ICD-10-CM

## 2021-12-23 RX ORDER — AMLODIPINE BESYLATE 2.5 MG/1
2.5 TABLET ORAL DAILY
Qty: 90 TABLET | Refills: 0 | Status: SHIPPED | OUTPATIENT
Start: 2021-12-23 | End: 2022-03-24

## 2021-12-23 RX ORDER — AMLODIPINE BESYLATE 2.5 MG/1
2.5 TABLET ORAL DAILY
Qty: 90 TABLET | Refills: 0 | Status: CANCELLED | OUTPATIENT
Start: 2021-12-23

## 2021-12-29 DIAGNOSIS — J44.9 CHRONIC OBSTRUCTIVE PULMONARY DISEASE, UNSPECIFIED COPD TYPE (HCC): ICD-10-CM

## 2021-12-29 DIAGNOSIS — G89.4 CHRONIC PAIN SYNDROME: ICD-10-CM

## 2021-12-29 RX ORDER — OXYCODONE HYDROCHLORIDE 30 MG/1
30 TABLET ORAL EVERY 4 HOURS PRN
Qty: 120 TABLET | Refills: 0 | Status: SHIPPED | OUTPATIENT
Start: 2021-12-29 | End: 2022-01-14 | Stop reason: SDUPTHER

## 2021-12-29 RX ORDER — OXYCODONE HYDROCHLORIDE 30 MG/1
30 TABLET ORAL EVERY 4 HOURS PRN
Qty: 60 TABLET | Refills: 0 | OUTPATIENT
Start: 2021-12-29

## 2022-01-01 LAB — EXT SARS-COV-2: NEGATIVE

## 2022-01-01 RX ORDER — PREDNISONE 20 MG/1
TABLET ORAL
COMMUNITY
Start: 2022-01-01

## 2022-01-01 RX ORDER — DOXYCYCLINE HYCLATE 100 MG/1
CAPSULE ORAL
COMMUNITY
Start: 2022-01-01 | End: 2022-01-01 | Stop reason: ALTCHOICE

## 2022-01-01 RX ORDER — CARVEDILOL 6.25 MG/1
6.25 TABLET ORAL 2 TIMES DAILY
COMMUNITY
Start: 2022-01-01

## 2022-01-01 RX ORDER — FUROSEMIDE 80 MG
TABLET ORAL
COMMUNITY
Start: 2022-01-01 | End: 2022-01-01 | Stop reason: SDUPTHER

## 2022-01-01 RX ORDER — NITROGLYCERIN 0.4 MG/1
TABLET SUBLINGUAL
COMMUNITY
Start: 2022-01-01 | End: 2022-01-01

## 2022-01-10 DIAGNOSIS — F41.9 ANXIETY: ICD-10-CM

## 2022-01-10 DIAGNOSIS — G89.4 CHRONIC PAIN SYNDROME: ICD-10-CM

## 2022-01-10 DIAGNOSIS — M62.838 MUSCLE SPASM: ICD-10-CM

## 2022-01-10 RX ORDER — OXYCODONE HYDROCHLORIDE 30 MG/1
30 TABLET ORAL EVERY 4 HOURS PRN
Qty: 60 TABLET | Refills: 0 | OUTPATIENT
Start: 2022-01-10

## 2022-01-10 RX ORDER — LORAZEPAM 0.5 MG/1
0.5 TABLET ORAL EVERY 8 HOURS PRN
Qty: 90 TABLET | Refills: 0 | Status: CANCELLED | OUTPATIENT
Start: 2022-01-10

## 2022-01-10 RX ORDER — CARISOPRODOL 350 MG/1
350 TABLET ORAL 4 TIMES DAILY
Qty: 120 TABLET | Refills: 0 | Status: CANCELLED | OUTPATIENT
Start: 2022-01-10

## 2022-01-10 RX ORDER — LORAZEPAM 0.5 MG/1
0.5 TABLET ORAL EVERY 8 HOURS PRN
Qty: 90 TABLET | Refills: 0 | Status: SHIPPED | OUTPATIENT
Start: 2022-01-10 | End: 2022-02-07 | Stop reason: SDUPTHER

## 2022-01-10 RX ORDER — CARISOPRODOL 350 MG/1
350 TABLET ORAL 4 TIMES DAILY
Qty: 120 TABLET | Refills: 0 | Status: SHIPPED | OUTPATIENT
Start: 2022-01-10 | End: 2022-02-28 | Stop reason: SDUPTHER

## 2022-01-10 NOTE — TELEPHONE ENCOUNTER
PT call and said they want half his medication at least two week worth because he can not afford the full price say the medication is going up   PT says he will be paying cash for all the medication but want it cut in half

## 2022-01-10 NOTE — TELEPHONE ENCOUNTER
Ok I sent 2 prescriptions  The oxycodone was last filled on 12/29 so not due until next week     Will send 10 days worth for next prescription due next week

## 2022-01-14 DIAGNOSIS — G89.4 CHRONIC PAIN SYNDROME: ICD-10-CM

## 2022-01-14 NOTE — TELEPHONE ENCOUNTER
It was filled at the pharmacy for regular amount on 12/24 #210 pills for 30 days     Next is not due until 1/21

## 2022-01-14 NOTE — TELEPHONE ENCOUNTER
That lower note was for luh    His medication is also early   He had #120 tabs on 12/29  His is due on 1/17

## 2022-01-17 RX ORDER — OXYCODONE HYDROCHLORIDE 30 MG/1
30 TABLET ORAL EVERY 4 HOURS PRN
Qty: 120 TABLET | Refills: 0 | Status: SHIPPED | OUTPATIENT
Start: 2022-01-17 | End: 2022-02-07 | Stop reason: SDUPTHER

## 2022-01-20 DIAGNOSIS — I25.10 CORONARY ATHEROSCLEROSIS DUE TO CALCIFIED CORONARY LESION: ICD-10-CM

## 2022-01-20 DIAGNOSIS — I73.9 PERIPHERAL VASCULAR DISEASE (HCC): ICD-10-CM

## 2022-01-20 DIAGNOSIS — I63.9 RIGHT HEMISPHERE, CEREBRAL INFARCTION (HCC): ICD-10-CM

## 2022-01-20 DIAGNOSIS — I25.84 CORONARY ATHEROSCLEROSIS DUE TO CALCIFIED CORONARY LESION: ICD-10-CM

## 2022-01-20 RX ORDER — CLOPIDOGREL BISULFATE 75 MG/1
TABLET ORAL
Qty: 90 TABLET | Refills: 1 | Status: SHIPPED | OUTPATIENT
Start: 2022-01-20 | End: 2022-06-09 | Stop reason: SDUPTHER

## 2022-01-25 DIAGNOSIS — L03.317 CELLULITIS OF BUTTOCK: Primary | ICD-10-CM

## 2022-01-25 RX ORDER — SULFAMETHOXAZOLE AND TRIMETHOPRIM 800; 160 MG/1; MG/1
1 TABLET ORAL EVERY 12 HOURS SCHEDULED
Qty: 20 TABLET | Refills: 0 | Status: SHIPPED | OUTPATIENT
Start: 2022-01-25 | End: 2022-02-04

## 2022-01-25 NOTE — TELEPHONE ENCOUNTER
Pt wife call and said he was recent put on an antibiotics for his rectum and wants to know if he can get that refill again says it coming back again

## 2022-01-31 DIAGNOSIS — F41.9 ANXIETY: ICD-10-CM

## 2022-01-31 DIAGNOSIS — G89.4 CHRONIC PAIN SYNDROME: ICD-10-CM

## 2022-02-01 RX ORDER — OXYCODONE HYDROCHLORIDE 30 MG/1
30 TABLET ORAL EVERY 4 HOURS PRN
Qty: 120 TABLET | Refills: 0 | OUTPATIENT
Start: 2022-02-01

## 2022-02-01 RX ORDER — LORAZEPAM 0.5 MG/1
0.5 TABLET ORAL EVERY 8 HOURS PRN
Qty: 90 TABLET | Refills: 0 | OUTPATIENT
Start: 2022-02-01

## 2022-02-04 DIAGNOSIS — G89.4 CHRONIC PAIN SYNDROME: ICD-10-CM

## 2022-02-04 DIAGNOSIS — F41.9 ANXIETY: ICD-10-CM

## 2022-02-06 PROBLEM — I47.2 VENTRICULAR TACHYCARDIA (HCC): Status: ACTIVE | Noted: 2022-02-06

## 2022-02-06 PROBLEM — K82.8 GALLBLADDER SLUDGE: Status: ACTIVE | Noted: 2022-02-06

## 2022-02-06 PROBLEM — I47.20 VENTRICULAR TACHYCARDIA: Status: ACTIVE | Noted: 2022-02-06

## 2022-02-06 PROBLEM — Z79.899 ON AMIODARONE THERAPY: Status: ACTIVE | Noted: 2022-02-06

## 2022-02-06 PROBLEM — Z45.02 ICD (IMPLANTABLE CARDIOVERTER-DEFIBRILLATOR) BATTERY DEPLETION: Status: ACTIVE | Noted: 2022-02-06

## 2022-02-07 ENCOUNTER — TELEPHONE (OUTPATIENT)
Dept: FAMILY MEDICINE CLINIC | Facility: CLINIC | Age: 77
End: 2022-02-07

## 2022-02-07 DIAGNOSIS — F41.9 ANXIETY: ICD-10-CM

## 2022-02-07 DIAGNOSIS — G89.4 CHRONIC PAIN SYNDROME: ICD-10-CM

## 2022-02-07 RX ORDER — LORAZEPAM 0.5 MG/1
0.5 TABLET ORAL EVERY 8 HOURS PRN
Qty: 90 TABLET | Refills: 0 | OUTPATIENT
Start: 2022-02-07

## 2022-02-07 RX ORDER — OXYCODONE HYDROCHLORIDE 30 MG/1
30 TABLET ORAL EVERY 4 HOURS PRN
Qty: 120 TABLET | Refills: 0 | Status: SHIPPED | OUTPATIENT
Start: 2022-02-07 | End: 2022-02-28 | Stop reason: SDUPTHER

## 2022-02-07 RX ORDER — LORAZEPAM 0.5 MG/1
0.5 TABLET ORAL EVERY 8 HOURS PRN
Qty: 90 TABLET | Refills: 0 | Status: SHIPPED | OUTPATIENT
Start: 2022-02-07 | End: 2022-03-21 | Stop reason: SDUPTHER

## 2022-02-07 RX ORDER — OXYCODONE HYDROCHLORIDE 30 MG/1
30 TABLET ORAL EVERY 4 HOURS PRN
Qty: 120 TABLET | Refills: 0 | OUTPATIENT
Start: 2022-02-07

## 2022-02-07 NOTE — TELEPHONE ENCOUNTER
So have them fill what they can and I can send another prescription later for the rest or later to a different pharmacy if they do not have the rest

## 2022-02-07 NOTE — TELEPHONE ENCOUNTER
PT CALL AND SAID THE PHARMACY DOSE NOT HAVE ENOUGH TO FILL HIS MEDICATION AND THE PHARMACY DID NOT TOLD THEM HOW MUCH THEY HAVE SO PT NEED HIS MEDS CALL IN [facility]BE [facility]

## 2022-02-11 ENCOUNTER — TELEPHONE (OUTPATIENT)
Dept: FAMILY MEDICINE CLINIC | Facility: CLINIC | Age: 77
End: 2022-02-11

## 2022-02-11 NOTE — TELEPHONE ENCOUNTER
FYI  Pt been seeing visiting nurse , nurse added PT to access strength   At pt's request PT eval have been delayed until next week,

## 2022-02-18 ENCOUNTER — TELEPHONE (OUTPATIENT)
Dept: FAMILY MEDICINE CLINIC | Facility: CLINIC | Age: 77
End: 2022-02-18

## 2022-02-18 DIAGNOSIS — L02.31 ABSCESS OF BUTTOCK: Primary | ICD-10-CM

## 2022-02-18 RX ORDER — SULFAMETHOXAZOLE AND TRIMETHOPRIM 800; 160 MG/1; MG/1
1 TABLET ORAL EVERY 12 HOURS SCHEDULED
Qty: 20 TABLET | Refills: 0 | Status: SHIPPED | OUTPATIENT
Start: 2022-02-18 | End: 2022-02-28

## 2022-02-18 NOTE — TELEPHONE ENCOUNTER
I sent an antibiotic to the pharmacy  But this is a recurrent issue so there may be a tract from the intestines    He really needs an evaluation with colorectal specialist [phone]  [address] [place] [address] [address]

## 2022-02-18 NOTE — TELEPHONE ENCOUNTER
Pt visiting nurse call and said he is developing another cyst on buttocks and it is visible and want to know if you can send in some antibiotics or do you want to see him he is very pain and un comfortable and he already took his pain medicine     cb [phone]

## 2022-02-24 ENCOUNTER — OFFICE VISIT (OUTPATIENT)
Dept: WOUND CARE | Facility: HOSPITAL | Age: 77
End: 2022-02-24
Payer: COMMERCIAL

## 2022-02-24 VITALS
HEART RATE: 68 BPM | WEIGHT: 170 LBS | TEMPERATURE: 97.6 F | BODY MASS INDEX: 24.74 KG/M2 | DIASTOLIC BLOOD PRESSURE: 70 MMHG | SYSTOLIC BLOOD PRESSURE: 140 MMHG | RESPIRATION RATE: 18 BRPM

## 2022-02-24 DIAGNOSIS — F17.210 CIGARETTE NICOTINE DEPENDENCE WITHOUT COMPLICATION: ICD-10-CM

## 2022-02-24 DIAGNOSIS — L03.317 CELLULITIS AND ABSCESS OF BUTTOCK: ICD-10-CM

## 2022-02-24 DIAGNOSIS — L02.31 CELLULITIS AND ABSCESS OF BUTTOCK: ICD-10-CM

## 2022-02-24 DIAGNOSIS — L98.419: Primary | ICD-10-CM

## 2022-02-24 PROCEDURE — 99203 OFFICE O/P NEW LOW 30 MIN: CPT | Performed by: FAMILY MEDICINE

## 2022-02-24 PROCEDURE — 99213 OFFICE O/P EST LOW 20 MIN: CPT | Performed by: FAMILY MEDICINE

## 2022-02-24 RX ORDER — LIDOCAINE HYDROCHLORIDE 40 MG/ML
5 SOLUTION TOPICAL ONCE
Status: COMPLETED | OUTPATIENT
Start: 2022-02-24 | End: 2022-02-24

## 2022-02-24 RX ADMIN — LIDOCAINE HYDROCHLORIDE 5 ML: 40 SOLUTION TOPICAL at 11:03

## 2022-02-24 NOTE — PROGRESS NOTES
Patient ID: Suzi Garcia  is a 69 y o  male Date of Birth 1945     Chief Complaint  Chief Complaint   Patient presents with    New Patient Visit     wound care       Allergies  Gabapentin    Assessment:    No problem-specific Assessment & Plan notes found for this encounter  Diagnoses and all orders for this visit:    Non-pressure chronic ulcer of buttock, unspecified ulcer stage (HCC)    Cellulitis and abscess of buttock  -     Wound cleansing and dressings; Future  -     lidocaine (XYLOCAINE) 4 % topical solution 5 mL    Cigarette nicotine dependence without complication              Procedures    Plan:   Initial evaluation  The area of induration appears to be resolving  Will continue to monitor closely, discussed the possibility that the tiny wound that appears to be directly connected to this area may need to be open further  Ok to continue wound management with Iodoform packing, see wound orders below  The gauze should extend to cover the 2nd tiny wound  This was demonstrated to son  Smoking cessation discussed  Followup in 1 week or call sooner with questions or concerns    Wound 02/24/22 Abscess Buttocks Left (Active)   Wound Image Images linked 02/24/22 1013   Wound Description Granulation tissue; Hypergranulation 02/24/22 1013   Freda-wound Assessment Intact 02/24/22 1013   Wound Length (cm) 1 2 cm 02/24/22 1013   Wound Width (cm) 0 3 cm (0 4) 02/24/22 1013   Wound Depth (cm) 0 3 cm 02/24/22 1013   Wound Surface Area (cm^2) 0 36 cm^2 02/24/22 1013   Wound Volume (cm^3) 0 108 cm^3 02/24/22 1013   Calculated Wound Volume (cm^3) 0 11 cm^3 02/24/22 1013   Undermining 0 4 02/24/22 1013   Undermining is depth extending from 1-6 02/24/22 1013   Drainage Amount Small 02/24/22 1013   Drainage Description Serosanguineous 02/24/22 1013   Treatments Cleansed;Irrigation with NSS 02/24/22 1013   Wound packed?  Yes 02/24/22 1013   Packing- # removed 1 (1) 02/24/22 1013   Packing- # inserted 1 02/24/22 1013   Dressing Changed Changed 02/24/22 1013   Patient Tolerance Tolerated well 02/24/22 1013       Wound 02/24/22 Abscess Buttocks Distal (Active)   Wound Image Images linked 02/24/22 1045   Freda-wound Assessment Intact 02/24/22 1045   Wound Length (cm) 0 1 cm 02/24/22 1045   Wound Width (cm) 0 1 cm 02/24/22 1045   Wound Depth (cm) 0 4 cm 02/24/22 1045   Wound Surface Area (cm^2) 0 01 cm^2 02/24/22 1045   Wound Volume (cm^3) 0 004 cm^3 02/24/22 1045   Calculated Wound Volume (cm^3) 0 cm^3 02/24/22 1045   Drainage Amount Small 02/24/22 1045   Drainage Description Serous 02/24/22 1045   Treatments Irrigation with NSS 02/24/22 1045   Wound packed? No 02/24/22 1045   Patient Tolerance Tolerated well 02/24/22 1045   Dressing Status Intact 02/24/22 1045       Wound 02/24/22 Abscess Buttocks Left (Active)   Date First Assessed/Time First Assessed: 02/24/22 1012   Pre-Existing Wound: Yes  Primary Wound Type: Abscess  Location: Buttocks  Wound Location Orientation: Left       Wound 02/24/22 Abscess Buttocks Distal (Active)   Date First Assessed/Time First Assessed: 02/24/22 1043   Pre-Existing Wound: Yes  Primary Wound Type: Abscess  Location: Buttocks  Wound Location Orientation: Distal       Subjective:        Patient presents for initial evaluation of a wound of the left buttock that has been present for about the past 3-4 week  Patient had an abscess that was I&D'd at [address] [place] [address] when he got admitted for possible PNA  He completed a course of Bactrim  Patient is present with his son today who, along with visiting nurses who come twice weekly, is doing dressing changes  Son reports that they are using Iodoform packing which is being changed daily  He notes overall significant improvement  Patient is not being treated for diabetes  Patient is a smoker, trying to cut back        The following portions of the patient's history were reviewed and updated as appropriate:   He  has a past medical history of Acute myocardial infarction ([facility] ), Anxiety, Boil of buttock, Cellulitis of fifth toe, left, Central obesity, and Ulcer of toe of left foot ([facility] )    He   Patient Active Problem List    Diagnosis Date Noted    Non-pressure chronic ulcer of buttock ([facility] ) 02/24/2022    Gallbladder sludge 02/06/2022    ICD (implantable cardioverter-defibrillator) battery depletion 02/06/2022    On amiodarone therapy 02/06/2022    Ventricular tachycardia ([facility] ) 02/06/2022    Cellulitis and abscess of buttock 10/20/2021    Fall with injury 04/12/2021    Acute bilateral low back pain with bilateral sciatica 04/12/2021    Long-term use of high-risk medication 57/81/4873    Uncomplicated opioid dependence ([place] [address] ) 04/09/2021    Need for vaccination 11/27/2020    Biventricular automatic implantable cardioverter defibrillator in situ 02/12/2020    Transient ischemic attack 02/12/2020    Chronic systolic (congestive) heart failure ([facility] ) 02/12/2020    Disorder of implanted defibrillator generator 02/12/2020    Dyslipidemia 02/12/2020    Gallbladder disease 02/12/2020    Hypersomnia 02/12/2020    Non-rheumatic mitral regurgitation 02/12/2020    Old myocardial infarction 02/12/2020    Other long term (current) drug therapy 02/12/2020    Paroxysmal ventricular tachycardia ([facility] ) 02/12/2020    Personal history of transient ischemic attack (TIA), and cerebral infarction without residual deficits 02/12/2020    Tobacco use 02/12/2020    Ventricular arrhythmia 02/12/2020    Chronic kidney disease, stage 4 (severe) ([facility] ) 02/12/2020    Coronary artery disease of native heart with stable angina pectoris ([facility] ) 02/12/2020    Vasomotor rhinitis 93/89/5130    Umbilical hernia 83/42/6841    Overweight (BMI 25 0-29 9) 06/18/2019    Medicare annual wellness visit, subsequent 06/18/2019    Screening for colon cancer 06/18/2019    Muscle spasm 06/18/2019    Iron deficiency anemia secondary to inadequate dietary iron intake 06/15/2018    Acute exacerbation of chronic obstructive bronchitis (HCC) 06/03/2017    Chronic bronchitis ([facility] ) 06/02/2017    Edema eyelid 04/11/2017    Pain of fifth toe 04/11/2017    Ulceration 04/11/2017    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HCC) 03/09/2017    Type 2 diabetes mellitus with diabetic polyneuropathy, without long-term current use of insulin ([facility] ) 03/01/2017    Peripheral vascular disease ([facility] ) 02/10/2017    Left foot pain 01/09/2017    Right foot pain 01/09/2017    Right hemisphere, cerebral infarction ([facility] ) 10/18/2016    Lumbar radiculopathy 08/30/2016    Right hip pain 08/30/2016    Stage 3 chronic kidney disease 04/08/2016    Vitamin D deficiency 04/08/2016    Degenerative cervical disc 09/02/2015    Neural foraminal stenosis of cervical spine 09/02/2015    Neck pain 08/28/2015    Muscle spasms of neck 08/28/2015    Chronic pain 10/31/2014    Kidney cysts 10/13/2014    Chondromalacia of patella, right 09/25/2014    Primary osteoarthritis of right knee 09/25/2014    Constipation 09/22/2014    Right knee pain 09/19/2014    Solitary lung nodule 09/19/2014    Abnormal weight loss 08/30/2014    Dysphagia 08/30/2014    Fatigue 08/30/2014    Hoarseness 08/30/2014    Vallecular mass 08/29/2014    Acute right lumbar radiculopathy 07/23/2014    Lumbosacral spondylosis 07/23/2014    Cigarette nicotine dependence without complication 96/03/5144    Chronic bilateral low back pain with bilateral sciatica 07/02/2014    Neuralgia 07/02/2014    Ischemic cardiomyopathy 02/25/2014    Lower abdominal pain 02/25/2014    Onychomycosis of toenail 02/25/2014    Allergic rhinitis 05/06/2013    Shortness of breath 01/07/2013    Anxiety 08/31/2012    Esophageal reflux 08/31/2012    Coronary arteriosclerosis in native artery 07/06/2012    Chronic obstructive pulmonary disease ([facility] ) 07/06/2012    Hyperlipidemia 07/06/2012    Essential hypertension 07/06/2012    Other chronic pain 06/28/2012    Benign prostatic hyperplasia 06/12/2012     He  has a past surgical history that includes Appendectomy; Coronary angioplasty with stent; Cardiac defibrillator placement; and Inguinal hernia repair (Left)  He  reports that he has been smoking  He has been smoking about 1 00 pack per day  He has never used smokeless tobacco  He reports previous alcohol use  He reports that he does not use drugs  Current Outpatient Medications   Medication Sig Dispense Refill    amiodarone 100 mg tablet       amLODIPine (NORVASC) 2 5 mg tablet Take 1 tablet (2 5 mg total) by mouth daily 90 tablet 0    atenolol (TENORMIN) 25 mg tablet 1/2 tab daily 15 tablet 5    atorvastatin (LIPITOR) 80 mg tablet TAKE 1 TABLET BY MOUTH EVERY DAY 90 tablet 0    carisoprodol (SOMA) 350 mg tablet Take 1 tablet (350 mg total) by mouth 4 (four) times a day 120 tablet 0    clopidogrel (PLAVIX) 75 mg tablet TAKE 1 TABLET BY MOUTH EVERY DAY 90 tablet 1    enalapril (VASOTEC) 20 mg tablet TAKE 1 TABLET BY MOUTH EVERY DAY 90 tablet 2    famotidine (PEPCID) 20 mg tablet Take 1 tablet by mouth 2 (two) times a day      fluticasone (FLONASE) 50 mcg/act nasal spray SPRAY 2 SPRAYS INTO EACH NOSTRIL EVERY DAY 48 mL 0    furosemide (LASIX) 40 mg tablet Take 1 5 tablets (60 mg total) by mouth daily 135 tablet 0    ipratropium (ATROVENT) 0 06 % nasal spray 2 sprays into each nostril 4 (four) times a day 15 mL 5    lidocaine (XYLOCAINE) 5 % ointment Apply topically as needed for mild pain 50 g 0    LORazepam (ATIVAN) 0 5 mg tablet Take 1 tablet (0 5 mg total) by mouth every 8 (eight) hours as needed for anxiety 90 tablet 0    naloxone (NARCAN) 4 mg/0 1 mL nasal spray Administer 1 spray into a nostril  If breathing does not return to normal or if breathing difficulty resumes after 2-3 minutes, give another dose in the other nostril using a new spray  1 each 1    naloxone (NARCAN) 4 mg/0 1 mL nasal spray Administer 1 spray into a nostril   If breathing does not return to normal or if breathing difficulty resumes after 2-3 minutes, give another dose in the other nostril using a new spray  1 each 1    nitroglycerin (NITRODUR) 0 4 mg/hr Place 1 patch on the skin daily 90 patch 0    oxyCODONE (ROXICODONE) 30 MG immediate release tablet Take 1 tablet (30 mg total) by mouth every 4 (four) hours as needed for moderate pain Max Daily Amount: 180 mg 120 tablet 0    oxyCODONE ER (Xtampza ER) 9 MG C12A Take 1 each by mouth every 12 (twelve) hoursMax Daily Amount: 2 each 60 each 0    sulfamethoxazole-trimethoprim (BACTRIM DS) 800-160 mg per tablet Take 1 tablet by mouth every 12 (twelve) hours for 10 days 20 tablet 0    Symbicort 160-4 5 MCG/ACT inhaler TAKE 2 PUFFS BY MOUTH TWICE A DAY 30 6 g 3    tamsulosin (FLOMAX) 0 4 mg TAKE 1 CAPSULE BY MOUTH AT BEDTIME 90 capsule 3    Ventolin  (90 Base) MCG/ACT inhaler Inhale 2 puffs every 4 (four) hours as needed for wheezing 18 g 5     No current facility-administered medications for this visit  He is allergic to gabapentin       Review of Systems   Constitutional: Negative for chills and fever  HENT: Negative for congestion and sneezing  Respiratory: Negative for cough  Skin: Positive for wound  Psychiatric/Behavioral: Negative for agitation  Objective:       Wound 02/24/22 Abscess Buttocks Left (Active)   Wound Image Images linked 02/24/22 1013   Wound Description Granulation tissue; Hypergranulation 02/24/22 1013   Freda-wound Assessment Intact 02/24/22 1013   Wound Length (cm) 1 2 cm 02/24/22 1013   Wound Width (cm) 0 3 cm (0 4) 02/24/22 1013   Wound Depth (cm) 0 3 cm 02/24/22 1013   Wound Surface Area (cm^2) 0 36 cm^2 02/24/22 1013   Wound Volume (cm^3) 0 108 cm^3 02/24/22 1013   Calculated Wound Volume (cm^3) 0 11 cm^3 02/24/22 1013   Undermining 0 4 02/24/22 1013   Undermining is depth extending from 1-6 02/24/22 1013   Drainage Amount Small 02/24/22 1013   Drainage Description Serosanguineous 02/24/22 1013   Treatments Cleansed;Irrigation with NSS 02/24/22 1013   Wound packed? Yes 02/24/22 1013   Packing- # removed 1 (1) 02/24/22 1013   Packing- # inserted 1 02/24/22 1013   Dressing Changed Changed 02/24/22 1013   Patient Tolerance Tolerated well 02/24/22 1013       Wound 02/24/22 Abscess Buttocks Distal (Active)   Wound Image Images linked 02/24/22 1045   Freda-wound Assessment Intact 02/24/22 1045   Wound Length (cm) 0 1 cm 02/24/22 1045   Wound Width (cm) 0 1 cm 02/24/22 1045   Wound Depth (cm) 0 4 cm 02/24/22 1045   Wound Surface Area (cm^2) 0 01 cm^2 02/24/22 1045   Wound Volume (cm^3) 0 004 cm^3 02/24/22 1045   Calculated Wound Volume (cm^3) 0 cm^3 02/24/22 1045   Drainage Amount Small 02/24/22 1045   Drainage Description Serous 02/24/22 1045   Treatments Irrigation with NSS 02/24/22 1045   Wound packed? No 02/24/22 1045   Patient Tolerance Tolerated well 02/24/22 1045   Dressing Status Intact 02/24/22 1045       /70   Pulse 68   Temp 97 6 °F (36 4 °C)   Resp 18   Wt 77 1 kg (170 lb)   BMI 24 74 kg/m²     Physical Exam  Constitutional:       General: He is not in acute distress  Appearance: Normal appearance  He is not ill-appearing or toxic-appearing  HENT:      Head: Normocephalic and atraumatic  Right Ear: External ear normal       Left Ear: External ear normal    Eyes:      Conjunctiva/sclera: Conjunctivae normal    Pulmonary:      Effort: Pulmonary effort is normal  No respiratory distress  Musculoskeletal:      Cervical back: Neck supple  Skin:     Comments: See wound assessment  Second area, noted medial to the wound, of nonpainful induration but no fluctuance  Small wound just distal to this area also noted which drains slightly upon palpation of the indurated area  This wound tunnels 1 cm at about 10:00 oclock, towards the area of induration  Neurological:      Mental Status: He is alert     Psychiatric:         Mood and Affect: Mood normal          Behavior: Behavior normal            Wound Instructions:  Orders Placed This Encounter   Procedures    Wound cleansing and dressings     Left buttock wound    Wash your hands with soap and water  Remove old dressing, discard into plastic bag and place in trash  Cleanse the wound with NSS prior to applying a clean dressing  Do not use tissue or cotton balls  Do not scrub the wound  Pat dry using gauze  Shower  yes  PACK WOUND WITH  iodoform to the left buttock wound  Cover with gauze  Secure with medfix tape  Change dressing EVERY DAY    Left buttock distal wound  Wash your hands with soap and water  Remove old dressing, discard into plastic bag and place in trash  Cleanse the wound with NSS prior to applying a clean dressing  Do not use tissue or cotton balls  Do not scrub the wound  Pat dry using gauze  Cover with gauze  Secure with medfix tape  Change dressing EVERY DAY    DONE TODAY  RETURN IN 1 WEEK  Standing Status:   Future     Standing Expiration Date:   2/24/2023        Diagnosis ICD-10-CM Associated Orders   1  Non-pressure chronic ulcer of buttock, unspecified ulcer stage ([facility] )  L98 419    2  Cellulitis and abscess of buttock  L02 31 Wound cleansing and dressings    L03 317 lidocaine (XYLOCAINE) 4 % topical solution 5 mL   3   Cigarette nicotine dependence without complication  F57 192

## 2022-02-24 NOTE — PATIENT INSTRUCTIONS
Orders Placed This Encounter   Procedures    Wound cleansing and dressings     Left buttock wound    Wash your hands with soap and water  Remove old dressing, discard into plastic bag and place in trash  Cleanse the wound with NSS prior to applying a clean dressing  Do not use tissue or cotton balls  Do not scrub the wound  Pat dry using gauze  Shower  yes  PACK WOUND WITH  iodoform to the left buttock wound  Cover with gauze  Secure with medfix tape  Change dressing EVERY DAY    Left buttock distal wound  Wash your hands with soap and water  Remove old dressing, discard into plastic bag and place in trash  Cleanse the wound with NSS prior to applying a clean dressing  Do not use tissue or cotton balls  Do not scrub the wound  Pat dry using gauze  Cover with gauze  Secure with medfix tape  Change dressing EVERY DAY    DONE TODAY  RETURN IN 1 WEEK       Standing Status:   Future     Standing Expiration Date:   2/24/2023

## 2022-02-24 NOTE — LETTER
[address] [facility]  [address]  [address]  Phone#  [phone]  Fax#  [phone]    Patient:  Allyson Basilio  YOB: 1945  Phone:  [phone]  Date of Visit:  2/24/2022    Orders Placed This Encounter   Procedures    Wound cleansing and dressings     Left buttock wound    Wash your hands with soap and water  Remove old dressing, discard into plastic bag and place in trash  Cleanse the wound with NSS prior to applying a clean dressing  Do not use tissue or cotton balls  Do not scrub the wound  Pat dry using gauze  Shower  yes  PACK WOUND WITH  iodoform to the left buttock wound  Cover with gauze  Secure with medfix tape  Change dressing EVERY DAY    Left buttock distal wound  Wash your hands with soap and water  Remove old dressing, discard into plastic bag and place in trash  Cleanse the wound with NSS prior to applying a clean dressing  Do not use tissue or cotton balls  Do not scrub the wound  Pat dry using gauze  Cover with gauze  Secure with medfix tape  Change dressing EVERY DAY    DONE TODAY  RETURN IN 1 WEEK       Standing Status:   Future     Standing Expiration Date:   2/24/2023         Electronically signed by Kelsey Hare DO

## 2022-02-28 DIAGNOSIS — G89.4 CHRONIC PAIN SYNDROME: ICD-10-CM

## 2022-02-28 DIAGNOSIS — M62.838 MUSCLE SPASM: ICD-10-CM

## 2022-02-28 RX ORDER — CARISOPRODOL 350 MG/1
350 TABLET ORAL 4 TIMES DAILY
Qty: 120 TABLET | Refills: 0 | Status: SHIPPED | OUTPATIENT
Start: 2022-02-28 | End: 2022-04-06

## 2022-02-28 RX ORDER — OXYCODONE HYDROCHLORIDE 30 MG/1
30 TABLET ORAL EVERY 4 HOURS PRN
Qty: 120 TABLET | Refills: 0 | Status: SHIPPED | OUTPATIENT
Start: 2022-02-28 | End: 2022-03-21 | Stop reason: SDUPTHER

## 2022-03-01 ENCOUNTER — TELEPHONE (OUTPATIENT)
Dept: FAMILY MEDICINE CLINIC | Facility: CLINIC | Age: 77
End: 2022-03-01

## 2022-03-01 NOTE — TELEPHONE ENCOUNTER
[facility] visiting nurses called with [facility]efan[facility] Whitlock that pt is requesting they see him once a week, so they will now be seeing pt 1 time a week

## 2022-03-02 DIAGNOSIS — E78.49 OTHER HYPERLIPIDEMIA: ICD-10-CM

## 2022-03-02 RX ORDER — ATORVASTATIN CALCIUM 80 MG/1
TABLET, FILM COATED ORAL
Qty: 90 TABLET | Refills: 0 | Status: SHIPPED | OUTPATIENT
Start: 2022-03-02

## 2022-03-03 ENCOUNTER — OFFICE VISIT (OUTPATIENT)
Dept: WOUND CARE | Facility: HOSPITAL | Age: 77
End: 2022-03-03
Payer: COMMERCIAL

## 2022-03-03 VITALS
RESPIRATION RATE: 18 BRPM | DIASTOLIC BLOOD PRESSURE: 76 MMHG | SYSTOLIC BLOOD PRESSURE: 118 MMHG | TEMPERATURE: 96.4 F | HEART RATE: 72 BPM

## 2022-03-03 DIAGNOSIS — L02.31 CELLULITIS AND ABSCESS OF BUTTOCK: ICD-10-CM

## 2022-03-03 DIAGNOSIS — F17.210 CIGARETTE NICOTINE DEPENDENCE WITHOUT COMPLICATION: ICD-10-CM

## 2022-03-03 DIAGNOSIS — L98.419: Primary | ICD-10-CM

## 2022-03-03 DIAGNOSIS — L03.317 CELLULITIS AND ABSCESS OF BUTTOCK: ICD-10-CM

## 2022-03-03 PROCEDURE — 99213 OFFICE O/P EST LOW 20 MIN: CPT | Performed by: FAMILY MEDICINE

## 2022-03-03 PROCEDURE — 99212 OFFICE O/P EST SF 10 MIN: CPT | Performed by: FAMILY MEDICINE

## 2022-03-03 RX ORDER — PREDNISONE 10 MG/1
TABLET ORAL DAILY
COMMUNITY
End: 2022-05-31 | Stop reason: ALTCHOICE

## 2022-03-03 NOTE — LETTER
[address] [facility]  [address]  [address]  Phone#  [phone]  Fax#  [phone]    Patient:  Carnella Halsted  YOB: 1945  Phone:  [phone]  Date of Visit:  3/3/2022    Orders Placed This Encounter   Procedures    Wound cleansing and dressings     Left buttock wound    Wash your hands with soap and water  Remove old dressing, discard into plastic bag and place in trash  Cleanse the wound with soap and water prior to applying a clean dressing  Do not use tissue or cotton balls  Do not scrub the wound  Pat dry using gauze  Shower yes     Apply silvasorb to the left buttock wound  Cover with mepilex border lite  Alternate with gauze and tape    Change dressing daily    Done today     Standing Status:   Future     Standing Expiration Date:   3/3/2023         Electronically signed by Valentina Madden DO

## 2022-03-03 NOTE — PATIENT INSTRUCTIONS
Orders Placed This Encounter   Procedures    Wound cleansing and dressings     Left buttock wound    Wash your hands with soap and water  Remove old dressing, discard into plastic bag and place in trash  Cleanse the wound with soap and water prior to applying a clean dressing  Do not use tissue or cotton balls  Do not scrub the wound  Pat dry using gauze  Shower yes     Apply silvasorb to the left buttock wound  Cover with mepilex border lite  Alternate with gauze and tape    Change dressing daily    Done today     Standing Status:   Future     Standing Expiration Date:   3/3/2023

## 2022-03-03 NOTE — PROGRESS NOTES
Patient ID: Angle Goins  is a 69 y o  male Date of Birth 1945     Chief Complaint  Chief Complaint   Patient presents with    Follow Up Wound Care Visit     Dressing intact on arrival, son reports changing daily, VNA changes once a week  Allergies  Gabapentin    Assessment:    No problem-specific Assessment & Plan notes found for this encounter  Diagnoses and all orders for this visit:    Non-pressure chronic ulcer of buttock, unspecified ulcer stage ([address] )  -     Wound cleansing and dressings; Future    Cellulitis and abscess of buttock    Cigarette nicotine dependence without complication    Other orders  -     predniSONE 10 mg tablet; Take by mouth daily              Procedures    Plan:   Wound is improved  No debridement today  Change wound management to [facility] Sorb gel, see wound orders below  Continue to monitor the area induration medial to the wound which is most likely some residual inflammation    Small pinpoint opening distal to this area is closed today  Pressure relief  Stop smoking  Followup in 1 week or call sooner with questions or concerns      Wound 02/24/22 Abscess Buttocks Left (Active)   Wound Image Images linked 03/03/22 1402   Wound Description Granulation tissue 03/03/22 1401   Freda-wound Assessment Intact 03/03/22 1401   Wound Length (cm) 0 5 cm 03/03/22 1401   Wound Width (cm) 0 3 cm 03/03/22 1401   Wound Depth (cm) 0 5 cm 03/03/22 1401   Wound Surface Area (cm^2) 0 15 cm^2 03/03/22 1401   Wound Volume (cm^3) 0 075 cm^3 03/03/22 1401   Calculated Wound Volume (cm^3) 0 08 cm^3 03/03/22 1401   Change in Wound Size % 27 27 03/03/22 1401   Drainage Amount Small 03/03/22 1401   Drainage Description Serosanguineous 03/03/22 1401   Non-staged Wound Description Full thickness 03/03/22 1401   Dressing Status Intact 03/03/22 1401       Wound 02/24/22 Abscess Buttocks Left (Active)   Date First Assessed/Time First Assessed: 02/24/22 1012   Pre-Existing Wound: Yes  Primary Wound Type: Abscess  Location: Buttocks  Wound Location Orientation: Left       [REMOVED] Wound 02/24/22 Abscess Buttocks Distal (Removed)   Resolved Date/Resolved Time: 03/03/22 1401  Date First Assessed/Time First Assessed: 02/24/22 1043   Pre-Existing Wound: Yes  Primary Wound Type: Abscess  Location: Buttocks  Wound Location Orientation: Distal  Wound Outcome: Healed       Subjective:        Patient presents for followup a wound of the left buttock  No increased pain or drainage  Has been using Iodoform packing on wound  The following portions of the patient's history were reviewed and updated as appropriate: He  has a past medical history of Acute myocardial infarction ([facility] [address] ), Anxiety, Boil of buttock, Cellulitis of fifth toe, left, Central obesity, and Ulcer of toe of left foot ([facility] [address] )  He  reports that he has been smoking  He has been smoking about 1 00 pack per day  He has never used smokeless tobacco  He reports previous alcohol use  He reports that he does not use drugs  He is allergic to gabapentin       Review of Systems   Constitutional: Negative for chills and fever  HENT: Negative for congestion and sneezing  Respiratory: Negative for cough  Musculoskeletal: Positive for gait problem  Skin: Positive for wound  Psychiatric/Behavioral: Negative for agitation           Objective:       Wound 02/24/22 Abscess Buttocks Left (Active)   Wound Image Images linked 03/03/22 1402   Wound Description Granulation tissue 03/03/22 1401   Freda-wound Assessment Intact 03/03/22 1401   Wound Length (cm) 0 5 cm 03/03/22 1401   Wound Width (cm) 0 3 cm 03/03/22 1401   Wound Depth (cm) 0 5 cm 03/03/22 1401   Wound Surface Area (cm^2) 0 15 cm^2 03/03/22 1401   Wound Volume (cm^3) 0 075 cm^3 03/03/22 1401   Calculated Wound Volume (cm^3) 0 08 cm^3 03/03/22 1401   Change in Wound Size % 27 27 03/03/22 1401   Drainage Amount Small 03/03/22 1401   Drainage Description Serosanguineous 03/03/22 1401   Non-staged Wound Description Full thickness 03/03/22 1401   Dressing Status Intact 03/03/22 1401       /76   Pulse 72   Temp (!) 96 4 °F (35 8 °C)   Resp 18     Physical Exam  Constitutional:       General: He is not in acute distress  Appearance: Normal appearance  He is not ill-appearing, toxic-appearing or diaphoretic  HENT:      Head: Normocephalic and atraumatic  Right Ear: External ear normal       Left Ear: External ear normal    Eyes:      Conjunctiva/sclera: Conjunctivae normal    Pulmonary:      Effort: Pulmonary effort is normal  No respiratory distress  Musculoskeletal:      Cervical back: Neck supple  Skin:     Comments: See wound assessment   Neurological:      Mental Status: He is alert  Gait: Gait abnormal (Uses cane)  Psychiatric:         Mood and Affect: Mood normal          Behavior: Behavior normal            Wound Instructions:  Orders Placed This Encounter   Procedures    Wound cleansing and dressings     Left buttock wound    Wash your hands with soap and water  Remove old dressing, discard into plastic bag and place in trash  Cleanse the wound with soap and water prior to applying a clean dressing  Do not use tissue or cotton balls  Do not scrub the wound  Pat dry using gauze  Shower yes     Apply silvasorb to the left buttock wound  Cover with mepilex border lite  Alternate with gauze and tape  Change dressing daily    Done today     Standing Status:   Future     Standing Expiration Date:   3/3/2023        Diagnosis ICD-10-CM Associated Orders   1  Non-pressure chronic ulcer of buttock, unspecified ulcer stage (HCC)  L98 419 Wound cleansing and dressings   2  Cellulitis and abscess of buttock  L02 31     L03 317    3   Cigarette nicotine dependence without complication  [id number]

## 2022-03-04 ENCOUNTER — TELEPHONE (OUTPATIENT)
Dept: FAMILY MEDICINE CLINIC | Facility: CLINIC | Age: 77
End: 2022-03-04

## 2022-03-14 ENCOUNTER — OFFICE VISIT (OUTPATIENT)
Dept: WOUND CARE | Facility: HOSPITAL | Age: 77
End: 2022-03-14
Payer: COMMERCIAL

## 2022-03-14 VITALS
SYSTOLIC BLOOD PRESSURE: 138 MMHG | HEART RATE: 66 BPM | DIASTOLIC BLOOD PRESSURE: 66 MMHG | RESPIRATION RATE: 16 BRPM | TEMPERATURE: 97.3 F

## 2022-03-14 DIAGNOSIS — F17.210 CIGARETTE NICOTINE DEPENDENCE WITHOUT COMPLICATION: ICD-10-CM

## 2022-03-14 DIAGNOSIS — L98.419: Primary | ICD-10-CM

## 2022-03-14 DIAGNOSIS — L02.31 CELLULITIS AND ABSCESS OF BUTTOCK: ICD-10-CM

## 2022-03-14 DIAGNOSIS — L03.317 CELLULITIS AND ABSCESS OF BUTTOCK: ICD-10-CM

## 2022-03-14 PROCEDURE — 99213 OFFICE O/P EST LOW 20 MIN: CPT | Performed by: FAMILY MEDICINE

## 2022-03-14 RX ORDER — LIDOCAINE HYDROCHLORIDE 40 MG/ML
5 SOLUTION TOPICAL ONCE
Status: COMPLETED | OUTPATIENT
Start: 2022-03-14 | End: 2022-03-14

## 2022-03-14 RX ADMIN — LIDOCAINE HYDROCHLORIDE 5 ML: 40 SOLUTION TOPICAL at 09:58

## 2022-03-14 NOTE — PATIENT INSTRUCTIONS
Orders Placed This Encounter   Procedures    Wound cleansing and dressings     Wound cleansing and dressings  Left buttock wound     Wash your hands with soap and water  Remove old dressing, discard into plastic bag and place in trash  Cleanse the wound with soap and water prior to applying a clean dressing  Do not use tissue or cotton balls  Do not scrub the wound  Pat dry using gauze  Shower yes      Apply silvasorb to the left buttock wound  Cover with mepilex border lite  Alternate with gauze and tape  Change dressing daily     Standing Status:   Future     Standing Expiration Date:   3/14/2023    Wound off loading     Continue to offload pressure to wound       Standing Status:   Future     Standing Expiration Date:   3/14/2023    Wound home care     [facility]     Standing Status:   Future     Standing Expiration Date:   3/14/2023

## 2022-03-14 NOTE — LETTER
[address] [facility]  [address]  [address]  Phone#  [phone]  Fax#  [phone]    Patient:  Marisol Fine  YOB: 1945  Phone:  [phone]  Date of Visit:  3/14/2022    Orders Placed This Encounter   Procedures    Wound cleansing and dressings     Wound cleansing and dressings  Left buttock wound     Wash your hands with soap and water  Remove old dressing, discard into plastic bag and place in trash  Cleanse the wound with soap and water prior to applying a clean dressing  Do not use tissue or cotton balls  Do not scrub the wound  Pat dry using gauze  Shower yes      Apply silvasorb to the left buttock wound  Cover with mepilex border lite  Alternate with gauze and tape  Change dressing daily     Standing Status:   Future     Standing Expiration Date:   3/14/2023    Wound off loading     Continue to offload pressure to wound       Standing Status:   Future     Standing Expiration Date:   3/14/2023    Wound home care     [facility]     Standing Status:   Future     Standing Expiration Date:   3/14/2023         Electronically signed by Niki Venegas DO

## 2022-03-14 NOTE — PROGRESS NOTES
Patient ID: Sal Keenan  is a 69 y o  male Date of Birth 1945     Chief Complaint  Chief Complaint   Patient presents with    Follow Up Wound Care Visit     Buttock       Allergies  Gabapentin    Assessment:    No problem-specific Assessment & Plan notes found for this encounter  Diagnoses and all orders for this visit:    Non-pressure chronic ulcer of buttock, unspecified ulcer stage (HCC)  -     lidocaine (XYLOCAINE) 4 % topical solution 5 mL  -     Cancel: Wound cleansing and dressings; Future  -     Wound off loading; Future  -     Wound home care; Future  -     Wound cleansing and dressings; Future    Cellulitis and abscess of buttock    Cigarette nicotine dependence without complication              Procedures    Plan:    Wound is much improved, almost closed  No clinical signs infection  No debridement today  Continue wound management with [facility] Sorb gel, see wound orders below  Minimize pressure to the area  Smoking cessation  Followup in 1 week or call sooner with questions or concerns    Wound 02/24/22 Abscess Buttocks Left (Active)   Wound Image Images linked 03/14/22 0954   Wound Description Granulation tissue;Slough; Epithelialization 03/14/22 0954   Freda-wound Assessment Intact 03/14/22 0954   Wound Length (cm) 1 2 cm 03/14/22 0954   Wound Width (cm) 0 2 cm 03/14/22 0954   Wound Depth (cm) 0 2 cm 03/14/22 0954   Wound Surface Area (cm^2) 0 24 cm^2 03/14/22 0954   Wound Volume (cm^3) 0 048 cm^3 03/14/22 0954   Calculated Wound Volume (cm^3) 0 05 cm^3 03/14/22 0954   Change in Wound Size % 54 55 03/14/22 0954   Drainage Amount Scant 03/14/22 0954   Drainage Description Serosanguineous 03/14/22 0954   Non-staged Wound Description Full thickness 03/14/22 0954   Wound packed?  No 03/14/22 0954   Dressing Status Intact 03/14/22 0954       Wound 02/24/22 Abscess Buttocks Left (Active)   Date First Assessed/Time First Assessed: 02/24/22 1012   Pre-Existing Wound: Yes  Primary Wound Type: Abscess  Location: Buttocks  Wound Location Orientation: Left       [REMOVED] Wound 02/24/22 Abscess Buttocks Distal (Removed)   Resolved Date/Resolved Time: 03/03/22 1401  Date First Assessed/Time First Assessed: 02/24/22 1043   Pre-Existing Wound: Yes  Primary Wound Type: Abscess  Location: Buttocks  Wound Location Orientation: Distal  Wound Outcome: Healed       Subjective:        Patient presents for followup of wound left buttock  No increased pain or drainage  Has been using [employer] Sorb gel on the wound  The following portions of the patient's history were reviewed and updated as appropriate:   He  has a past medical history of Acute myocardial infarction ([facility] [address] ), Anxiety, Boil of buttock, Cellulitis of fifth toe, left, Central obesity, and Ulcer of toe of left foot ([facility] [address] )    He   Patient Active Problem List    Diagnosis Date Noted    Non-pressure chronic ulcer of buttock ([facility] [address] ) 02/24/2022    Gallbladder sludge 02/06/2022    ICD (implantable cardioverter-defibrillator) battery depletion 02/06/2022    On amiodarone therapy 02/06/2022    Ventricular tachycardia ([facility] [address] ) 02/06/2022    Cellulitis and abscess of buttock 10/20/2021    Fall with injury 04/12/2021    Acute bilateral low back pain with bilateral sciatica 04/12/2021    Long-term use of high-risk medication 11/21/3876    Uncomplicated opioid dependence ([facility] [address] ) 04/09/2021    Need for vaccination 11/27/2020    Biventricular automatic implantable cardioverter defibrillator in situ 02/12/2020    Transient ischemic attack 02/12/2020    Chronic systolic (congestive) heart failure ([facility] [address] ) 02/12/2020    Disorder of implanted defibrillator generator 02/12/2020    Dyslipidemia 02/12/2020    Gallbladder disease 02/12/2020    Hypersomnia 02/12/2020    Non-rheumatic mitral regurgitation 02/12/2020    Old myocardial infarction 02/12/2020    Other long term (current) drug therapy 02/12/2020    Paroxysmal ventricular tachycardia ([facility] [address] ) 02/12/2020    Personal history of transient ischemic attack (TIA), and cerebral infarction without residual deficits 02/12/2020    Tobacco use 02/12/2020    Ventricular arrhythmia 02/12/2020    Chronic kidney disease, stage 4 (severe) ([facility] ) 02/12/2020    Coronary artery disease of native heart with stable angina pectoris ([facility] ) 02/12/2020    Vasomotor rhinitis 93/69/0670    Umbilical hernia 67/31/8973    Overweight (BMI 25 0-29 9) 06/18/2019    Medicare annual wellness visit, subsequent 06/18/2019    Screening for colon cancer 06/18/2019    Muscle spasm 06/18/2019    Iron deficiency anemia secondary to inadequate dietary iron intake 06/15/2018    Acute exacerbation of chronic obstructive bronchitis (HCC) 06/03/2017    Chronic bronchitis ([place] [address] ) 06/02/2017    Edema eyelid 04/11/2017    Pain of fifth toe 04/11/2017    Ulceration 04/11/2017    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[facility]) 03/09/2017    Type 2 diabetes mellitus with diabetic polyneuropathy, without long-term current use of insulin ([place] [address] ) 03/01/2017    Peripheral vascular disease ([place] [address] ) 02/10/2017    Left foot pain 01/09/2017    Right foot pain 01/09/2017    Right hemisphere, cerebral infarction ([place] [address] ) 10/18/2016    Lumbar radiculopathy 08/30/2016    Right hip pain 08/30/2016    Stage 3 chronic kidney disease 04/08/2016    Vitamin D deficiency 04/08/2016    Degenerative cervical disc 09/02/2015    Neural foraminal stenosis of cervical spine 09/02/2015    Neck pain 08/28/2015    Muscle spasms of neck 08/28/2015    Chronic pain 10/31/2014    Kidney cysts 10/13/2014    Chondromalacia of patella, right 09/25/2014    Primary osteoarthritis of right knee 09/25/2014    Constipation 09/22/2014    Right knee pain 09/19/2014    Solitary lung nodule 09/19/2014    Abnormal weight loss 08/30/2014    Dysphagia 08/30/2014    Fatigue 08/30/2014    Hoarseness 08/30/2014    Vallecular mass 08/29/2014    Acute right lumbar radiculopathy 07/23/2014    Lumbosacral spondylosis 07/23/2014    Cigarette nicotine dependence without complication 99/47/0034    Chronic bilateral low back pain with bilateral sciatica 07/02/2014    Neuralgia 07/02/2014    Ischemic cardiomyopathy 02/25/2014    Lower abdominal pain 02/25/2014    Onychomycosis of toenail 02/25/2014    Allergic rhinitis 05/06/2013    Shortness of breath 01/07/2013    Anxiety 08/31/2012    Esophageal reflux 08/31/2012    Coronary arteriosclerosis in native artery 07/06/2012    Chronic obstructive pulmonary disease ([facility] [address] ) 07/06/2012    Hyperlipidemia 07/06/2012    Essential hypertension 07/06/2012    Other chronic pain 06/28/2012    Benign prostatic hyperplasia 06/12/2012     He  reports that he has been smoking  He has been smoking about 1 00 pack per day  He has never used smokeless tobacco  He reports previous alcohol use  He reports that he does not use drugs  He is allergic to gabapentin       Review of Systems   Constitutional: Negative for chills and fever  HENT: Negative for congestion and sneezing  Respiratory: Negative for cough  Skin: Positive for wound  Psychiatric/Behavioral: Negative for agitation  Objective:       Wound 02/24/22 Abscess Buttocks Left (Active)   Wound Image Images linked 03/14/22 0954   Wound Description Granulation tissue;Slough; Epithelialization 03/14/22 0954   Freda-wound Assessment Intact 03/14/22 0954   Wound Length (cm) 1 2 cm 03/14/22 0954   Wound Width (cm) 0 2 cm 03/14/22 0954   Wound Depth (cm) 0 2 cm 03/14/22 0954   Wound Surface Area (cm^2) 0 24 cm^2 03/14/22 0954   Wound Volume (cm^3) 0 048 cm^3 03/14/22 0954   Calculated Wound Volume (cm^3) 0 05 cm^3 03/14/22 0954   Change in Wound Size % 54 55 03/14/22 0954   Drainage Amount Scant 03/14/22 0954   Drainage Description Serosanguineous 03/14/22 0954   Non-staged Wound Description Full thickness 03/14/22 0954   Wound packed?  No 03/14/22 0954   Dressing Status Intact 03/14/22 0954       /66 Pulse 66   Temp (!) 97 3 °F (36 3 °C)   Resp 16     Physical Exam  Constitutional:       General: He is not in acute distress  Appearance: Normal appearance  He is not ill-appearing, toxic-appearing or diaphoretic  HENT:      Head: Normocephalic and atraumatic  Right Ear: External ear normal       Left Ear: External ear normal    Eyes:      Conjunctiva/sclera: Conjunctivae normal    Pulmonary:      Effort: Pulmonary effort is normal  No respiratory distress  Musculoskeletal:      Cervical back: Neck supple  Skin:     Comments: See wound assessment   Neurological:      Mental Status: He is alert  Psychiatric:         Mood and Affect: Mood normal          Behavior: Behavior normal            Wound Instructions:  Orders Placed This Encounter   Procedures    Wound off loading     Continue to offload pressure to wound  Standing Status:   Future     Standing Expiration Date:   3/14/2023    Wound home care     [facility]     Standing Status:   Future     Standing Expiration Date:   3/14/2023    Wound cleansing and dressings     Wound cleansing and dressings  Left buttock wound     Wash your hands with soap and water  Remove old dressing, discard into plastic bag and place in trash  Cleanse the wound with soap and water prior to applying a clean dressing  Do not use tissue or cotton balls  Do not scrub the wound  Pat dry using gauze  Shower yes      Apply silvasorb to the left buttock wound  Cover with mepilex border lite  Alternate with gauze and tape  Change dressing daily    Same dressing as above applied at visit today  Standing Status:   Future     Standing Expiration Date:   3/14/2023        Diagnosis ICD-10-CM Associated Orders   1  Non-pressure chronic ulcer of buttock, unspecified ulcer stage ([facility])  L98 419 lidocaine (XYLOCAINE) 4 % topical solution 5 mL     Wound off loading     Wound home care     Wound cleansing and dressings   2   Cellulitis and abscess of buttock  L02 31 L03 317    3   Cigarette nicotine dependence without complication  A76 389

## 2022-03-21 ENCOUNTER — OFFICE VISIT (OUTPATIENT)
Dept: WOUND CARE | Facility: HOSPITAL | Age: 77
End: 2022-03-21
Payer: COMMERCIAL

## 2022-03-21 VITALS
TEMPERATURE: 97.5 F | RESPIRATION RATE: 16 BRPM | DIASTOLIC BLOOD PRESSURE: 74 MMHG | SYSTOLIC BLOOD PRESSURE: 128 MMHG | HEART RATE: 76 BPM

## 2022-03-21 DIAGNOSIS — F41.9 ANXIETY: ICD-10-CM

## 2022-03-21 DIAGNOSIS — L98.419: Primary | ICD-10-CM

## 2022-03-21 DIAGNOSIS — L02.31 CELLULITIS AND ABSCESS OF BUTTOCK: ICD-10-CM

## 2022-03-21 DIAGNOSIS — F17.210 CIGARETTE NICOTINE DEPENDENCE WITHOUT COMPLICATION: ICD-10-CM

## 2022-03-21 DIAGNOSIS — G89.4 CHRONIC PAIN SYNDROME: ICD-10-CM

## 2022-03-21 DIAGNOSIS — L03.317 CELLULITIS AND ABSCESS OF BUTTOCK: ICD-10-CM

## 2022-03-21 PROCEDURE — 99213 OFFICE O/P EST LOW 20 MIN: CPT | Performed by: FAMILY MEDICINE

## 2022-03-21 RX ORDER — OXYCODONE HYDROCHLORIDE 30 MG/1
30 TABLET ORAL EVERY 4 HOURS PRN
Qty: 120 TABLET | Refills: 0 | Status: SHIPPED | OUTPATIENT
Start: 2022-03-21 | End: 2022-04-08 | Stop reason: SDUPTHER

## 2022-03-21 RX ORDER — LORAZEPAM 0.5 MG/1
0.5 TABLET ORAL EVERY 8 HOURS PRN
Qty: 90 TABLET | Refills: 0 | Status: SHIPPED | OUTPATIENT
Start: 2022-03-21 | End: 2022-05-02 | Stop reason: SDUPTHER

## 2022-03-21 RX ORDER — LIDOCAINE HYDROCHLORIDE 40 MG/ML
5 SOLUTION TOPICAL ONCE
Status: COMPLETED | OUTPATIENT
Start: 2022-03-21 | End: 2022-03-21

## 2022-03-21 RX ADMIN — LIDOCAINE HYDROCHLORIDE 5 ML: 40 SOLUTION TOPICAL at 11:20

## 2022-03-21 NOTE — PROGRESS NOTES
Patient ID: Akhil Iqbal  is a 69 y o  male Date of Birth 1945     Chief Complaint  Chief Complaint   Patient presents with    Follow Up Wound Care Visit     wound care       Allergies  Gabapentin    Assessment:    No problem-specific Assessment & Plan notes found for this encounter  Diagnoses and all orders for this visit:    Non-pressure chronic ulcer of buttock, unspecified ulcer stage ([address] )  -     Wound cleansing and dressings; Future  -     lidocaine (XYLOCAINE) 4 % topical solution 5 mL    Cellulitis and abscess of buttock    Cigarette nicotine dependence without complication              Procedures    Plan:   Wound is essentially unchanged  Change wound management to woundres, see wound orders below  Pressure relief  Stop smoking  Followup in 1 week or call sooner with questions/concerns    Wound 02/24/22 Abscess Buttocks Left (Active)   Wound Image Images linked 03/21/22 1116   Wound Description Granulation tissue 03/21/22 1116   Freda-wound Assessment Intact 03/21/22 1116   Wound Length (cm) 1 2 cm 03/21/22 1116   Wound Width (cm) 0 2 cm 03/21/22 1116   Wound Depth (cm) 0 2 cm 03/21/22 1116   Wound Surface Area (cm^2) 0 24 cm^2 03/21/22 1116   Wound Volume (cm^3) 0 048 cm^3 03/21/22 1116   Calculated Wound Volume (cm^3) 0 05 cm^3 03/21/22 1116   Change in Wound Size % 54 55 03/21/22 1116   Drainage Amount Scant 03/21/22 1116   Drainage Description Serosanguineous 03/21/22 1116   Non-staged Wound Description Full thickness 03/21/22 1116   Treatments Cleansed 03/21/22 1116   Wound packed?  No 03/21/22 1116   Dressing Changed Changed 03/21/22 1116   Patient Tolerance Tolerated well 03/21/22 1116   Dressing Status Intact 03/21/22 1116       Wound 02/24/22 Abscess Buttocks Left (Active)   Date First Assessed/Time First Assessed: 02/24/22 1012   Pre-Existing Wound: Yes  Primary Wound Type: Abscess  Location: Buttocks  Wound Location Orientation: Left       [REMOVED] Wound 02/24/22 Abscess Buttocks Distal (Removed)   Resolved Date/Resolved Time: 03/03/22 1401  Date First Assessed/Time First Assessed: 02/24/22 1043   Pre-Existing Wound: Yes  Primary Wound Type: Abscess  Location: Buttocks  Wound Location Orientation: Distal  Wound Outcome: Healed       Subjective:        Patient presents for followup of wound on the left buttock  No increased pain or drainage  Has been using silvasorb gel on the wound and trying to keep pressure off the area  The following portions of the patient's history were reviewed and updated as appropriate:   He  has a past medical history of Acute myocardial infarction ([facility] ), Anxiety, Boil of buttock, Cellulitis of fifth toe, left, Central obesity, and Ulcer of toe of left foot ([facility] )    He   Patient Active Problem List    Diagnosis Date Noted    Non-pressure chronic ulcer of buttock ([facility] ) 02/24/2022    Gallbladder sludge 02/06/2022    ICD (implantable cardioverter-defibrillator) battery depletion 02/06/2022    On amiodarone therapy 02/06/2022    Ventricular tachycardia ([facility] ) 02/06/2022    Cellulitis and abscess of buttock 10/20/2021    Fall with injury 04/12/2021    Acute bilateral low back pain with bilateral sciatica 04/12/2021    Long-term use of high-risk medication 63/01/0716    Uncomplicated opioid dependence ([facility] ) 04/09/2021    Need for vaccination 11/27/2020    Biventricular automatic implantable cardioverter defibrillator in situ 02/12/2020    Transient ischemic attack 02/12/2020    Chronic systolic (congestive) heart failure ([facility] ) 02/12/2020    Disorder of implanted defibrillator generator 02/12/2020    Dyslipidemia 02/12/2020    Gallbladder disease 02/12/2020    Hypersomnia 02/12/2020    Non-rheumatic mitral regurgitation 02/12/2020    Old myocardial infarction 02/12/2020    Other long term (current) drug therapy 02/12/2020    Paroxysmal ventricular tachycardia ([facility] ) 02/12/2020    Personal history of transient ischemic attack (TIA), and cerebral infarction without residual deficits 02/12/2020    Tobacco use 02/12/2020    Ventricular arrhythmia 02/12/2020    Chronic kidney disease, stage 4 (severe) [facility] ) 02/12/2020    Coronary artery disease of native heart with stable angina pectoris ([facility] ) 02/12/2020    Vasomotor rhinitis 60/18/6672    Umbilical hernia 26/54/0924    Overweight (BMI 25 0-29 9) 06/18/2019    Medicare annual wellness visit, subsequent 06/18/2019    Screening for colon cancer 06/18/2019    Muscle spasm 06/18/2019    Iron deficiency anemia secondary to inadequate dietary iron intake 06/15/2018    Acute exacerbation of chronic obstructive bronchitis (HCC) 06/03/2017    Chronic bronchitis ([facility] ) 06/02/2017    Edema eyelid 04/11/2017    Pain of fifth toe 04/11/2017    Ulceration 04/11/2017    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[facility]) 03/09/2017    Type 2 diabetes mellitus with diabetic polyneuropathy, without long-term current use of insulin ([facility] ) 03/01/2017    Peripheral vascular disease ([facility] ) 02/10/2017    Left foot pain 01/09/2017    Right foot pain 01/09/2017    Right hemisphere, cerebral infarction ([facility] ) 10/18/2016    Lumbar radiculopathy 08/30/2016    Right hip pain 08/30/2016    Stage 3 chronic kidney disease 04/08/2016    Vitamin D deficiency 04/08/2016    Degenerative cervical disc 09/02/2015    Neural foraminal stenosis of cervical spine 09/02/2015    Neck pain 08/28/2015    Muscle spasms of neck 08/28/2015    Chronic pain 10/31/2014    Kidney cysts 10/13/2014    Chondromalacia of patella, right 09/25/2014    Primary osteoarthritis of right knee 09/25/2014    Constipation 09/22/2014    Right knee pain 09/19/2014    Solitary lung nodule 09/19/2014    Abnormal weight loss 08/30/2014    Dysphagia 08/30/2014    Fatigue 08/30/2014    Hoarseness 08/30/2014    Vallecular mass 08/29/2014    Acute right lumbar radiculopathy 07/23/2014    Lumbosacral spondylosis 07/23/2014    Cigarette nicotine dependence without complication 60/37/8498    Chronic bilateral low back pain with bilateral sciatica 07/02/2014    Neuralgia 07/02/2014    Ischemic cardiomyopathy 02/25/2014    Lower abdominal pain 02/25/2014    Onychomycosis of toenail 02/25/2014    Allergic rhinitis 05/06/2013    Shortness of breath 01/07/2013    Anxiety 08/31/2012    Esophageal reflux 08/31/2012    Coronary arteriosclerosis in native artery 07/06/2012    Chronic obstructive pulmonary disease ([facility] ) 07/06/2012    Hyperlipidemia 07/06/2012    Essential hypertension 07/06/2012    Other chronic pain 06/28/2012    Benign prostatic hyperplasia 06/12/2012     He  reports that he has been smoking  He has been smoking about 1 00 pack per day  He has never used smokeless tobacco  He reports previous alcohol use  He reports that he does not use drugs    Current Outpatient Medications   Medication Sig Dispense Refill    amiodarone 100 mg tablet       amLODIPine (NORVASC) 2 5 mg tablet TAKE 1 TABLET BY MOUTH EVERY DAY 90 tablet 0    atenolol (TENORMIN) 25 mg tablet 1/2 tab daily 15 tablet 5    atorvastatin (LIPITOR) 80 mg tablet TAKE 1 TABLET BY MOUTH EVERY DAY 90 tablet 0    carisoprodol (SOMA) 350 mg tablet Take 1 tablet (350 mg total) by mouth 4 (four) times a day 120 tablet 0    clopidogrel (PLAVIX) 75 mg tablet TAKE 1 TABLET BY MOUTH EVERY DAY 90 tablet 1    enalapril (VASOTEC) 20 mg tablet TAKE 1 TABLET BY MOUTH EVERY DAY 90 tablet 2    famotidine (PEPCID) 20 mg tablet Take 1 tablet by mouth 2 (two) times a day      fluticasone (FLONASE) 50 mcg/act nasal spray SPRAY 2 SPRAYS INTO EACH NOSTRIL EVERY DAY 48 mL 0    furosemide (LASIX) 40 mg tablet Take 1 5 tablets (60 mg total) by mouth daily 135 tablet 0    ipratropium (ATROVENT) 0 06 % nasal spray 2 sprays into each nostril 4 (four) times a day 15 mL 5    lidocaine (XYLOCAINE) 5 % ointment Apply topically as needed for mild pain 50 g 0    LORazepam (ATIVAN) 0 5 mg tablet Take 1 tablet (0 5 mg total) by mouth every 8 (eight) hours as needed for anxiety 90 tablet 0    naloxone (NARCAN) 4 mg/0 1 mL nasal spray Administer 1 spray into a nostril  If breathing does not return to normal or if breathing difficulty resumes after 2-3 minutes, give another dose in the other nostril using a new spray  1 each 1    naloxone (NARCAN) 4 mg/0 1 mL nasal spray Administer 1 spray into a nostril  If breathing does not return to normal or if breathing difficulty resumes after 2-3 minutes, give another dose in the other nostril using a new spray  1 each 1    nitroglycerin (NITRODUR) 0 4 mg/hr Place 1 patch on the skin daily 90 patch 0    oxyCODONE (ROXICODONE) 30 MG immediate release tablet Take 1 tablet (30 mg total) by mouth every 4 (four) hours as needed for moderate pain Max Daily Amount: 180 mg 120 tablet 0    oxyCODONE ER (Xtampza ER) 9 MG C12A Take 1 each by mouth every 12 (twelve) hoursMax Daily Amount: 2 each 60 each 0    predniSONE 10 mg tablet Take by mouth daily      Symbicort 160-4 5 MCG/ACT inhaler TAKE 2 PUFFS BY MOUTH TWICE A DAY 30 6 g 3    tamsulosin (FLOMAX) 0 4 mg TAKE 1 CAPSULE BY MOUTH AT BEDTIME 90 capsule 3    Ventolin  (90 Base) MCG/ACT inhaler Inhale 2 puffs every 4 (four) hours as needed for wheezing 18 g 5     No current facility-administered medications for this visit  He is allergic to gabapentin       Review of Systems   Constitutional: Negative for chills and fever  HENT: Negative for congestion and sneezing  Respiratory: Negative for cough  Skin: Positive for wound  Psychiatric/Behavioral: Negative for agitation           Objective:       Wound 02/24/22 Abscess Buttocks Left (Active)   Wound Image Images linked 03/21/22 1116   Wound Description Granulation tissue 03/21/22 1116   Freda-wound Assessment Intact 03/21/22 1116   Wound Length (cm) 1 2 cm 03/21/22 1116   Wound Width (cm) 0 2 cm 03/21/22 1116   Wound Depth (cm) 0 2 cm 03/21/22 1116   Wound Surface Area (cm^2) 0 24 cm^2 03/21/22 1116   Wound Volume (cm^3) 0 048 cm^3 03/21/22 1116   Calculated Wound Volume (cm^3) 0 05 cm^3 03/21/22 1116   Change in Wound Size % 54 55 03/21/22 1116   Drainage Amount Scant 03/21/22 1116   Drainage Description Serosanguineous 03/21/22 1116   Non-staged Wound Description Full thickness 03/21/22 1116   Treatments Cleansed 03/21/22 1116   Wound packed? No 03/21/22 1116   Dressing Changed Changed 03/21/22 1116   Patient Tolerance Tolerated well 03/21/22 1116   Dressing Status Intact 03/21/22 1116       /74   Pulse 76   Temp 97 5 °F (36 4 °C)   Resp 16     Physical Exam  Constitutional:       General: He is not in acute distress  Appearance: Normal appearance  He is not ill-appearing, toxic-appearing or diaphoretic  HENT:      Head: Normocephalic and atraumatic  Right Ear: External ear normal       Left Ear: External ear normal    Eyes:      Conjunctiva/sclera: Conjunctivae normal    Pulmonary:      Effort: Pulmonary effort is normal  No respiratory distress  Musculoskeletal:      Cervical back: Neck supple  Skin:     Comments: See wound assessment   Neurological:      Mental Status: He is alert  Psychiatric:         Mood and Affect: Mood normal          Behavior: Behavior normal            Wound Instructions:  Orders Placed This Encounter   Procedures    Wound cleansing and dressings         Wound cleansing and dressings  Left buttock wound     Wash your hands with soap and water  Remove old dressing, discard into plastic bag and place in trash  Cleanse the wound with soap and water prior to applying a clean dressing  Do not use tissue or cotton balls  Do not scrub the wound  Pat dry using gauze  Shower yes      Apply WOUND DRES to the left buttock wound    Cover with mepilex border lite or  ALEVYN with border  Change dressing EVERY OTHER DAY    DONE TODAY  RETURN IN 1 WEEK        Standing Status:   Future     Standing Expiration Date:   3/21/2023 Diagnosis ICD-10-CM Associated Orders   1  Non-pressure chronic ulcer of buttock, unspecified ulcer stage (HCC)  L98 419 Wound cleansing and dressings     lidocaine (XYLOCAINE) 4 % topical solution 5 mL   2  Cellulitis and abscess of buttock  L02 31     L03 317    3   Cigarette nicotine dependence without complication  W35 946

## 2022-03-21 NOTE — PATIENT INSTRUCTIONS
Orders Placed This Encounter   Procedures    Wound cleansing and dressings         Wound cleansing and dressings  Left buttock wound     Wash your hands with soap and water  Remove old dressing, discard into plastic bag and place in trash  Cleanse the wound with soap and water prior to applying a clean dressing  Do not use tissue or cotton balls  Do not scrub the wound  Pat dry using gauze  Shower yes      Apply WOUND DRES to the left buttock wound    Cover with mepilex border lite or  ALEVYN with border  Change dressing EVERY OTHER DAY    DONE TODAY  RETURN IN 1 WEEK        Standing Status:   Future     Standing Expiration Date:   3/21/2023

## 2022-03-21 NOTE — LETTER
[address] [facility]  [address]  [address]  Phone#  [phone]  Fax#  [phone]    Patient:  Mi Canada  YOB: 1945  Phone:  [phone]  Date of Visit:  3/21/2022    Orders Placed This Encounter   Procedures    Wound cleansing and dressings         Wound cleansing and dressings  Left buttock wound     Wash your hands with soap and water  Remove old dressing, discard into plastic bag and place in trash  Cleanse the wound with soap and water prior to applying a clean dressing  Do not use tissue or cotton balls  Do not scrub the wound  Pat dry using gauze  Shower yes      Apply WOUND DRES to the left buttock wound    Cover with mepilex border lite or  ALEVYN with border  Change dressing EVERY OTHER DAY    DONE TODAY  RETURN IN 1 WEEK        Standing Status:   Future     Standing Expiration Date:   3/21/2023         Electronically signed by Stephanie Reyes DO

## 2022-03-22 DIAGNOSIS — I10 ESSENTIAL HYPERTENSION: ICD-10-CM

## 2022-03-23 RX ORDER — FUROSEMIDE 40 MG/1
TABLET ORAL
Qty: 45 TABLET | Refills: 0 | Status: SHIPPED | OUTPATIENT
Start: 2022-03-23 | End: 2022-03-24 | Stop reason: SDUPTHER

## 2022-03-28 ENCOUNTER — OFFICE VISIT (OUTPATIENT)
Dept: WOUND CARE | Facility: HOSPITAL | Age: 77
End: 2022-03-28
Payer: COMMERCIAL

## 2022-03-28 VITALS
TEMPERATURE: 97.4 F | RESPIRATION RATE: 16 BRPM | DIASTOLIC BLOOD PRESSURE: 74 MMHG | HEART RATE: 76 BPM | SYSTOLIC BLOOD PRESSURE: 124 MMHG

## 2022-03-28 DIAGNOSIS — L98.419: Primary | ICD-10-CM

## 2022-03-28 PROCEDURE — 99212 OFFICE O/P EST SF 10 MIN: CPT | Performed by: FAMILY MEDICINE

## 2022-03-28 RX ORDER — LIDOCAINE HYDROCHLORIDE 40 MG/ML
5 SOLUTION TOPICAL ONCE
Status: COMPLETED | OUTPATIENT
Start: 2022-03-28 | End: 2022-03-28

## 2022-03-28 RX ADMIN — LIDOCAINE HYDROCHLORIDE 5 ML: 40 SOLUTION TOPICAL at 10:27

## 2022-03-28 NOTE — PATIENT INSTRUCTIONS
Orders Placed This Encounter   Procedures    Wound cleansing and dressings     Discharge today   Wound healed       Standing Status:   Future     Standing Expiration Date:   3/28/2023

## 2022-03-28 NOTE — PROGRESS NOTES
Patient ID: Evan Gruber  is a 69 y o  male Date of Birth 1945     Chief Complaint  Chief Complaint   Patient presents with    Follow Up Wound Care Visit     wound care       Allergies  Gabapentin    Assessment:    No problem-specific Assessment & Plan notes found for this encounter  Diagnoses and all orders for this visit:    Non-pressure chronic ulcer of buttock, unspecified ulcer stage ([address] )  -     Wound cleansing and dressings; Future  -     lidocaine (XYLOCAINE) 4 % topical solution 5 mL              Procedures    Plan:   Wound is closed  Followup here in the wound management center p r n  Or call with questions or concerns    Wound 02/24/22 Abscess Buttocks Left (Active)   Wound Image Images linked 03/28/22 1018   Wound Description Epithelialization 03/28/22 1017   Freda-wound Assessment Intact 03/28/22 1017   Wound Length (cm) 0 cm 03/28/22 1017   Wound Width (cm) 0 cm 03/28/22 1017   Wound Depth (cm) 0 cm 03/28/22 1017   Wound Surface Area (cm^2) 0 cm^2 03/28/22 1017   Wound Volume (cm^3) 0 cm^3 03/28/22 1017   Calculated Wound Volume (cm^3) 0 cm^3 03/28/22 1017   Change in Wound Size % 100 03/28/22 1017   Drainage Amount None 03/28/22 1017   Non-staged Wound Description Not applicable 98/99/64 2507   Treatments Cleansed 03/28/22 1017   Wound packed?  No 03/28/22 1017   Dressing Changed Changed 03/28/22 1017   Patient Tolerance Tolerated well 03/28/22 1017   Dressing Status Intact 03/28/22 1017       Wound 02/24/22 Abscess Buttocks Left (Active)   Date First Assessed/Time First Assessed: 02/24/22 1012   Pre-Existing Wound: Yes  Primary Wound Type: Abscess  Location: Buttocks  Wound Location Orientation: Left       [REMOVED] Wound 02/24/22 Abscess Buttocks Distal (Removed)   Resolved Date/Resolved Time: 03/03/22 1401  Date First Assessed/Time First Assessed: 02/24/22 1043   Pre-Existing Wound: Yes  Primary Wound Type: Abscess  Location: Buttocks  Wound Location Orientation: Distal  Wound Outcome: Healed       Subjective:        Patient presents for followup of wound on the left buttock  No increased pain or drainage  Has been using woundres on the wound and trying to keep pressure off the area  He reports that he spends his time either sitting or lying in bed either on his back or on his sides  Admits to currently smoking about half pack per day      The following portions of the patient's history were reviewed and updated as appropriate: He  has a past medical history of Acute myocardial infarction ([address] ), Anxiety, Boil of buttock, Cellulitis of fifth toe, left, Central obesity, and Ulcer of toe of left foot ([address] )  He  reports that he has been smoking  He has been smoking about 1 00 pack per day  He has never used smokeless tobacco  He reports previous alcohol use  He reports that he does not use drugs  He is allergic to gabapentin       Review of Systems   Constitutional: Negative for chills and fever  HENT: Negative for congestion and sneezing  Respiratory: Negative for cough  Musculoskeletal: Positive for gait problem  Skin: Positive for wound  Psychiatric/Behavioral: Negative for agitation  Objective:       Wound 02/24/22 Abscess Buttocks Left (Active)   Wound Image Images linked 03/28/22 1018   Wound Description Epithelialization 03/28/22 1017   Freda-wound Assessment Intact 03/28/22 1017   Wound Length (cm) 0 cm 03/28/22 1017   Wound Width (cm) 0 cm 03/28/22 1017   Wound Depth (cm) 0 cm 03/28/22 1017   Wound Surface Area (cm^2) 0 cm^2 03/28/22 1017   Wound Volume (cm^3) 0 cm^3 03/28/22 1017   Calculated Wound Volume (cm^3) 0 cm^3 03/28/22 1017   Change in Wound Size % 100 03/28/22 1017   Drainage Amount None 03/28/22 1017   Non-staged Wound Description Not applicable 74/05/56 8084   Treatments Cleansed 03/28/22 1017   Wound packed?  No 03/28/22 1017   Dressing Changed Changed 03/28/22 1017   Patient Tolerance Tolerated well 03/28/22 1017   Dressing Status Intact 03/28/22 1017       /74   Pulse 76   Temp (!) 97 4 °F (36 3 °C)   Resp 16     Physical Exam  Constitutional:       General: He is not in acute distress  Appearance: Normal appearance  He is obese  He is not ill-appearing, toxic-appearing or diaphoretic  HENT:      Head: Normocephalic and atraumatic  Right Ear: External ear normal       Left Ear: External ear normal    Eyes:      Conjunctiva/sclera: Conjunctivae normal    Pulmonary:      Effort: Pulmonary effort is normal  No respiratory distress  Musculoskeletal:      Cervical back: Neck supple  Skin:     Comments: Wound appears closed   Neurological:      Mental Status: He is alert  Gait: Gait abnormal (uses cane)  Psychiatric:         Mood and Affect: Mood normal          Behavior: Behavior normal            Wound Instructions:  Orders Placed This Encounter   Procedures    Wound cleansing and dressings     Discharge today   Wound healed  Standing Status:   Future     Standing Expiration Date:   3/28/2023        Diagnosis ICD-10-CM Associated Orders   1   Non-pressure chronic ulcer of buttock, unspecified ulcer stage ([facility])  L98 419 Wound cleansing and dressings     lidocaine (XYLOCAINE) 4 % topical solution 5 mL

## 2022-03-28 NOTE — LETTER
[address] [facility]  [address]  [address]  Phone#  [phone]  Fax#  [phone]    Patient:  Bibi Buck  YOB: 1945  Phone:  [phone]  Date of Visit:  3/28/2022    Orders Placed This Encounter   Procedures    Wound cleansing and dressings     Discharge today   Wound healed       Standing Status:   Future     Standing Expiration Date:   3/28/2023         Electronically signed by Francis Kinsey DO

## 2022-04-01 DIAGNOSIS — J44.9 CHRONIC OBSTRUCTIVE PULMONARY DISEASE, UNSPECIFIED COPD TYPE (HCC): ICD-10-CM

## 2022-04-01 DIAGNOSIS — J44.9 CHRONIC OBSTRUCTIVE PULMONARY DISEASE, UNSPECIFIED COPD TYPE (HCC): Primary | ICD-10-CM

## 2022-04-01 RX ORDER — IPRATROPIUM BROMIDE AND ALBUTEROL SULFATE 2.5; .5 MG/3ML; MG/3ML
SOLUTION RESPIRATORY (INHALATION)
Status: CANCELLED | OUTPATIENT
Start: 2022-04-01

## 2022-04-01 RX ORDER — IPRATROPIUM BROMIDE AND ALBUTEROL SULFATE 2.5; .5 MG/3ML; MG/3ML
3 SOLUTION RESPIRATORY (INHALATION) EVERY 6 HOURS PRN
Qty: 120 ML | Refills: 2 | Status: CANCELLED | OUTPATIENT
Start: 2022-04-01

## 2022-04-01 RX ORDER — IPRATROPIUM BROMIDE AND ALBUTEROL SULFATE 2.5; .5 MG/3ML; MG/3ML
SOLUTION RESPIRATORY (INHALATION)
COMMUNITY
Start: 2022-01-29 | End: 2022-04-01 | Stop reason: SDUPTHER

## 2022-04-01 RX ORDER — IPRATROPIUM BROMIDE AND ALBUTEROL SULFATE 2.5; .5 MG/3ML; MG/3ML
3 SOLUTION RESPIRATORY (INHALATION) EVERY 6 HOURS PRN
Qty: 120 ML | Refills: 2 | Status: SHIPPED | OUTPATIENT
Start: 2022-04-01

## 2022-04-04 DIAGNOSIS — G89.4 CHRONIC PAIN SYNDROME: ICD-10-CM

## 2022-04-04 DIAGNOSIS — M62.838 MUSCLE SPASM: ICD-10-CM

## 2022-04-04 RX ORDER — CARISOPRODOL 350 MG/1
350 TABLET ORAL 4 TIMES DAILY
Qty: 120 TABLET | Refills: 0 | Status: CANCELLED | OUTPATIENT
Start: 2022-04-04

## 2022-04-05 DIAGNOSIS — M62.838 MUSCLE SPASM: ICD-10-CM

## 2022-04-05 RX ORDER — OXYCODONE HYDROCHLORIDE 30 MG/1
30 TABLET ORAL EVERY 4 HOURS PRN
Qty: 120 TABLET | Refills: 0 | OUTPATIENT
Start: 2022-04-05

## 2022-04-06 RX ORDER — CARISOPRODOL 350 MG/1
350 TABLET ORAL 4 TIMES DAILY
Qty: 120 TABLET | Refills: 0 | Status: SHIPPED | OUTPATIENT
Start: 2022-04-06 | End: 2022-05-19 | Stop reason: SDUPTHER

## 2022-05-02 DIAGNOSIS — F41.9 ANXIETY: ICD-10-CM

## 2022-05-02 DIAGNOSIS — G89.4 CHRONIC PAIN SYNDROME: ICD-10-CM

## 2022-05-02 RX ORDER — LORAZEPAM 0.5 MG/1
0.5 TABLET ORAL EVERY 8 HOURS PRN
Qty: 90 TABLET | Refills: 0 | Status: SHIPPED | OUTPATIENT
Start: 2022-05-02 | End: 2022-05-31 | Stop reason: SDUPTHER

## 2022-05-02 RX ORDER — OXYCODONE HYDROCHLORIDE 30 MG/1
30 TABLET ORAL EVERY 4 HOURS PRN
Qty: 120 TABLET | Refills: 0 | Status: SHIPPED | OUTPATIENT
Start: 2022-05-02 | End: 2022-05-13 | Stop reason: SDUPTHER

## 2022-05-13 DIAGNOSIS — G89.4 CHRONIC PAIN SYNDROME: ICD-10-CM

## 2022-05-19 DIAGNOSIS — M62.838 MUSCLE SPASM: ICD-10-CM

## 2022-05-20 RX ORDER — CARISOPRODOL 350 MG/1
350 TABLET ORAL 4 TIMES DAILY
Qty: 120 TABLET | Refills: 0 | Status: SHIPPED | OUTPATIENT
Start: 2022-05-20 | End: 2022-06-27 | Stop reason: SDUPTHER

## 2022-05-23 ENCOUNTER — RA CDI HCC (OUTPATIENT)
Dept: OTHER | Facility: HOSPITAL | Age: 77
End: 2022-05-23

## 2022-05-23 RX ORDER — OXYCODONE HYDROCHLORIDE 30 MG/1
30 TABLET ORAL EVERY 4 HOURS PRN
Qty: 120 TABLET | Refills: 0 | Status: SHIPPED | OUTPATIENT
Start: 2022-05-23 | End: 2022-06-20 | Stop reason: SDUPTHER

## 2022-05-23 NOTE — PROGRESS NOTES
[facility]  coding opportunities          Chart Reviewed number of suggestions sent to Provider: 4    E11 22  E11 51  E11 622  I13 0     Patients Insurance     Medicare Insurance: [facility]

## 2022-05-31 ENCOUNTER — OFFICE VISIT (OUTPATIENT)
Dept: FAMILY MEDICINE CLINIC | Facility: CLINIC | Age: 77
End: 2022-05-31
Payer: COMMERCIAL

## 2022-05-31 VITALS
HEIGHT: 72 IN | HEART RATE: 56 BPM | WEIGHT: 174 LBS | SYSTOLIC BLOOD PRESSURE: 140 MMHG | DIASTOLIC BLOOD PRESSURE: 70 MMHG | BODY MASS INDEX: 23.57 KG/M2 | TEMPERATURE: 97.3 F | OXYGEN SATURATION: 97 %

## 2022-05-31 DIAGNOSIS — I10 ESSENTIAL HYPERTENSION: ICD-10-CM

## 2022-05-31 DIAGNOSIS — I25.118 CORONARY ARTERY DISEASE OF NATIVE HEART WITH STABLE ANGINA PECTORIS, UNSPECIFIED VESSEL OR LESION TYPE (HCC): ICD-10-CM

## 2022-05-31 DIAGNOSIS — F41.9 ANXIETY: ICD-10-CM

## 2022-05-31 DIAGNOSIS — E11.42 TYPE 2 DIABETES MELLITUS WITH DIABETIC POLYNEUROPATHY, WITHOUT LONG-TERM CURRENT USE OF INSULIN (HCC): ICD-10-CM

## 2022-05-31 DIAGNOSIS — J44.9 CHRONIC OBSTRUCTIVE PULMONARY DISEASE, UNSPECIFIED COPD TYPE (HCC): ICD-10-CM

## 2022-05-31 DIAGNOSIS — N18.4 CHRONIC KIDNEY DISEASE, STAGE 4 (SEVERE) (HCC): ICD-10-CM

## 2022-05-31 DIAGNOSIS — F11.20 UNCOMPLICATED OPIOID DEPENDENCE (HCC): ICD-10-CM

## 2022-05-31 DIAGNOSIS — F11.20 CONTINUOUS OPIOID DEPENDENCE (HCC): Primary | ICD-10-CM

## 2022-05-31 DIAGNOSIS — F17.210 CIGARETTE NICOTINE DEPENDENCE WITHOUT COMPLICATION: ICD-10-CM

## 2022-05-31 DIAGNOSIS — G89.4 CHRONIC PAIN SYNDROME: ICD-10-CM

## 2022-05-31 DIAGNOSIS — I74.9 ARTERIAL EMBOLISM (HCC): ICD-10-CM

## 2022-05-31 DIAGNOSIS — I47.2 VENTRICULAR TACHYCARDIA (HCC): ICD-10-CM

## 2022-05-31 DIAGNOSIS — I50.22 CHRONIC SYSTOLIC (CONGESTIVE) HEART FAILURE (HCC): ICD-10-CM

## 2022-05-31 PROBLEM — Z23 NEED FOR VACCINATION: Status: RESOLVED | Noted: 2020-11-27 | Resolved: 2022-05-31

## 2022-05-31 PROBLEM — Z00.00 MEDICARE ANNUAL WELLNESS VISIT, SUBSEQUENT: Status: RESOLVED | Noted: 2019-06-18 | Resolved: 2022-05-31

## 2022-05-31 LAB — SL AMB POCT HEMOGLOBIN AIC: 5.6 (ref ?–6.5)

## 2022-05-31 PROCEDURE — 83036 HEMOGLOBIN GLYCOSYLATED A1C: CPT | Performed by: FAMILY MEDICINE

## 2022-05-31 PROCEDURE — 99214 OFFICE O/P EST MOD 30 MIN: CPT | Performed by: FAMILY MEDICINE

## 2022-05-31 RX ORDER — LORAZEPAM 0.5 MG/1
0.5 TABLET ORAL EVERY 8 HOURS PRN
Qty: 90 TABLET | Refills: 0 | Status: SHIPPED | OUTPATIENT
Start: 2022-05-31 | End: 2022-06-27 | Stop reason: SDUPTHER

## 2022-05-31 RX ORDER — AMIODARONE HYDROCHLORIDE 200 MG/1
TABLET ORAL
COMMUNITY
Start: 2022-05-11

## 2022-05-31 RX ORDER — METOPROLOL SUCCINATE 25 MG/1
TABLET, EXTENDED RELEASE ORAL
COMMUNITY
Start: 2022-04-11

## 2022-05-31 NOTE — PATIENT INSTRUCTIONS

## 2022-05-31 NOTE — PROGRESS NOTES
Patient's shoes and socks removed  Right Foot/Ankle   Right Foot Inspection  Skin Exam: skin normal and skin intact  No dry skin, no warmth, no callus, no erythema, no maceration, no abnormal color, no pre-ulcer, no ulcer and no callus  Toe Exam: ROM and strength within normal limits  Sensory   Monofilament testing: diminished    Vascular  Capillary refills: elevated  The right DP pulse is 1+  The right PT pulse is 1+  Left Foot/Ankle  Left Foot Inspection  Skin Exam: skin normal and skin intact  No dry skin, no warmth, no erythema, no maceration, normal color, no pre-ulcer, no ulcer and no callus  Toe Exam: ROM and strength within normal limits  Sensory   Monofilament testing: diminished    Vascular  Capillary refills: elevated  The left DP pulse is 1+  The left PT pulse is 1+       Assign Risk Category  No deformity present  Loss of protective sensation  Weak pulses  Risk: 2        Assessment/Plan     Problem List Items Addressed This Visit        Endocrine    Type 2 diabetes mellitus with diabetic polyneuropathy, without long-term current use of insulin (HCC)       Lab Results   Component Value Date    HGBA1C 5 6 05/31/2022   controlled, dietarily           Relevant Orders    POCT hemoglobin A1c (Completed)    Comprehensive metabolic panel       Respiratory    Chronic obstructive pulmonary disease (HCC)     Stable, continues to smoke           Relevant Medications    Ventolin  (90 Base) MCG/ACT inhaler    Other Relevant Orders    CBC and differential       Cardiovascular and Mediastinum    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HCC)    Essential hypertension     Controlled, continue current medication           Relevant Medications    metoprolol succinate (TOPROL-XL) 25 mg 24 hr tablet    Other Relevant Orders    CBC and differential    Comprehensive metabolic panel    Lipid Panel with Direct LDL reflex    TSH, 3rd generation with Free T4 reflex    Chronic systolic (congestive) heart failure (HCC)     Wt Readings from Last 3 Encounters:   05/31/22 78 9 kg (174 lb)   02/24/22 77 1 kg (170 lb)   12/06/21 79 4 kg (175 lb)       Stable,              Relevant Medications    metoprolol succinate (TOPROL-XL) 25 mg 24 hr tablet    Other Relevant Orders    Comprehensive metabolic panel    Lipid Panel with Direct LDL reflex    Coronary artery disease of native heart with stable angina pectoris, unspecified vessel or lesion type ([address] )     Continue nitrodur           Relevant Medications    metoprolol succinate (TOPROL-XL) 25 mg 24 hr tablet    Other Relevant Orders    Lipid Panel with Direct LDL reflex       Genitourinary    Chronic kidney disease, stage 4 (severe) (HCC)     Lab Results   Component Value Date    EGFR 33 10/29/2019    EGFR 32 11/08/2017    CREATININE 1 98 (H) 10/29/2019    CREATININE 2 03 (H) 11/08/2017    CREATININE 1 83 (H) 04/06/2016   due for blood work           Relevant Orders    Comprehensive metabolic panel       Other    Anxiety     Requesting renewal of lorazepam  Pt signed new medication agreement              Relevant Medications    LORazepam (ATIVAN) 0 5 mg tablet    Other Relevant Orders    CBC and differential    Comprehensive metabolic panel    TSH, 3rd generation with Free T4 reflex    Chronic pain     Pt signed new medication agreement           Relevant Orders    [facility] All Prescribed Meds    Amphetamines, Methamphetamines    Butalbital    Phenobarbital    Secobarbital    Temazepam    Alprazolam    Clonazepam    Diazepam    Lorazepam    Oxazepam    Gabapentin    Pregabalin    Cocaine    Heroin    Buprenorphine    Levorphanol    Meperidine    Naltrexone    Fentanyl    Methadone    Oxycodone    Oxymorphone    Tapentadol    Tramadol    Codeine, Hydrocodone, Hydropmorphone, Morphine    Bath Salts    Kratom    Spice    Methylphenidate    Phentermine    Validity Oxidant    Validity Creatinine    Validity pH    Validity Specific    Comprehensive metabolic panel    TSH, 3rd generation with Free T4 reflex    Cigarette nicotine dependence without complication     No interest in quitting           Uncomplicated opioid dependence ([facility] )     New medication agreement signed today             Other Visit Diagnoses     Continuous opioid dependence ([facility] [address] )    -  Primary    Relevant Orders    [facility] All Prescribed Meds    Amphetamines, Methamphetamines    Butalbital    Phenobarbital    Secobarbital    Temazepam    Alprazolam    Clonazepam    Diazepam    Lorazepam    Oxazepam    Gabapentin    Pregabalin    Cocaine    Heroin    Buprenorphine    Levorphanol    Meperidine    Naltrexone    Fentanyl    Methadone    Oxycodone    Oxymorphone    Tapentadol    Tramadol    Codeine, Hydrocodone, Hydropmorphone, Morphine    Bath Salts    Kratom    Spice    Methylphenidate    Phentermine    Validity Oxidant    Validity Creatinine    Validity pH    Validity Specific    CBC and differential    Comprehensive metabolic panel    TSH, 3rd generation with Free T4 reflex    Ventricular tachycardia (HCC)        Relevant Medications    metoprolol succinate (TOPROL-XL) 25 mg 24 hr tablet    Other Relevant Orders    Lipid Panel with Direct LDL reflex         Treatment Plan: Decrease pain in right foot and low back    Treatment Goals: Improve mobility     Opiate risks  There are risks associated with opioid medications, including dependence, addiction and tolerance  The patient understands and agrees to use these medications only as prescribed  Potential side effects of the medications include, but are not limited to, constipation, drowsiness, addiction, impaired judgment and risk of fatal overdose if not taken as prescribed  The patient was warned against driving while taking sedation medications  Sharing medications is a felony  At this point in time, the patient is showing no signs of addiction, abuse, diversion or suicidal ideation  Pateint is taking concurrent benzodiazepines   Has been counseled on the risks of taking opioids and benzodiazepines including sedation, increased fall risk, dizziness, addictive potential and death  Patient has a high risk condition (age > 72, [place], renal or hepatic impairment, mental health condition, use of alcohol or other substances)  Has been counseled on the specific risks of taking opioids with these conditions and the increased risks including including sedation, increased fall risk, dizziness, addictive potential and death  Opioid agreement  Pain management agreement was reviewed with patient and signed/updated during visit      Drug screen  Drug screen collected during today's visit      PDMP review  [place] or [place]  reviewed  No red flags were identified; safe to proceed with prescription      I have spent 20 minutes directly with the patient  Greater than 50% of this time was spent in counseling/coordination of care regarding: prognosis, risks and benefits of treatment options, instructions for management and importance of treatment compliance  Depression Screening and Follow-up Plan: Patient was screened for depression during today's encounter  They screened negative with a PHQ-2 score of 0  Subjective     Opioid Management:   Type of visit: Follow-up    Interval history: Pt has been on pain medication for chronic foot pain  Aberrant behavior?: No      Adverse effects from medication?: No      Screening Tools/Assessments:    PHQ-2/9:  PHQ-2 score: 0      Drug Screen:  Date of last drug screen: 5/31/2022    Opioid agreement:  Active Opioid agreement on file?: No    Opioid agreement signed date: 4/9/2021  Opioid agreement expiration date: 4/9/2022    Naloxone:  Currently prescribed Naloxone (Narcan): Yes      Pt is here for new medication agreement  ocmplains of chronic pain in his right pinky toe  His left pinky toe is also painful but not as bad as the right       Pain Medications             carisoprodol (SOMA) 350 mg tablet Take 1 tablet (350 mg total) by mouth in the morning and 1 tablet (350 mg total) at noon and 1 tablet (350 mg total) in the evening and 1 tablet (350 mg total) before bedtime  oxyCODONE (ROXICODONE) 30 MG immediate release tablet Take 1 tablet (30 mg total) by mouth every 4 (four) hours as needed for moderate pain Max Daily Amount: 180 mg         Outpatient Morphine Milligram Equivalents Per Day     5/31/22 and after 270 MME/Day    Order Name Dose Route Frequency Maximum MME/Day     oxyCODONE (ROXICODONE) 30 MG immediate release tablet 30 mg Oral Every 4 hours  MME/Day    Total Potential Morphine Milligram Equivalents Per Day 270 MME/Day    Calculation Information        oxyCODONE (ROXICODONE) 30 MG immediate release tablet    oxyCODONE 30 MG Tabs: maximum daily dose of 180 mg * morphine equivalence factor of 1 5 = 270 MME/Day                         PDMP Review       Value Time User    PDMP Reviewed  Yes 5/31/2022  2:47 PM Osiel Bump, DO         Review of Systems   Constitutional: Positive for fatigue  Negative for fever  HENT: Negative  Eyes: Negative  Respiratory: Negative  Negative for cough  Cardiovascular: Negative  Gastrointestinal: Negative  Endocrine: Negative  Genitourinary: Negative  Musculoskeletal: Positive for back pain, gait problem and myalgias  Skin: Negative  Allergic/Immunologic: Negative  Psychiatric/Behavioral: Negative  Objective     /70   Pulse 56   Temp (!) 97 3 °F (36 3 °C)   Ht 5' 11 5" (1 816 m)   Wt 78 9 kg (174 lb)   SpO2 97%   BMI 23 93 kg/m²     Physical Exam  Vitals and nursing note reviewed  Constitutional:       Appearance: He is well-developed  HENT:      Head: Normocephalic  Eyes:      Conjunctiva/sclera: Conjunctivae normal       Pupils: Pupils are equal, round, and reactive to light  Cardiovascular:      Rate and Rhythm: Normal rate and regular rhythm  Pulses: Pulses are weak  Dorsalis pedis pulses are 1+ on the right side and 1+ on the left side  Posterior tibial pulses are 1+ on the right side and 1+ on the left side  Pulmonary:      Effort: Pulmonary effort is normal       Breath sounds: Wheezing present  Abdominal:      General: Bowel sounds are normal       Palpations: Abdomen is soft  Musculoskeletal:         General: Normal range of motion  Cervical back: Normal range of motion and neck supple  Feet:    Feet:      Right foot:      Skin integrity: No ulcer, skin breakdown, erythema, warmth, callus or dry skin  Left foot:      Skin integrity: No ulcer, skin breakdown, erythema, warmth, callus or dry skin  Skin:     General: Skin is warm and dry  Neurological:      Mental Status: He is alert and oriented to person, place, and time  Psychiatric:         Behavior: Behavior normal          Thought Content:  Thought content normal          Judgment: Judgment normal          Apryl An, DO

## 2022-06-01 NOTE — ASSESSMENT & PLAN NOTE
Wt Readings from Last 3 Encounters:   05/31/22 78 9 kg (174 lb)   02/24/22 77 1 kg (170 lb)   12/06/21 79 4 kg (175 lb)       Stable,

## 2022-06-01 NOTE — ASSESSMENT & PLAN NOTE
Lab Results   Component Value Date    EGFR 33 10/29/2019    EGFR 32 11/08/2017    CREATININE 1 98 (H) 10/29/2019    CREATININE 2 03 (H) 11/08/2017    CREATININE 1 83 (H) 04/06/2016   due for blood work

## 2022-06-03 LAB
6MAM UR QL CFM: NEGATIVE NG/ML
7AMINOCLONAZEPAM UR QL CFM: NEGATIVE NG/ML
A-OH ALPRAZ UR QL CFM: NEGATIVE NG/ML
ACCEPTABLE CREAT UR QL: NORMAL MG/DL
ACCEPTIBLE SP GR UR QL: NORMAL
AMPHET UR QL CFM: NEGATIVE NG/ML
BUPRENORPHINE UR QL CFM: ABNORMAL NG/ML
BUTALBITAL UR QL CFM: NEGATIVE NG/ML
BZE UR QL CFM: NEGATIVE NG/ML
CODEINE UR QL CFM: NEGATIVE NG/ML
EDDP UR QL CFM: NEGATIVE NG/ML
EUTYLONE UR QL: NEGATIVE NG/ML
FENTANYL UR QL CFM: NEGATIVE NG/ML
GLIADIN IGG SER IA-ACNC: NEGATIVE NG/ML
HYDROCODONE UR QL CFM: NEGATIVE NG/ML
HYDROMORPHONE UR QL CFM: NEGATIVE NG/ML
LORAZEPAM UR QL CFM: ABNORMAL NG/ML
ME-PHENIDATE UR QL CFM: NEGATIVE NG/ML
MEPERIDINE UR QL CFM: NEGATIVE NG/ML
METHADONE UR QL CFM: NEGATIVE NG/ML
METHAMPHET UR QL CFM: NEGATIVE NG/ML
MORPHINE UR QL CFM: NEGATIVE NG/ML
NALTREXONE UR QL CFM: NEGATIVE NG/ML
NITRITE UR QL: NORMAL UG/ML
NORBUPRENORPHINE UR QL CFM: ABNORMAL NG/ML
NORDIAZEPAM UR QL CFM: NEGATIVE NG/ML
NORFENTANYL UR QL CFM: NEGATIVE NG/ML
NORHYDROCODONE UR QL CFM: NEGATIVE NG/ML
NORMEPERIDINE UR QL CFM: NEGATIVE NG/ML
NOROXYCODONE UR QL CFM: NORMAL NG/ML
OXAZEPAM UR QL CFM: NEGATIVE NG/ML
OXYCODONE UR QL CFM: NORMAL NG/ML
OXYMORPHONE UR QL CFM: NORMAL NG/ML
OXYMORPHONE UR QL CFM: NORMAL NG/ML
PHENOBARB UR QL CFM: NEGATIVE NG/ML
RESULT ALL_PRESCRIBED MEDS AND SPECIAL INSTRUCTIONS: NORMAL
SECOBARBITAL UR QL CFM: NEGATIVE NG/ML
SL AMB 3-METHYL-FENTANYL QUANTIFICATION: NORMAL NG/ML
SL AMB 4-ANPP QUANTIFICATION: NORMAL NG/ML
SL AMB 4-FIBF QUANTIFICATION: NORMAL NG/ML
SL AMB 5F-ADB-M7 METABOLITE QUANTIFICATION: NEGATIVE NG/ML
SL AMB 7-OH-MITRAGYNINE (KRATOM ALKALOID) QUANTIFICATION: NEGATIVE NG/ML
SL AMB AB-FUBINACA-M3 METABOLITE QUANTIFICATION: NEGATIVE NG/ML
SL AMB ACETYL FENTANYL QUANTIFICATION: NORMAL NG/ML
SL AMB ACETYL NORFENTANYL QUANTIFICATION: NORMAL NG/ML
SL AMB ACRYL FENTANYL QUANTIFICATION: NORMAL NG/ML
SL AMB BUTRYL FENTANYL QUANTIFICATION: NORMAL NG/ML
SL AMB CARFENTANIL QUANTIFICATION: NORMAL NG/ML
SL AMB CYCLOPROPYL FENTANYL QUANTIFICATION: NORMAL NG/ML
SL AMB DEXTRORPHAN (DEXTROMETHORPHAN METABOLITE) QUANT: NEGATIVE NG/ML
SL AMB FURANYL FENTANYL QUANTIFICATION: NORMAL NG/ML
SL AMB GABAPENTIN QUANTIFICATION: NEGATIVE
SL AMB JWH018 METABOLITE QUANTIFICATION: NEGATIVE NG/ML
SL AMB JWH073 METABOLITE QUANTIFICATION: NEGATIVE NG/ML
SL AMB MDMB-FUBINACA-M1 METABOLITE QUANTIFICATION: NEGATIVE NG/ML
SL AMB METHOXYACETYL FENTANYL QUANTIFICATION: NORMAL NG/ML
SL AMB METHYLONE QUANTIFICATION: NEGATIVE NG/ML
SL AMB N-DESMETHYL U-47700 QUANTIFICATION: NORMAL NG/ML
SL AMB N-DESMETHYL-TRAMADOL QUANTIFICATION: NEGATIVE NG/ML
SL AMB PHENTERMINE QUANTIFICATION: NEGATIVE NG/ML
SL AMB PREGABALIN QUANTIFICATION: NEGATIVE
SL AMB RCS4 METABOLITE QUANTIFICATION: NEGATIVE NG/ML
SL AMB RITALINIC ACID QUANTIFICATION: NEGATIVE NG/ML
SL AMB U-47700 QUANTIFICATION: NORMAL NG/ML
SMOOTH MUSCLE AB TITR SER IF: NEGATIVE NG/ML
SPECIMEN DRAWN SERPL: NEGATIVE NG/ML
SPECIMEN PH ACCEPTABLE UR: NORMAL
TAPENTADOL UR QL CFM: NEGATIVE NG/ML
TEMAZEPAM UR QL CFM: NEGATIVE NG/ML
TEMAZEPAM UR QL CFM: NEGATIVE NG/ML
TRAMADOL UR QL CFM: NEGATIVE NG/ML
URATE/CREAT 24H UR: NEGATIVE NG/ML

## 2022-06-09 DIAGNOSIS — I25.10 CORONARY ATHEROSCLEROSIS DUE TO CALCIFIED CORONARY LESION: ICD-10-CM

## 2022-06-09 DIAGNOSIS — I73.9 PERIPHERAL VASCULAR DISEASE (HCC): ICD-10-CM

## 2022-06-09 DIAGNOSIS — I63.9 RIGHT HEMISPHERE, CEREBRAL INFARCTION (HCC): ICD-10-CM

## 2022-06-09 DIAGNOSIS — I25.84 CORONARY ATHEROSCLEROSIS DUE TO CALCIFIED CORONARY LESION: ICD-10-CM

## 2022-06-09 RX ORDER — CLOPIDOGREL BISULFATE 75 MG/1
75 TABLET ORAL DAILY
Qty: 90 TABLET | Refills: 1 | Status: SHIPPED | OUTPATIENT
Start: 2022-06-09

## 2022-06-20 DIAGNOSIS — G89.4 CHRONIC PAIN SYNDROME: ICD-10-CM

## 2022-06-20 RX ORDER — OXYCODONE HYDROCHLORIDE 30 MG/1
30 TABLET ORAL EVERY 4 HOURS PRN
Qty: 120 TABLET | Refills: 0 | Status: SHIPPED | OUTPATIENT
Start: 2022-06-20 | End: 2022-07-11 | Stop reason: SDUPTHER

## 2022-06-27 DIAGNOSIS — M62.838 MUSCLE SPASM: ICD-10-CM

## 2022-06-27 DIAGNOSIS — F41.9 ANXIETY: ICD-10-CM

## 2022-06-27 RX ORDER — CARISOPRODOL 350 MG/1
350 TABLET ORAL 4 TIMES DAILY
Qty: 120 TABLET | Refills: 0 | Status: SHIPPED | OUTPATIENT
Start: 2022-06-27 | End: 2022-07-21 | Stop reason: SDUPTHER

## 2022-06-27 RX ORDER — LORAZEPAM 0.5 MG/1
0.5 TABLET ORAL EVERY 8 HOURS PRN
Qty: 90 TABLET | Refills: 0 | Status: SHIPPED | OUTPATIENT
Start: 2022-06-27 | End: 2022-07-22 | Stop reason: SDUPTHER

## 2022-07-11 DIAGNOSIS — G89.4 CHRONIC PAIN SYNDROME: ICD-10-CM

## 2022-07-11 RX ORDER — OXYCODONE HYDROCHLORIDE 30 MG/1
30 TABLET ORAL EVERY 4 HOURS PRN
Qty: 120 TABLET | Refills: 0 | Status: SHIPPED | OUTPATIENT
Start: 2022-07-11 | End: 2022-08-08 | Stop reason: SDUPTHER

## 2022-07-21 DIAGNOSIS — M62.838 MUSCLE SPASM: ICD-10-CM

## 2022-07-21 RX ORDER — CARISOPRODOL 350 MG/1
350 TABLET ORAL 4 TIMES DAILY
Qty: 120 TABLET | Refills: 0 | Status: SHIPPED | OUTPATIENT
Start: 2022-07-21 | End: 2022-08-18

## 2022-07-22 DIAGNOSIS — F41.9 ANXIETY: ICD-10-CM

## 2022-07-22 DIAGNOSIS — M62.838 MUSCLE SPASM: ICD-10-CM

## 2022-07-22 DIAGNOSIS — J44.9 CHRONIC OBSTRUCTIVE PULMONARY DISEASE, UNSPECIFIED COPD TYPE (HCC): ICD-10-CM

## 2022-07-23 NOTE — TELEPHONE ENCOUNTER
Medication failed [facility] protocol  Please forward to your office staff for further review as this medication was reviewed by a [facility] RN

## 2022-07-24 RX ORDER — LORAZEPAM 0.5 MG/1
0.5 TABLET ORAL EVERY 8 HOURS PRN
Qty: 90 TABLET | Refills: 0 | Status: SHIPPED | OUTPATIENT
Start: 2022-07-24 | End: 2022-08-05 | Stop reason: SDUPTHER

## 2022-08-05 DIAGNOSIS — F41.9 ANXIETY: ICD-10-CM

## 2022-08-06 RX ORDER — LORAZEPAM 0.5 MG/1
0.5 TABLET ORAL EVERY 8 HOURS PRN
Qty: 90 TABLET | Refills: 0 | Status: SHIPPED | OUTPATIENT
Start: 2022-08-06 | End: 2022-09-06 | Stop reason: SDUPTHER

## 2022-08-08 DIAGNOSIS — G89.4 CHRONIC PAIN SYNDROME: ICD-10-CM

## 2022-08-08 RX ORDER — OXYCODONE HYDROCHLORIDE 30 MG/1
30 TABLET ORAL EVERY 4 HOURS PRN
Qty: 120 TABLET | Refills: 0 | Status: SHIPPED | OUTPATIENT
Start: 2022-08-08 | End: 2022-09-06 | Stop reason: SDUPTHER

## 2022-09-06 DIAGNOSIS — E78.49 OTHER HYPERLIPIDEMIA: ICD-10-CM

## 2022-09-06 DIAGNOSIS — F41.9 ANXIETY: ICD-10-CM

## 2022-09-06 DIAGNOSIS — J44.9 CHRONIC OBSTRUCTIVE PULMONARY DISEASE, UNSPECIFIED COPD TYPE (HCC): ICD-10-CM

## 2022-09-06 DIAGNOSIS — G89.4 CHRONIC PAIN SYNDROME: ICD-10-CM

## 2022-09-06 RX ORDER — LORAZEPAM 0.5 MG/1
0.5 TABLET ORAL EVERY 8 HOURS PRN
Qty: 90 TABLET | Refills: 0 | Status: SHIPPED | OUTPATIENT
Start: 2022-09-06 | End: 2022-10-20 | Stop reason: SDUPTHER

## 2022-09-06 RX ORDER — OXYCODONE HYDROCHLORIDE 30 MG/1
30 TABLET ORAL EVERY 4 HOURS PRN
Qty: 120 TABLET | Refills: 0 | Status: SHIPPED | OUTPATIENT
Start: 2022-09-06 | End: 2022-10-04 | Stop reason: SDUPTHER

## 2022-09-06 RX ORDER — IPRATROPIUM BROMIDE AND ALBUTEROL SULFATE 2.5; .5 MG/3ML; MG/3ML
3 SOLUTION RESPIRATORY (INHALATION) EVERY 6 HOURS PRN
Qty: 120 ML | Refills: 2 | Status: SHIPPED | OUTPATIENT
Start: 2022-09-06

## 2022-09-06 RX ORDER — ATORVASTATIN CALCIUM 80 MG/1
80 TABLET, FILM COATED ORAL DAILY
Qty: 90 TABLET | Refills: 3 | Status: SHIPPED | OUTPATIENT
Start: 2022-09-06

## 2022-09-06 RX ORDER — BUDESONIDE AND FORMOTEROL FUMARATE DIHYDRATE 160; 4.5 UG/1; UG/1
2 AEROSOL RESPIRATORY (INHALATION) 2 TIMES DAILY
Qty: 30.6 G | Refills: 3 | Status: SHIPPED | OUTPATIENT
Start: 2022-09-06

## 2022-09-28 DIAGNOSIS — G89.4 CHRONIC PAIN SYNDROME: ICD-10-CM

## 2022-09-28 RX ORDER — OXYCODONE HYDROCHLORIDE 30 MG/1
30 TABLET ORAL EVERY 4 HOURS PRN
Qty: 120 TABLET | Refills: 0 | Status: CANCELLED | OUTPATIENT
Start: 2022-09-28

## 2022-10-04 ENCOUNTER — OFFICE VISIT (OUTPATIENT)
Dept: FAMILY MEDICINE CLINIC | Facility: CLINIC | Age: 77
End: 2022-10-04
Payer: COMMERCIAL

## 2022-10-04 VITALS
HEIGHT: 72 IN | DIASTOLIC BLOOD PRESSURE: 68 MMHG | SYSTOLIC BLOOD PRESSURE: 128 MMHG | OXYGEN SATURATION: 98 % | BODY MASS INDEX: 24.11 KG/M2 | WEIGHT: 178 LBS | HEART RATE: 68 BPM | TEMPERATURE: 97.1 F

## 2022-10-04 DIAGNOSIS — F11.20 UNCOMPLICATED OPIOID DEPENDENCE (HCC): ICD-10-CM
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
## 2022-10-04 DIAGNOSIS — S51.811D SKIN TEAR OF FOREARM WITHOUT COMPLICATION, RIGHT, SUBSEQUENT ENCOUNTER: ICD-10-CM

## 2022-10-04 DIAGNOSIS — G89.4 CHRONIC PAIN SYNDROME: ICD-10-CM

## 2022-10-04 DIAGNOSIS — E11.42 TYPE 2 DIABETES MELLITUS WITH DIABETIC POLYNEUROPATHY, WITHOUT LONG-TERM CURRENT USE OF INSULIN (HCC): ICD-10-CM

## 2022-10-04 DIAGNOSIS — I10 ESSENTIAL HYPERTENSION: ICD-10-CM

## 2022-10-04 DIAGNOSIS — Z23 NEED FOR VACCINATION: ICD-10-CM

## 2022-10-04 DIAGNOSIS — T14.8XXA HEMATOMA: ICD-10-CM

## 2022-10-04 PROBLEM — L98.419: Status: RESOLVED | Noted: 2022-02-24 | Resolved: 2022-10-04

## 2022-10-04 PROBLEM — L03.317 CELLULITIS AND ABSCESS OF BUTTOCK: Status: RESOLVED | Noted: 2021-10-20 | Resolved: 2022-10-04

## 2022-10-04 PROBLEM — L02.31 CELLULITIS AND ABSCESS OF BUTTOCK: Status: RESOLVED | Noted: 2021-10-20 | Resolved: 2022-10-04

## 2022-10-04 LAB — SL AMB POCT HEMOGLOBIN AIC: 5.7 (ref ?–6.5)

## 2022-10-04 PROCEDURE — G0008 ADMIN INFLUENZA VIRUS VAC: HCPCS

## 2022-10-04 PROCEDURE — 83036 HEMOGLOBIN GLYCOSYLATED A1C: CPT | Performed by: FAMILY MEDICINE

## 2022-10-04 PROCEDURE — 99214 OFFICE O/P EST MOD 30 MIN: CPT | Performed by: FAMILY MEDICINE

## 2022-10-04 PROCEDURE — 90662 IIV NO PRSV INCREASED AG IM: CPT

## 2022-10-04 RX ORDER — OXYCODONE HYDROCHLORIDE 30 MG/1
30 TABLET ORAL EVERY 4 HOURS PRN
Qty: 120 TABLET | Refills: 0 | Status: SHIPPED | OUTPATIENT
Start: 2022-10-04

## 2022-10-05 DIAGNOSIS — J44.9 CHRONIC OBSTRUCTIVE PULMONARY DISEASE, UNSPECIFIED COPD TYPE (HCC): ICD-10-CM

## 2022-10-05 NOTE — PROGRESS NOTES
Assessment/Plan:      1  Injury due to fall, subsequent encounter    2  Skin tear of forearm without complication, right, subsequent encounter  Assessment & Plan:  Observe area for signs of infection      3  Hematoma    4  Type 2 diabetes mellitus with diabetic polyneuropathy, without long-term current use of insulin ([facility])  Assessment & Plan:    Lab Results   Component Value Date    HGBA1C 5 7 10/04/2022   controlled, diet controlled    Orders:  -     POCT hemoglobin A1c    5  Need for vaccination  -     influenza vaccine, high-dose, PF 0 7 mL (FLUZONE HIGH-DOSE)    6  Chronic pain syndrome  Comments:  uncontrolled by long acting medication, insurance does not cover patch, pt will restart original medication, and get medical marijuana card- cream, drops  Orders:  -     oxyCODONE (ROXICODONE) 30 MG immediate release tablet; Take 1 tablet (30 mg total) by mouth every 4 (four) hours as needed for moderate pain Max Daily Amount: 180 mg    7  Essential hypertension  Assessment & Plan:  Controlled, continue current medication      8  Uncomplicated opioid dependence ([facility] [address] )  Assessment & Plan:  Medication agreement up to date, pdmp reviewed regularly, urine drug screen up to date  Subjective:  Chief Complaint   Patient presents with    Follow-up     3 month opioid f/u, fall last Sunday hurt his lower back, right wrist,   Diabetes check,         Patient ID: Trae Hayden  is a 69 y o  male  Pt is seen in follow up to recent visit to [place] [facility]  Pt had a fall when getting up at night to use the bathroom  Has a hematoma on his left buttock that is improving  Still with pain over area  Cannot sleep on left side, only right  Having trouble sleeping  He has a skin tear on his right arm  He denies any head trauma with the fall  He denies a headache  He is requesting his medication early due to increased pain  No constipation  Review of Systems   Constitutional: Positive for fatigue   Negative for fever    HENT: Negative  Eyes: Negative  Respiratory: Negative  Negative for cough  Cardiovascular: Negative  Gastrointestinal: Negative  Endocrine: Negative  Genitourinary: Negative  Musculoskeletal: Negative  Skin: Positive for wound  Bruise left buttock,  Skin tear right arm   Allergic/Immunologic: Negative  Neurological: Negative  Psychiatric/Behavioral: Positive for sleep disturbance  The following portions of the patient's history were reviewed and updated as appropriate: allergies, current medications, past family history, past medical history, past social history, past surgical history and problem list     Objective:  Vitals:    10/04/22 1503   BP: 128/68   Pulse: 68   Temp: (!) 97 1 °F (36 2 °C)   SpO2: 98%   Weight: 80 7 kg (178 lb)   Height: 5' 11 5" (1 816 m)      Physical Exam  Vitals and nursing note reviewed  Constitutional:       Appearance: He is well-developed  HENT:      Head: Normocephalic and atraumatic  Cardiovascular:      Rate and Rhythm: Normal rate and regular rhythm  Heart sounds: Normal heart sounds  Pulmonary:      Effort: Pulmonary effort is normal       Breath sounds: Normal breath sounds  Abdominal:      General: Bowel sounds are normal       Palpations: Abdomen is soft  Musculoskeletal:         General: Swelling and tenderness present  Skin:     General: Skin is warm and dry  Findings: Bruising and erythema present  Comments: Left buttock, large hematoma  Right arm large skin tear over forearm without signs of infection, no drainage or redness   Neurological:      Mental Status: He is alert and oriented to person, place, and time  Psychiatric:         Behavior: Behavior normal          Thought Content:  Thought content normal          Judgment: Judgment normal

## 2022-10-14 DIAGNOSIS — M62.838 MUSCLE SPASM: ICD-10-CM

## 2022-10-14 RX ORDER — CARISOPRODOL 350 MG/1
350 TABLET ORAL 4 TIMES DAILY
Qty: 120 TABLET | Refills: 0 | Status: SHIPPED | OUTPATIENT
Start: 2022-10-14

## 2022-10-16 DIAGNOSIS — J44.1 CHRONIC OBSTRUCTIVE PULMONARY DISEASE WITH ACUTE EXACERBATION (HCC): Primary | ICD-10-CM

## 2022-10-16 RX ORDER — ALBUTEROL SULFATE 90 UG/1
2 AEROSOL, METERED RESPIRATORY (INHALATION) EVERY 6 HOURS PRN
Qty: 6.7 G | Refills: 5 | Status: SHIPPED | OUTPATIENT
Start: 2022-10-16

## 2022-10-20 DIAGNOSIS — I50.22 CHRONIC SYSTOLIC (CONGESTIVE) HEART FAILURE (HCC): Primary | ICD-10-CM

## 2022-10-20 DIAGNOSIS — F41.9 ANXIETY: ICD-10-CM

## 2022-10-20 RX ORDER — FUROSEMIDE 80 MG
80 TABLET ORAL DAILY
Qty: 90 TABLET | Refills: 1 | Status: SHIPPED | OUTPATIENT
Start: 2022-10-20

## 2022-10-20 RX ORDER — FUROSEMIDE 80 MG
TABLET ORAL
Status: CANCELLED | OUTPATIENT
Start: 2022-10-20

## 2022-10-20 RX ORDER — LORAZEPAM 0.5 MG/1
0.5 TABLET ORAL EVERY 8 HOURS PRN
Qty: 90 TABLET | Refills: 0 | Status: CANCELLED | OUTPATIENT
Start: 2022-10-20

## 2022-10-20 RX ORDER — LORAZEPAM 0.5 MG/1
0.5 TABLET ORAL EVERY 8 HOURS PRN
Qty: 90 TABLET | Refills: 0 | Status: SHIPPED | OUTPATIENT
Start: 2022-10-20

## 2022-10-21 ENCOUNTER — TELEPHONE (OUTPATIENT)
Dept: FAMILY MEDICINE CLINIC | Facility: CLINIC | Age: 77
End: 2022-10-21

## 2022-10-21 NOTE — TELEPHONE ENCOUNTER
Pt came in office to see if you can write a letter to his electric company so they wont shut his electric off

## 2022-10-25 ENCOUNTER — TELEPHONE (OUTPATIENT)
Dept: FAMILY MEDICINE CLINIC | Facility: CLINIC | Age: 77
End: 2022-10-25

## 2022-10-25 NOTE — TELEPHONE ENCOUNTER
Called power co   They said it was paid and taken care of    They did say they will send a letter of medical necessity

## 2022-10-25 NOTE — TELEPHONE ENCOUNTER
Pt has a 3 day shut off notice  A letter of medical emergency notice must be written ASAP  Elner Clos Form scanned into the chart  And put to the MA in a red folder

## 2022-10-26 DIAGNOSIS — G89.4 CHRONIC PAIN SYNDROME: ICD-10-CM

## 2022-10-26 RX ORDER — OXYCODONE HYDROCHLORIDE 30 MG/1
30 TABLET ORAL EVERY 4 HOURS PRN
Qty: 120 TABLET | Refills: 0 | OUTPATIENT
Start: 2022-10-26

## 2022-11-01 DIAGNOSIS — G89.4 CHRONIC PAIN SYNDROME: ICD-10-CM

## 2022-11-01 RX ORDER — OXYCODONE HYDROCHLORIDE 30 MG/1
30 TABLET ORAL EVERY 4 HOURS PRN
Qty: 120 TABLET | Refills: 0 | Status: SHIPPED | OUTPATIENT
Start: 2022-11-01

## 2022-11-01 NOTE — TELEPHONE ENCOUNTER
Galina De Luna the patient's daughter called in wanting to know if Dorotakatherine Baker can write a hand script for this patient's oxycodone because his pharmacy won't have this medication in stock for 3 days  Kimberli's best call back is going to be, [phone] if it can be written or not

## 2022-11-01 NOTE — TELEPHONE ENCOUNTER
Please call [facility]    Prescriptions for controlled substances can no longer be written  They all have to be sent electronically

## 2022-11-07 DIAGNOSIS — F41.9 ANXIETY: ICD-10-CM

## 2022-11-07 RX ORDER — LORAZEPAM 0.5 MG/1
0.5 TABLET ORAL EVERY 8 HOURS PRN
Qty: 90 TABLET | Refills: 0 | Status: CANCELLED | OUTPATIENT
Start: 2022-11-07

## 2022-11-14 DIAGNOSIS — F41.9 ANXIETY: ICD-10-CM

## 2022-11-14 RX ORDER — LORAZEPAM 0.5 MG/1
0.5 TABLET ORAL EVERY 8 HOURS PRN
Qty: 90 TABLET | Refills: 0 | Status: SHIPPED | OUTPATIENT
Start: 2022-11-14 | End: 2022-11-18

## 2022-11-15 ENCOUNTER — TELEPHONE (OUTPATIENT)
Dept: FAMILY MEDICINE CLINIC | Facility: CLINIC | Age: 77
End: 2022-11-15

## 2022-11-15 NOTE — TELEPHONE ENCOUNTER
[address] wants documentation on the patient's chart that he will be getting his medication through them as of 11/15/2022  [facility] [phone]

## 2022-11-16 DIAGNOSIS — K59.09 CHRONIC CONSTIPATION: ICD-10-CM

## 2022-11-16 DIAGNOSIS — I50.22 CHRONIC SYSTOLIC (CONGESTIVE) HEART FAILURE (HCC): ICD-10-CM

## 2022-11-16 DIAGNOSIS — M62.838 MUSCLE SPASM: ICD-10-CM

## 2022-11-16 DIAGNOSIS — J44.9 CHRONIC OBSTRUCTIVE PULMONARY DISEASE, UNSPECIFIED COPD TYPE (HCC): ICD-10-CM

## 2022-11-16 DIAGNOSIS — F17.218 CIGARETTE NICOTINE DEPENDENCE WITH OTHER NICOTINE-INDUCED DISORDER: ICD-10-CM

## 2022-11-16 DIAGNOSIS — E11.9 TYPE 2 DIABETES MELLITUS WITHOUT COMPLICATION, WITHOUT LONG-TERM CURRENT USE OF INSULIN (HCC): Primary | ICD-10-CM

## 2022-11-16 DIAGNOSIS — N40.1 BENIGN PROSTATIC HYPERPLASIA WITH URINARY FREQUENCY: ICD-10-CM

## 2022-11-16 DIAGNOSIS — R35.0 BENIGN PROSTATIC HYPERPLASIA WITH URINARY FREQUENCY: ICD-10-CM

## 2022-11-16 DIAGNOSIS — F41.9 ANXIETY: ICD-10-CM

## 2022-11-17 RX ORDER — TAMSULOSIN HYDROCHLORIDE 0.4 MG/1
CAPSULE ORAL
Qty: 90 CAPSULE | Refills: 3 | Status: SHIPPED | OUTPATIENT
Start: 2022-11-17

## 2022-11-17 RX ORDER — BUDESONIDE AND FORMOTEROL FUMARATE DIHYDRATE 160; 4.5 UG/1; UG/1
AEROSOL RESPIRATORY (INHALATION)
Qty: 30.6 G | Refills: 3 | Status: SHIPPED | OUTPATIENT
Start: 2022-11-17

## 2022-11-18 RX ORDER — CARISOPRODOL 350 MG/1
TABLET ORAL
Qty: 360 TABLET | Refills: 3 | Status: SHIPPED | OUTPATIENT
Start: 2022-11-18 | End: 2022-11-23 | Stop reason: SDUPTHER

## 2022-11-18 RX ORDER — NITROGLYCERIN 0.4 MG/1
TABLET SUBLINGUAL
Qty: 75 TABLET | Refills: 3 | Status: SHIPPED | OUTPATIENT
Start: 2022-11-18

## 2022-11-18 RX ORDER — LORAZEPAM 0.5 MG/1
TABLET ORAL
Qty: 270 TABLET | Refills: 3 | Status: SHIPPED | OUTPATIENT
Start: 2022-11-18

## 2022-11-18 RX ORDER — ASPIRIN 81 MG/1
TABLET, COATED ORAL
Qty: 90 TABLET | Refills: 3 | Status: SHIPPED | OUTPATIENT
Start: 2022-11-18

## 2022-11-18 RX ORDER — DAPAGLIFLOZIN 10 MG/1
TABLET, FILM COATED ORAL
Qty: 90 TABLET | Refills: 3 | Status: SHIPPED | OUTPATIENT
Start: 2022-11-18

## 2022-11-18 RX ORDER — LANOLIN ALCOHOL/MO/W.PET/CERES
CREAM (GRAM) TOPICAL
Qty: 90 TABLET | Refills: 3 | Status: SHIPPED | OUTPATIENT
Start: 2022-11-18

## 2022-11-18 RX ORDER — FUROSEMIDE 80 MG
TABLET ORAL
Qty: 90 TABLET | Refills: 3 | Status: SHIPPED | OUTPATIENT
Start: 2022-11-18

## 2022-11-18 RX ORDER — POLYETHYLENE GLYCOL 3350 17 G/17G
POWDER, FOR SOLUTION ORAL
Qty: 850 G | Refills: 3 | Status: SHIPPED | OUTPATIENT
Start: 2022-11-18

## 2022-11-19 ENCOUNTER — TELEPHONE (OUTPATIENT)
Dept: FAMILY MEDICINE CLINIC | Facility: CLINIC | Age: 77
End: 2022-11-19

## 2022-11-19 NOTE — TELEPHONE ENCOUNTER
Patient's pharmacy is calling to request an urgent refill of the following medication:    Medication Refill Request     Name  carisoprodol (SOMA) 350 mg tablet    Dose/Frequency TAKE ONE TABLET BY MOUTH FOUR TIMES DAILY    Quantity 360    Paged to on call provider via TC

## 2022-11-23 DIAGNOSIS — M62.838 MUSCLE SPASM: ICD-10-CM

## 2022-11-23 DIAGNOSIS — G89.4 CHRONIC PAIN SYNDROME: ICD-10-CM

## 2022-11-23 RX ORDER — OXYCODONE HYDROCHLORIDE 30 MG/1
30 TABLET ORAL EVERY 4 HOURS PRN
Qty: 120 TABLET | Refills: 0 | OUTPATIENT
Start: 2022-11-23

## 2022-11-23 RX ORDER — CARISOPRODOL 350 MG/1
350 TABLET ORAL 4 TIMES DAILY PRN
Qty: 120 TABLET | Refills: 0 | Status: SHIPPED | OUTPATIENT
Start: 2022-11-23

## 2022-11-23 RX ORDER — CARISOPRODOL 350 MG/1
TABLET ORAL
Qty: 360 TABLET | Refills: 3 | Status: CANCELLED | OUTPATIENT
Start: 2022-11-23

## 2022-11-25 ENCOUNTER — RA CDI HCC (OUTPATIENT)
Dept: OTHER | Facility: HOSPITAL | Age: 77
End: 2022-11-25

## 2022-11-25 NOTE — PROGRESS NOTES
[facility]  coding opportunities          Chart Reviewed number of suggestions sent to Provider: 3  [id number]  [id number]  [id number]     Patients Insurance     Medicare Insurance: [facility]

## 2022-12-05 ENCOUNTER — TELEPHONE (OUTPATIENT)
Dept: FAMILY MEDICINE CLINIC | Facility: CLINIC | Age: 77
End: 2022-12-05

## 2022-12-05 NOTE — TELEPHONE ENCOUNTER
I contacted patient's wife regarding Kevin's medication and she states that he passed away yesterday, 12/05/22

## 2024-05-09 NOTE — TELEPHONE ENCOUNTER
Left message for patient, both Med check and diabetic  If he thinks he won't get his pain pills he will probably make an appointment  Yes